# Patient Record
Sex: MALE
[De-identification: names, ages, dates, MRNs, and addresses within clinical notes are randomized per-mention and may not be internally consistent; named-entity substitution may affect disease eponyms.]

---

## 2022-05-23 ENCOUNTER — APPOINTMENT (OUTPATIENT)
Dept: CARDIOLOGY | Facility: CLINIC | Age: 87
End: 2022-05-23
Payer: MEDICARE

## 2022-05-23 VITALS
HEART RATE: 76 BPM | DIASTOLIC BLOOD PRESSURE: 73 MMHG | WEIGHT: 121.5 LBS | HEIGHT: 65.75 IN | RESPIRATION RATE: 18 BRPM | SYSTOLIC BLOOD PRESSURE: 125 MMHG | BODY MASS INDEX: 19.76 KG/M2 | OXYGEN SATURATION: 95 % | TEMPERATURE: 98.2 F

## 2022-05-23 DIAGNOSIS — R00.2 PALPITATIONS: ICD-10-CM

## 2022-05-23 DIAGNOSIS — R94.31 ABNORMAL ELECTROCARDIOGRAM [ECG] [EKG]: ICD-10-CM

## 2022-05-23 PROBLEM — Z00.00 ENCOUNTER FOR PREVENTIVE HEALTH EXAMINATION: Status: ACTIVE | Noted: 2022-05-23

## 2022-05-23 PROCEDURE — 93306 TTE W/DOPPLER COMPLETE: CPT

## 2022-05-23 PROCEDURE — 99204 OFFICE O/P NEW MOD 45 MIN: CPT

## 2022-05-23 RX ORDER — POLYETHYLENE GLYCOL, PROPYLENE GLYCOL .4; .3 G/100ML; G/100ML
0.4-0.3 LIQUID OPHTHALMIC
Refills: 0 | Status: ACTIVE | COMMUNITY

## 2022-05-23 RX ORDER — LIDOCAINE 5% 700 MG/1
5 PATCH TOPICAL
Refills: 0 | Status: ACTIVE | COMMUNITY

## 2022-05-23 RX ORDER — CYCLOSPORINE 0.5 MG/ML
0.05 EMULSION OPHTHALMIC
Refills: 0 | Status: ACTIVE | COMMUNITY

## 2022-05-23 RX ORDER — PANCRELIPASE 120000; 24000; 76000 [USP'U]/1; [USP'U]/1; [USP'U]/1
24000-76000 CAPSULE, DELAYED RELEASE PELLETS ORAL
Refills: 0 | Status: ACTIVE | COMMUNITY

## 2022-05-23 RX ORDER — TOCOPHERSOLAN (VITAMIN E TPGS) 400/15ML
LIQUID (ML) ORAL
Refills: 0 | Status: ACTIVE | COMMUNITY

## 2022-05-23 RX ORDER — ICOSAPENT ETHYL 1000 MG/1
1 CAPSULE ORAL
Refills: 0 | Status: ACTIVE | COMMUNITY

## 2022-05-23 RX ORDER — UBIDECARENONE/VIT E ACET 100MG-5
1000 CAPSULE ORAL
Refills: 0 | Status: ACTIVE | COMMUNITY

## 2022-05-23 RX ORDER — SERTRALINE 25 MG/1
25 TABLET, FILM COATED ORAL
Refills: 0 | Status: ACTIVE | COMMUNITY

## 2022-05-23 NOTE — REASON FOR VISIT
[Arrhythmia/ECG Abnorrmalities] : arrhythmia/ECG abnormalities [FreeTextEntry1] : 86 M with CP and palpitations.

## 2022-05-23 NOTE — HISTORY OF PRESENT ILLNESS
[FreeTextEntry1] : He has a history of gastric and colon CA.\par \par The patient was found to have an abnormal EKG and referred for further evaluation.\par \par He has noted intermittent CP and palpitations over the last month.\par \par The patient reports that his symptoms occur several times/ week and are relieved with rest.\par \par No cough or fevers noted.\par \par His ET is stable.

## 2022-05-27 ENCOUNTER — NON-APPOINTMENT (OUTPATIENT)
Age: 87
End: 2022-05-27

## 2022-06-23 ENCOUNTER — APPOINTMENT (OUTPATIENT)
Dept: CARDIOLOGY | Facility: CLINIC | Age: 87
End: 2022-06-23
Payer: MEDICARE

## 2022-06-23 VITALS
DIASTOLIC BLOOD PRESSURE: 73 MMHG | OXYGEN SATURATION: 93 % | HEART RATE: 65 BPM | RESPIRATION RATE: 18 BRPM | SYSTOLIC BLOOD PRESSURE: 134 MMHG | WEIGHT: 116 LBS | BODY MASS INDEX: 18.87 KG/M2 | TEMPERATURE: 97.8 F

## 2022-06-23 DIAGNOSIS — I49.3 VENTRICULAR PREMATURE DEPOLARIZATION: ICD-10-CM

## 2022-06-23 DIAGNOSIS — I49.1 ATRIAL PREMATURE DEPOLARIZATION: ICD-10-CM

## 2022-06-23 PROCEDURE — 99214 OFFICE O/P EST MOD 30 MIN: CPT

## 2022-06-23 RX ORDER — METOPROLOL SUCCINATE 25 MG/1
25 TABLET, EXTENDED RELEASE ORAL DAILY
Qty: 30 | Refills: 5 | Status: ACTIVE | COMMUNITY
Start: 2022-06-23 | End: 1900-01-01

## 2022-06-23 NOTE — PHYSICAL EXAM

## 2022-06-23 NOTE — REASON FOR VISIT
[Arrhythmia/ECG Abnorrmalities] : arrhythmia/ECG abnormalities [FreeTextEntry1] : 86 M with CP and palpitations. \par

## 2022-06-23 NOTE — HISTORY OF PRESENT ILLNESS
[FreeTextEntry1] :  \par He has a history of gastric and colon CA.\par \par The patient was found to have an abnormal EKG and referred for further evaluation.\par \par He has noted intermittent CP and palpitations over the last month.\par \par The patient reports that his symptoms occur several times/ week and are relieved with rest.\par \par No cough or fevers noted.\par \par His ET is stable. \par  \par Due to worsening CP and palpitations, he had an echo and holter done.\par Echo showed mild AI. Holter showed frequent PACs and PVCs.

## 2022-06-23 NOTE — DISCUSSION/SUMMARY
[FreeTextEntry1] : 86 M AI, PACs PVCs for f/u.\par Echo reviewed. Monitor AI.\par START TOPROL XL 25 QD FOR PACS/ PVCS.

## 2022-12-09 ENCOUNTER — INPATIENT (INPATIENT)
Facility: HOSPITAL | Age: 87
LOS: 10 days | Discharge: HOME HEALTH | End: 2022-12-20
Attending: STUDENT IN AN ORGANIZED HEALTH CARE EDUCATION/TRAINING PROGRAM | Admitting: STUDENT IN AN ORGANIZED HEALTH CARE EDUCATION/TRAINING PROGRAM
Payer: MEDICARE

## 2022-12-09 VITALS
SYSTOLIC BLOOD PRESSURE: 121 MMHG | DIASTOLIC BLOOD PRESSURE: 59 MMHG | RESPIRATION RATE: 16 BRPM | OXYGEN SATURATION: 100 % | HEART RATE: 57 BPM | TEMPERATURE: 98 F

## 2022-12-09 LAB
ALBUMIN SERPL ELPH-MCNC: 3.5 G/DL — SIGNIFICANT CHANGE UP (ref 3.3–5)
ALP SERPL-CCNC: 65 U/L — SIGNIFICANT CHANGE UP (ref 40–120)
ALT FLD-CCNC: 15 U/L — SIGNIFICANT CHANGE UP (ref 4–41)
ANION GAP SERPL CALC-SCNC: 9 MMOL/L — SIGNIFICANT CHANGE UP (ref 7–14)
APPEARANCE UR: CLEAR — SIGNIFICANT CHANGE UP
APTT BLD: 40.1 SEC — HIGH (ref 27–36.3)
AST SERPL-CCNC: 32 U/L — SIGNIFICANT CHANGE UP (ref 4–40)
BASOPHILS # BLD AUTO: 0.03 K/UL — SIGNIFICANT CHANGE UP (ref 0–0.2)
BASOPHILS NFR BLD AUTO: 0.6 % — SIGNIFICANT CHANGE UP (ref 0–2)
BILIRUB SERPL-MCNC: 0.6 MG/DL — SIGNIFICANT CHANGE UP (ref 0.2–1.2)
BILIRUB UR-MCNC: NEGATIVE — SIGNIFICANT CHANGE UP
BUN SERPL-MCNC: 10 MG/DL — SIGNIFICANT CHANGE UP (ref 7–23)
CALCIUM SERPL-MCNC: 8.5 MG/DL — SIGNIFICANT CHANGE UP (ref 8.4–10.5)
CHLORIDE SERPL-SCNC: 103 MMOL/L — SIGNIFICANT CHANGE UP (ref 98–107)
CO2 SERPL-SCNC: 23 MMOL/L — SIGNIFICANT CHANGE UP (ref 22–31)
COLOR SPEC: COLORLESS — SIGNIFICANT CHANGE UP
CREAT SERPL-MCNC: 0.97 MG/DL — SIGNIFICANT CHANGE UP (ref 0.5–1.3)
DIFF PNL FLD: NEGATIVE — SIGNIFICANT CHANGE UP
EGFR: 76 ML/MIN/1.73M2 — SIGNIFICANT CHANGE UP
EOSINOPHIL # BLD AUTO: 0.13 K/UL — SIGNIFICANT CHANGE UP (ref 0–0.5)
EOSINOPHIL NFR BLD AUTO: 2.5 % — SIGNIFICANT CHANGE UP (ref 0–6)
GLUCOSE SERPL-MCNC: 191 MG/DL — HIGH (ref 70–99)
GLUCOSE UR QL: NEGATIVE — SIGNIFICANT CHANGE UP
HCT VFR BLD CALC: 39.7 % — SIGNIFICANT CHANGE UP (ref 39–50)
HGB BLD-MCNC: 13 G/DL — SIGNIFICANT CHANGE UP (ref 13–17)
IANC: 2.41 K/UL — SIGNIFICANT CHANGE UP (ref 1.8–7.4)
IMM GRANULOCYTES NFR BLD AUTO: 0.2 % — SIGNIFICANT CHANGE UP (ref 0–0.9)
INR BLD: 1.02 RATIO — SIGNIFICANT CHANGE UP (ref 0.88–1.16)
KETONES UR-MCNC: NEGATIVE — SIGNIFICANT CHANGE UP
LEUKOCYTE ESTERASE UR-ACNC: NEGATIVE — SIGNIFICANT CHANGE UP
LYMPHOCYTES # BLD AUTO: 2.16 K/UL — SIGNIFICANT CHANGE UP (ref 1–3.3)
LYMPHOCYTES # BLD AUTO: 41.3 % — SIGNIFICANT CHANGE UP (ref 13–44)
MCHC RBC-ENTMCNC: 32.7 GM/DL — SIGNIFICANT CHANGE UP (ref 32–36)
MCHC RBC-ENTMCNC: 32.7 PG — SIGNIFICANT CHANGE UP (ref 27–34)
MCV RBC AUTO: 100 FL — SIGNIFICANT CHANGE UP (ref 80–100)
MONOCYTES # BLD AUTO: 0.49 K/UL — SIGNIFICANT CHANGE UP (ref 0–0.9)
MONOCYTES NFR BLD AUTO: 9.4 % — SIGNIFICANT CHANGE UP (ref 2–14)
NEUTROPHILS # BLD AUTO: 2.41 K/UL — SIGNIFICANT CHANGE UP (ref 1.8–7.4)
NEUTROPHILS NFR BLD AUTO: 46 % — SIGNIFICANT CHANGE UP (ref 43–77)
NITRITE UR-MCNC: NEGATIVE — SIGNIFICANT CHANGE UP
NRBC # BLD: 0 /100 WBCS — SIGNIFICANT CHANGE UP (ref 0–0)
NRBC # FLD: 0 K/UL — SIGNIFICANT CHANGE UP (ref 0–0)
PH UR: 7 — SIGNIFICANT CHANGE UP (ref 5–8)
PLATELET # BLD AUTO: 181 K/UL — SIGNIFICANT CHANGE UP (ref 150–400)
POTASSIUM SERPL-MCNC: 4 MMOL/L — SIGNIFICANT CHANGE UP (ref 3.5–5.3)
POTASSIUM SERPL-SCNC: 4 MMOL/L — SIGNIFICANT CHANGE UP (ref 3.5–5.3)
PROT SERPL-MCNC: 6.4 G/DL — SIGNIFICANT CHANGE UP (ref 6–8.3)
PROT UR-MCNC: NEGATIVE — SIGNIFICANT CHANGE UP
PROTHROM AB SERPL-ACNC: 11.8 SEC — SIGNIFICANT CHANGE UP (ref 10.5–13.4)
RBC # BLD: 3.97 M/UL — LOW (ref 4.2–5.8)
RBC # FLD: 14.1 % — SIGNIFICANT CHANGE UP (ref 10.3–14.5)
SARS-COV-2 RNA SPEC QL NAA+PROBE: SIGNIFICANT CHANGE UP
SODIUM SERPL-SCNC: 135 MMOL/L — SIGNIFICANT CHANGE UP (ref 135–145)
SP GR SPEC: 1.04 — SIGNIFICANT CHANGE UP (ref 1.01–1.05)
TROPONIN T, HIGH SENSITIVITY RESULT: 10 NG/L — SIGNIFICANT CHANGE UP
UROBILINOGEN FLD QL: SIGNIFICANT CHANGE UP
WBC # BLD: 5.23 K/UL — SIGNIFICANT CHANGE UP (ref 3.8–10.5)
WBC # FLD AUTO: 5.23 K/UL — SIGNIFICANT CHANGE UP (ref 3.8–10.5)

## 2022-12-09 PROCEDURE — 70496 CT ANGIOGRAPHY HEAD: CPT | Mod: 26,MA

## 2022-12-09 PROCEDURE — 72131 CT LUMBAR SPINE W/O DYE: CPT | Mod: 26,ME

## 2022-12-09 PROCEDURE — 72128 CT CHEST SPINE W/O DYE: CPT | Mod: 26,ME

## 2022-12-09 PROCEDURE — 0042T: CPT | Mod: MA

## 2022-12-09 PROCEDURE — 99291 CRITICAL CARE FIRST HOUR: CPT

## 2022-12-09 PROCEDURE — G1004: CPT

## 2022-12-09 PROCEDURE — 71045 X-RAY EXAM CHEST 1 VIEW: CPT | Mod: 26

## 2022-12-09 PROCEDURE — 70498 CT ANGIOGRAPHY NECK: CPT | Mod: 26,MA

## 2022-12-09 PROCEDURE — 72125 CT NECK SPINE W/O DYE: CPT | Mod: 26,ME

## 2022-12-09 RX ORDER — HALOPERIDOL DECANOATE 100 MG/ML
2 INJECTION INTRAMUSCULAR ONCE
Refills: 0 | Status: COMPLETED | OUTPATIENT
Start: 2022-12-09 | End: 2022-12-09

## 2022-12-09 RX ADMIN — HALOPERIDOL DECANOATE 2 MILLIGRAM(S): 100 INJECTION INTRAMUSCULAR at 22:28

## 2022-12-09 NOTE — CONSULT NOTE ADULT - ATTENDING COMMENTS
His daughter says that the left sided weakness is improving.   Exam: Left side: Delt 4; Tri 4; HF 4-; KF 4; only trace movement in the left ankle and toes.   Increased tone in all 4 ext.  Reflexes 2 throughout except 3 in the knees;  Babinski sign, B.  +pec major, B. No Mateo's sign, B.     A/P  Mr. Dudley is an 86 yo man with stroke clinically.   Telemetry.   Possible myelopathy on exam - I agree with MRI C spine (to exclude cervical spondylotic myelopathy) in addition to MRI brain.   I agree with work up and management as above.   Thank you. His daughter says that the left sided weakness is improving.   Exam: Left side: Delt 4; Tri 4; HF 4-; KF 4; only trace movement in the left ankle and toes.   Increased tone in all 4 ext.  Reflexes 2 throughout except 3 in the knees;  Babinski sign, B.  +pec major, B. No Mateo's sign, B.     A/P  Mr. Dudley is an 88 yo man with stroke clinically.   Telemetry.   Possible myelopathy on exam - I agree with MRI C spine (to exclude cervical spondylotic myelopathy) in addition to MRI brain.   I agree with work up and management as above.   Thank you.

## 2022-12-09 NOTE — ED ADULT NURSE NOTE - OBJECTIVE STATEMENT
UNRULY RN: pt. received to CT area as a code stroke A&Ox4 ambulatory at baseline LKW 0700 today. As per family, pt. had two unwitnessed falls today, after the first fall has L sided weakeness since then. L sided weakness noted upon interview. NAD noted at this time. respirations even and unlabored on RA. 20g IV placed in the R AC. Labs drawn and sent. comfort measures provided. safety precautions maintained.

## 2022-12-09 NOTE — ED PROVIDER NOTE - OBJECTIVE STATEMENT
pt is an 86 yo M with a h/o meningioma, a fib not on AC, htn, prior stoke who presents with frequent falls today. 2 weeks ago pt had a fall and was seen at an OSH, was diagnosed with meningioma. Has not had outpt MRI f/u. Has had frequent falls for past year but today had more than 3 falls. Most recent fall his legs twisted under him and he couldn't get up without help. He also has ?dementia and has been acting more confused lately.  Last known normal per daughter 7am today. pt is an 88 yo M with a h/o meningioma, a fib not on AC, htn, prior stoke who presents with frequent falls today. 2 weeks ago pt had a fall and was seen at an OSH, was diagnosed with meningioma. Has not had outpt MRI f/u. Has had frequent falls for past year but today had more than 3 falls. Most recent fall his legs twisted under him and he couldn't get up without help. He also has ?dementia and has been acting more confused lately.  Last known normal per daughter 7am today.

## 2022-12-09 NOTE — ED PROVIDER NOTE - ATTENDING CONTRIBUTION TO CARE
87 M PMHx irregular heart rhythm, BPH, possible dementia, stomach cancer, meningioma recent ED visit [facility] for fall, who presents to ED for 2x falls then found with L sided weakness. Falls unwitnessed, had difficult getting patient up but felt L sided was weak. Pt was seen at [facility] 2 weeks ago after fall with head trauma- family presents CT from [place] which showed small dural structure L frontal region possibly meningioma versus focal subdural hematoma that cannot be excluded. Also had focal cerebellar infarcts and b/l lacunar infarcts in/adjacent to BG of indeterminate age per the report family provided. Prior to fall today patient was able to ambulate without assistance.  No facial droop noted, weakness to left upper and left lower extremity, sensation intact.  No slurred speech.  Code stroke was activated from an bay.  CTs without any concerning findings at this time, will admit for MRI and  PT eval.    Upon my evaluation, this patient had a high probability of imminent or life-threatening deterioration due to concern for stroke which required my direct attention, intervention, and personal management.  The patient has a  medical condition that impairs one or more vital organ systems.  Frequent personal assessment and adjustment of medical interventions was performed.       I have personally provided 30 minutes of critical care time exclusive of time spent on separately billable procedures. Time includes review of laboratory data, radiology results, discussion with consultants, patient and family; monitoring for potential decompensation, as well as time spent retrieving data and reviewing the chart and documenting the visit. Interventions were performed as documented above. 87 M PMHx irregular heart rhythm, BPH, possible dementia, stomach cancer, meningioma recent ED visit [facility] for fall, who presents to ED for 2x falls then found with L sided weakness. Falls unwitnessed, had difficult getting patient up but felt L sided was weak. Pt was seen at [facility] 2 weeks ago after fall with head trauma- family presents CT from [place] which showed small dural structure L frontal region possibly meningioma versus focal subdural hematoma that cannot be excluded. Also had focal cerebellar infarcts and b/l lacunar infarcts in/adjacent to BG of indeterminate age per the report family provided. Prior to fall today patient was able to ambulate without assistance.  No facial droop noted, weakness to left upper and left lower extremity, sensation intact.  No slurred speech.  Code stroke was activated from an bay.  CTs without any concerning findings at this time, will admit for MRI and  PT eval.    Upon my evaluation, this patient had a high probability of imminent or life-threatening deterioration due to concern for stroke which required my direct attention, intervention, and personal management.  The patient has a  medical condition that impairs one or more vital organ systems.  Frequent personal assessment and adjustment of medical interventions was performed.       I have personally provided 30 minutes of critical care time exclusive of time spent on separately billable procedures. Time includes review of laboratory data, radiology results, discussion with consultants, patient and family; monitoring for potential decompensation, as well as time spent retrieving data and reviewing the chart and documenting the visit. Interventions were performed as documented above. 87 M PMHx irregular heart rhythm, BPH, possible dementia, stomach cancer, meningioma recent ED visit [facility] for fall, who presents to ED for 2x falls then found with L sided weakness. Falls unwitnessed, had difficult getting patient up but felt L sided was weak. Pt was seen at [facility] 2 weeks ago after fall with head trauma- family presents CT from [place] which showed small dural structure L frontal region possibly meningioma versus focal subdural hematoma that cannot be excluded. Also had focal cerebellar infarcts and b/l lacunar infarcts in/adjacent to BG of indeterminate age per the report family provided. Prior to fall today patient was able to ambulate without assistance.  No facial droop noted, weakness to left upper and left lower extremity, sensation intact.  No slurred speech.  Code stroke was activated from an bay.  CTs without any concerning findings at this time, will admit for MRI and  PT eval.    Upon my evaluation, this patient had a high probability of imminent or life-threatening deterioration due to concern for stroke which required my direct attention, intervention, and personal management.  The patient has a  medical condition that impairs one or more vital organ systems.  Frequent personal assessment and adjustment of medical interventions was performed.       I have personally provided 30 minutes of critical care time exclusive of time spent on separately billable procedures. Time includes review of laboratory data, radiology results, discussion with consultants, patient and family; monitoring for potential decompensation, as well as time spent retrieving data and reviewing the chart and documenting the visit. Interventions were performed as documented above.

## 2022-12-09 NOTE — ED PROVIDER NOTE - PROGRESS NOTE DETAILS
neuro requesting CT spine given hyper reflexia of L LE and UE  pt tba for MRI brain CT spine shows subacute to chronic T12 fx. pt tba

## 2022-12-09 NOTE — CONSULT NOTE ADULT - ASSESSMENT
Patient CP is a 88yo R-handed M PMHx irregular heart rhythm, BPH, possible dementia, stomach cancer, meningioma recent ED visit [facility] for fall, who presents to ED for 2x falls then found with L sided weakness. Daughter Freedom at bedside states LKW 7AM 12/9 when she last saw him. He remained with other family members during the day and was found after a fall after which he had L sided weakness. He had another fall and they noted that he had "twisted" his legs. Not on ac/ap agents. Of note, presented to [place] ED 11/26/22 for fall, and CT c spine neg for fractures. CT head showed small dural structure L frontal region possibly meningioma versus focal subdural hematoma that cannot be excluded. Also had focal cerebellar infarcts and b/l lacunar infarcts in/adjacent to BG of indeterminate age per the report family provided.     LKW: 7AM 12/9  NIHSS: 5   Baseline MRS: 2 (able to ambulate prior to event without assistance/ devices but due to dementia has difficulty with ADLs, sometimes gets lost)  Not a tPA candidate due to outside window  Not a thrombectomy candidate due to no large vessel occlusion     CT head w/o con: Mild volume loss, microvascular disease,no acute hemorrhage or midline shift. Left frontal probable meningioma 1.6 cm x 0.9 cm  CTA head/neck w/ con: possible slightly decreased ossification of smaller vessels of R MCA distal branches. No letty hemodynamically significant stenosis otherwise.   CT perfusion: no core/penumbra    Impression: L hemiparesis concerning for R brain dysfunction, likely ischemic stroke of unknown mechanism. Also found with hyperreflexia, upgoing toes in LLE, suspect potential cervical spine pathology as well. Family dont believe patient        Recommendations:  [ ] Aspirin 81mg daily for now pending further workup for secondary stroke prevention   [ ] Atorvastatin 40-80 mg daily titrated per LDL < 70  [ ] HgbA1C, fasting lipid panel, CBC, CMP, coag panel, troponin  [ ] MRI brain w/o con, MR cervical spine w/o con  [ ] Recommend cervical c spine CT scan w/o con given recent fall and exam with hyperreflexia for age, positive babinski.   [ ] TTE w/ bubble study if found with stroke  [ ] telemetry to check for arrhythmia, EKG, already has extended cardiac monitoring. If possible, can interrogate.     - Tight glucose control (long-term goal HgbA1c < 6%)  - Stroke education and counseling  - Neuro-checks and VS q4h  - Permissive HTN up to 220/120 for 24-48h from symptom onset  - Dysphagia screen. If fails, speech/swallow eval  - aspiration, fall precautions  - STAT CT head non-contrast for change in neuro exam.   - PT/ OT / DVT ppx per primary team     Discussed with telestroke attending Dr Jaclyn Zeng regarding decision against candidacy for tPA/ thrombectomy. Will be formally staffed on morning rounds with attending. Recommendations will be complete once signed by attending. Patient CP is a 86yo R-handed M PMHx irregular heart rhythm, BPH, possible dementia, stomach cancer, meningioma recent ED visit [facility] for fall, who presents to ED for 2x falls then found with L sided weakness. Daughter Freedom at bedside states LKW 7AM 12/9 when she last saw him. He remained with other family members during the day and was found after a fall after which he had L sided weakness. He had another fall and they noted that he had "twisted" his legs. Not on ac/ap agents. Of note, presented to [place] ED 11/26/22 for fall, and CT c spine neg for fractures. CT head showed small dural structure L frontal region possibly meningioma versus focal subdural hematoma that cannot be excluded. Also had focal cerebellar infarcts and b/l lacunar infarcts in/adjacent to BG of indeterminate age per the report family provided.     LKW: 7AM 12/9  NIHSS: 5   Baseline MRS: 2 (able to ambulate prior to event without assistance/ devices but due to dementia has difficulty with ADLs, sometimes gets lost)  Not a tPA candidate due to outside window  Not a thrombectomy candidate due to no large vessel occlusion     CT head w/o con: Mild volume loss, microvascular disease,no acute hemorrhage or midline shift. Left frontal probable meningioma 1.6 cm x 0.9 cm  CTA head/neck w/ con: possible slightly decreased ossification of smaller vessels of R MCA distal branches. No letty hemodynamically significant stenosis otherwise.   CT perfusion: no core/penumbra    Impression: L hemiparesis concerning for R brain dysfunction, likely ischemic stroke of unknown mechanism. Also found with hyperreflexia, upgoing toes in LLE, suspect potential cervical spine pathology as well. Family dont believe patient        Recommendations:  [ ] Aspirin 81mg daily for now pending further workup for secondary stroke prevention   [ ] Atorvastatin 40-80 mg daily titrated per LDL < 70  [ ] HgbA1C, fasting lipid panel, CBC, CMP, coag panel, troponin  [ ] MRI brain w/o con, MR cervical spine w/o con  [ ] Recommend cervical c spine CT scan w/o con given recent fall and exam with hyperreflexia for age, positive babinski.   [ ] TTE w/ bubble study if found with stroke  [ ] telemetry to check for arrhythmia, EKG, already has extended cardiac monitoring. If possible, can interrogate.     - Tight glucose control (long-term goal HgbA1c < 6%)  - Stroke education and counseling  - Neuro-checks and VS q4h  - Permissive HTN up to 220/120 for 24-48h from symptom onset  - Dysphagia screen. If fails, speech/swallow eval  - aspiration, fall precautions  - STAT CT head non-contrast for change in neuro exam.   - PT/ OT / DVT ppx per primary team     Discussed with telestroke attending Dr Jaclyn Zeng regarding decision against candidacy for tPA/ thrombectomy. Will be formally staffed on morning rounds with attending. Recommendations will be complete once signed by attending. Patient CP is a 88yo R-handed M PMHx irregular heart rhythm, BPH, possible dementia, stomach cancer, meningioma recent ED visit [facility] for fall, who presents to ED for 2x falls then found with L sided weakness. Daughter Freedom at bedside states LKW 7AM 12/9 when she last saw him. He remained with other family members during the day and was found after a fall after which he had L sided weakness. He had another fall and they noted that he had "twisted" his legs. Not on ac/ap agents. Of note, presented to [place] ED 11/26/22 for fall, and CT c spine neg for fractures. CT head showed small dural structure L frontal region possibly meningioma versus focal subdural hematoma that cannot be excluded. Also had focal cerebellar infarcts and b/l lacunar infarcts in/adjacent to BG of indeterminate age per the report family provided.     LKW: 7AM 12/9  NIHSS: 5   Baseline MRS: 2 (able to ambulate prior to event without assistance/ devices but due to dementia has difficulty with ADLs, sometimes gets lost)  Not a tPA candidate due to outside window  Not a thrombectomy candidate due to no large vessel occlusion     CT head w/o con: Mild volume loss, microvascular disease,no acute hemorrhage or midline shift. Left frontal probable meningioma 1.6 cm x 0.9 cm  CTA head/neck w/ con: possible slightly decreased ossification of smaller vessels of R MCA distal branches. No letty hemodynamically significant stenosis otherwise.   CT perfusion: no core/penumbra    Impression: L hemiparesis concerning for R brain dysfunction, likely ischemic stroke of unknown mechanism. Also found with hyperreflexia, upgoing toes in LLE, suspect potential cervical spine pathology as well. Family dont believe patient        Recommendations:  [ ] Aspirin 81mg daily for now pending further workup for secondary stroke prevention   [ ] Atorvastatin 40-80 mg daily titrated per LDL < 70  [ ] HgbA1C, fasting lipid panel, CBC, CMP, coag panel, troponin  [ ] MRI brain w/o con, MR cervical spine w/o con  [ ] Recommend cervical c spine CT scan w/o con given recent fall and exam with hyperreflexia for age, positive babinski.   [ ] TTE w/ bubble study if found with stroke  [ ] telemetry to check for arrhythmia, EKG, already has extended cardiac monitoring. If possible, can interrogate.     - Tight glucose control (long-term goal HgbA1c < 6%)  - Stroke education and counseling  - Neuro-checks and VS q4h  - Permissive HTN up to 220/120 for 24-48h from symptom onset  - Dysphagia screen. If fails, speech/swallow eval  - aspiration, fall precautions  - STAT CT head non-contrast for change in neuro exam.   - PT/ OT / DVT ppx per primary team     Discussed with telestroke attending Dr Jaclyn Zeng regarding decision against candidacy for tPA/ thrombectomy. Will be formally staffed on morning rounds with attending. Recommendations will be complete once signed by attending. Patient CP is a 86yo R-handed M PMHx irregular heart rhythm, BPH, possible dementia, stomach cancer, meningioma recent ED visit [facility] for fall, who presents to ED for 2x falls then found with L sided weakness. Daughter Freedom at bedside states LKW 7AM 12/9 when she last saw him. He remained with other family members during the day and was found after a fall after which he had L sided weakness. He had another fall and they noted that he had "twisted" his legs. Not on ac/ap agents. Of note, presented to [place] ED 11/26/22 for fall, and CT c spine neg for fractures. CT head showed small dural structure L frontal region possibly meningioma versus focal subdural hematoma that cannot be excluded. Also had focal cerebellar infarcts and b/l lacunar infarcts in/adjacent to BG of indeterminate age per the report family provided.     LKW: 7AM 12/9  NIHSS: 5   Baseline MRS: 2 (able to ambulate prior to event without assistance/ devices but due to dementia has difficulty with ADLs, sometimes gets lost)  Not a tPA candidate due to outside window  Not a thrombectomy candidate due to no large vessel occlusion     CT head w/o con: Mild volume loss, microvascular disease,no acute hemorrhage or midline shift. Left frontal probable meningioma 1.6 cm x 0.9 cm  CTA head/neck w/ con: possible slightly decreased ossification of smaller vessels of R MCA distal branches. No letty hemodynamically significant stenosis otherwise.   CT perfusion: no core/penumbra    Impression: L hemiparesis concerning for R brain dysfunction, likely ischemic stroke of unknown mechanism. Also found with hyperreflexia, upgoing toes in LLE, suspect potential cervical spine pathology as well.     Recommendations:  [ ] Aspirin 81mg daily for now pending further workup for secondary stroke prevention   [ ] Atorvastatin 40-80 mg daily titrated per LDL < 70  [ ] HgbA1C, fasting lipid panel, CBC, CMP, coag panel, troponin  [ ] MRI brain w/o con, MR cervical spine w/o con  [ ] Recommend cervical c spine CT scan w/o con given recent fall and exam with hyperreflexia for age, positive babinski.   [ ] TTE w/ bubble study if found with stroke  [ ] telemetry to check for arrhythmia, EKG, already has extended cardiac monitoring. If possible, can interrogate.     - Tight glucose control (long-term goal HgbA1c < 6%)  - Stroke education and counseling  - Neuro-checks and VS q4h  - Permissive HTN up to 220/120 for 24-48h from symptom onset  - Dysphagia screen. If fails, speech/swallow eval  - aspiration, fall precautions  - STAT CT head non-contrast for change in neuro exam.   - PT/ OT / DVT ppx per primary team     Discussed with telestroke attending Dr Jaclyn Zeng regarding decision against candidacy for tPA/ thrombectomy. Will be formally staffed on morning rounds with attending. Recommendations will be complete once signed by attending. Patient CP is a 88yo R-handed M PMHx irregular heart rhythm, BPH, possible dementia, stomach cancer, meningioma recent ED visit [facility] for fall, who presents to ED for 2x falls then found with L sided weakness. Daughter Freedom at bedside states LKW 7AM 12/9 when she last saw him. He remained with other family members during the day and was found after a fall after which he had L sided weakness. He had another fall and they noted that he had "twisted" his legs. Not on ac/ap agents. Of note, presented to [place] ED 11/26/22 for fall, and CT c spine neg for fractures. CT head showed small dural structure L frontal region possibly meningioma versus focal subdural hematoma that cannot be excluded. Also had focal cerebellar infarcts and b/l lacunar infarcts in/adjacent to BG of indeterminate age per the report family provided.     LKW: 7AM 12/9  NIHSS: 5   Baseline MRS: 2 (able to ambulate prior to event without assistance/ devices but due to dementia has difficulty with ADLs, sometimes gets lost)  Not a tPA candidate due to outside window  Not a thrombectomy candidate due to no large vessel occlusion     CT head w/o con: Mild volume loss, microvascular disease,no acute hemorrhage or midline shift. Left frontal probable meningioma 1.6 cm x 0.9 cm  CTA head/neck w/ con: possible slightly decreased ossification of smaller vessels of R MCA distal branches. No letty hemodynamically significant stenosis otherwise.   CT perfusion: no core/penumbra    Impression: L hemiparesis concerning for R brain dysfunction, likely ischemic stroke of unknown mechanism. Also found with hyperreflexia, upgoing toes in LLE, suspect potential cervical spine pathology as well.     Recommendations:  [ ] Aspirin 81mg daily for now pending further workup for secondary stroke prevention   [ ] Atorvastatin 40-80 mg daily titrated per LDL < 70  [ ] HgbA1C, fasting lipid panel, CBC, CMP, coag panel, troponin  [ ] MRI brain w/o con, MR cervical spine w/o con  [ ] Recommend cervical c spine CT scan w/o con given recent fall and exam with hyperreflexia for age, positive babinski.   [ ] TTE w/ bubble study if found with stroke  [ ] telemetry to check for arrhythmia, EKG, already has extended cardiac monitoring. If possible, can interrogate.     - Tight glucose control (long-term goal HgbA1c < 6%)  - Stroke education and counseling  - Neuro-checks and VS q4h  - Permissive HTN up to 220/120 for 24-48h from symptom onset  - Dysphagia screen. If fails, speech/swallow eval  - aspiration, fall precautions  - STAT CT head non-contrast for change in neuro exam.   - PT/ OT / DVT ppx per primary team     Discussed with telestroke attending Dr Jaclyn Zneg regarding decision against candidacy for tPA/ thrombectomy. Will be formally staffed on morning rounds with attending. Recommendations will be complete once signed by attending. Patient CP is a 88yo R-handed M PMHx irregular heart rhythm, BPH, possible dementia, stomach cancer, meningioma recent ED visit [facility] for fall, who presents to ED for 2x falls then found with L sided weakness. Daughter Freedom at bedside states LKW 7AM 12/9 when she last saw him. He remained with other family members during the day and was found after a fall after which he had L sided weakness. He had another fall and they noted that he had "twisted" his legs. Not on ac/ap agents. Of note, presented to [place] ED 11/26/22 for fall, and CT c spine neg for fractures. CT head showed small dural structure L frontal region possibly meningioma versus focal subdural hematoma that cannot be excluded. Also had focal cerebellar infarcts and b/l lacunar infarcts in/adjacent to BG of indeterminate age per the report family provided.     LKW: 7AM 12/9  NIHSS: 5   Baseline MRS: 2 (able to ambulate prior to event without assistance/ devices but due to dementia has difficulty with ADLs, sometimes gets lost)  Not a tPA candidate due to outside window  Not a thrombectomy candidate due to no large vessel occlusion     CT head w/o con: Mild volume loss, microvascular disease,no acute hemorrhage or midline shift. Left frontal probable meningioma 1.6 cm x 0.9 cm  CTA head/neck w/ con: possible slightly decreased ossification of smaller vessels of R MCA distal branches. No letty hemodynamically significant stenosis otherwise.   CT perfusion: no core/penumbra    Impression: L hemiparesis concerning for R brain dysfunction, likely ischemic stroke of unknown mechanism. Also found with hyperreflexia, upgoing toes in LLE, suspect potential cervical spine pathology as well.     Recommendations:  [ ] Aspirin 81mg daily for now pending further workup for secondary stroke prevention   [ ] Atorvastatin 40-80 mg daily titrated per LDL < 70  [ ] HgbA1C, fasting lipid panel, CBC, CMP, coag panel, troponin  [ ] MRI brain w/o con, MR cervical spine w/o con  [ ] Recommend cervical c spine CT scan w/o con given recent fall and exam with hyperreflexia for age, positive babinski.   [ ] TTE w/ bubble study if found with stroke  [ ] telemetry to check for arrhythmia, EKG, already has extended cardiac monitoring. If possible, can interrogate.     - Tight glucose control (long-term goal HgbA1c < 6%)  - Stroke education and counseling  - Neuro-checks and VS q4h  - Permissive HTN up to 220/120 for 24-48h from symptom onset  - Dysphagia screen. If fails, speech/swallow eval  - aspiration, fall precautions  - STAT CT head non-contrast for change in neuro exam.   - PT/ OT / DVT ppx per primary team     Discussed with telestroke attending Dr Jaclyn Zeng regarding decision against candidacy for tPA/ thrombectomy. Will be formally staffed on morning rounds with attending. Recommendations will be complete once signed by attending. Patient CP is a 86yo R-handed M PMHx irregular heart rhythm, BPH, possible dementia, stomach cancer, meningioma recent ED visit [facility] for fall, who presents to ED for 2x falls then found with L sided weakness. Daughter Freedom at bedside states LKW 7AM 12/9 when she last saw him. He remained with other family members during the day and was found after a fall after which he had L sided weakness. He had another fall and they noted that he had "twisted" his legs. Not on ac/ap agents. Of note, presented to [place] ED 11/26/22 for fall, and CT c spine neg for fractures. CT head showed small dural structure L frontal region possibly meningioma versus focal subdural hematoma that cannot be excluded. Also had focal cerebellar infarcts and b/l lacunar infarcts in/adjacent to BG of indeterminate age per the report family provided.     LKW: 7AM 12/9  NIHSS: 5   Baseline MRS: 2 (able to ambulate prior to event without assistance/ devices but due to dementia has difficulty with ADLs, sometimes gets lost)  Not a tPA candidate due to outside window  Not a thrombectomy candidate due to no large vessel occlusion     CT head w/o con: Mild volume loss, microvascular disease,no acute hemorrhage or midline shift. Left frontal probable meningioma 1.6 cm x 0.9 cm  CTA head/neck w/ con: possible slightly decreased ossification of smaller vessels of R MCA distal branches. No letty hemodynamically significant stenosis otherwise.   CT perfusion: no core/penumbra    Impression: L hemiparesis concerning for R brain dysfunction, likely ischemic stroke of unknown mechanism. Also found with hyperreflexia, upgoing toes in LLE, suspect potential cervical spine pathology as well. Per family, do not suspect trauma to c/t/l spine but rather head during falls.    Recommendations:  [ ] Aspirin 81mg daily for now pending further workup for secondary stroke prevention   [ ] Atorvastatin 40-80 mg daily titrated per LDL < 70  [ ] HgbA1C, fasting lipid panel, CBC, CMP, coag panel, troponin  [ ] MRI brain w/o con, MR cervical spine w/o con  [ ] Recommend cervical c spine CT scan w/o con given recent fall and exam with hyperreflexia for age, positive babinski. Unsure if these findings are chronic.   [ ] TTE w/ bubble study if found with stroke  [ ] telemetry to check for arrhythmia, EKG, already has extended cardiac monitoring. If possible, can interrogate.     - Tight glucose control (long-term goal HgbA1c < 6%)  - Stroke education and counseling  - Neuro-checks and VS q4h  - Permissive HTN up to 220/120 for 24-48h from symptom onset  - Dysphagia screen. If fails, speech/swallow eval  - aspiration, fall precautions  - STAT CT head non-contrast for change in neuro exam.   - PT/ OT / DVT ppx per primary team     Discussed with telestroke attending Dr Jaclyn Zeng regarding decision against candidacy for tPA/ thrombectomy. Will be formally staffed on morning rounds with attending. Recommendations will be complete once signed by attending. Patient CP is a 86yo R-handed M PMHx irregular heart rhythm, BPH, possible dementia, stomach cancer, meningioma recent ED visit [facility] for fall, who presents to ED for 2x falls then found with L sided weakness. Daughter Freedom at bedside states LKW 7AM 12/9 when she last saw him. He remained with other family members during the day and was found after a fall after which he had L sided weakness. He had another fall and they noted that he had "twisted" his legs. Not on ac/ap agents. Of note, presented to [place] ED 11/26/22 for fall, and CT c spine neg for fractures. CT head showed small dural structure L frontal region possibly meningioma versus focal subdural hematoma that cannot be excluded. Also had focal cerebellar infarcts and b/l lacunar infarcts in/adjacent to BG of indeterminate age per the report family provided.     LKW: 7AM 12/9  NIHSS: 5   Baseline MRS: 2 (able to ambulate prior to event without assistance/ devices but due to dementia has difficulty with ADLs, sometimes gets lost)  Not a tPA candidate due to outside window  Not a thrombectomy candidate due to no large vessel occlusion     CT head w/o con: Mild volume loss, microvascular disease,no acute hemorrhage or midline shift. Left frontal probable meningioma 1.6 cm x 0.9 cm  CTA head/neck w/ con: possible slightly decreased ossification of smaller vessels of R MCA distal branches. No letty hemodynamically significant stenosis otherwise.   CT perfusion: no core/penumbra    Impression: L hemiparesis concerning for R brain dysfunction, likely ischemic stroke of unknown mechanism. Also found with hyperreflexia, upgoing toes in LLE, suspect potential cervical spine pathology as well. Per family, do not suspect trauma to c/t/l spine but rather head during falls.    Recommendations:  [ ] Aspirin 81mg daily for now pending further workup for secondary stroke prevention   [ ] Atorvastatin 40-80 mg daily titrated per LDL < 70  [ ] HgbA1C, fasting lipid panel, CBC, CMP, coag panel, troponin  [ ] MRI brain w/o con, MR cervical spine w/o con  [ ] Recommend cervical c spine CT scan w/o con given recent fall and exam with hyperreflexia for age, positive babinski. Unsure if these findings are chronic.   [ ] TTE w/ bubble study if found with stroke  [ ] telemetry to check for arrhythmia, EKG, already has extended cardiac monitoring. If possible, can interrogate.     - Tight glucose control (long-term goal HgbA1c < 6%)  - Stroke education and counseling  - Neuro-checks and VS q4h  - Permissive HTN up to 220/120 for 24-48h from symptom onset  - Dysphagia screen. If fails, speech/swallow eval  - aspiration, fall precautions  - STAT CT head non-contrast for change in neuro exam.   - PT/ OT / DVT ppx per primary team     Discussed with telestroke attending Dr Jaclyn Zeng regarding decision against candidacy for tPA/ thrombectomy. Will be formally staffed on morning rounds with attending. Recommendations will be complete once signed by attending. Patient CP is a 86yo R-handed M PMHx irregular heart rhythm, BPH, possible dementia, stomach cancer, meningioma recent ED visit [facility] [place] for fall, who presents to ED for 2x falls then found with L sided weakness. Daughter Freedom at bedside states LKW 7AM 12/9 when she last saw him. He remained with other family members during the day and was found after a fall after which he had L sided weakness. He had another fall and they noted that he had "twisted" his legs. Not on ac/ap agents. Of note, presented to [place] ED 11/26/22 for fall, and CT c spine neg for fractures. CT head showed small dural structure L frontal region possibly meningioma versus focal subdural hematoma that cannot be excluded. Also had focal cerebellar infarcts and b/l lacunar infarcts in/adjacent to BG of indeterminate age per the report family provided.     LKW: 7AM 12/9  NIHSS: 5   Baseline MRS: 2 (able to ambulate prior to event without assistance/ devices but due to dementia has difficulty with ADLs, sometimes gets lost)  Not a tPA candidate due to outside window  Not a thrombectomy candidate due to no large vessel occlusion     CT head w/o con: Mild volume loss, microvascular disease,no acute hemorrhage or midline shift. Left frontal probable meningioma 1.6 cm x 0.9 cm  CTA head/neck w/ con: possible slightly decreased ossification of smaller vessels of R MCA distal branches. No letty hemodynamically significant stenosis otherwise.   CT perfusion: no core/penumbra    Impression: L hemiparesis concerning for R brain dysfunction, likely ischemic stroke of unknown mechanism. Also found with hyperreflexia, upgoing toes in LLE, suspect potential cervical spine pathology as well. Per family, do not suspect trauma to c/t/l spine but rather head during falls.    Recommendations:  [ ] Aspirin 81mg daily for now pending further workup for secondary stroke prevention   [ ] Atorvastatin 40-80 mg daily titrated per LDL < 70  [ ] HgbA1C, fasting lipid panel, CBC, CMP, coag panel, troponin  [ ] MRI brain w/o con, MR cervical spine w/o con  [ ] Recommend cervical c spine CT scan w/o con given recent fall and exam with hyperreflexia for age, positive babinski. Unsure if these findings are chronic.   [ ] TTE w/ bubble study if found with stroke  [ ] telemetry to check for arrhythmia, EKG, already has extended cardiac monitoring. If possible, can interrogate.     - Tight glucose control (long-term goal HgbA1c < 6%)  - Stroke education and counseling  - Neuro-checks and VS q4h  - Permissive HTN up to 220/120 for 24-48h from symptom onset  - Dysphagia screen. If fails, speech/swallow eval  - aspiration, fall precautions  - STAT CT head non-contrast for change in neuro exam.   - PT/ OT / DVT ppx per primary team     Discussed with telestroke attending Dr Jaclyn Zeng regarding decision against candidacy for tPA/ thrombectomy. Will be formally staffed on morning rounds with attending. Recommendations will be complete once signed by attending.

## 2022-12-09 NOTE — ED ADULT TRIAGE NOTE - CHIEF COMPLAINT QUOTE
brought in by daughter stating patient unable to walk or use left upper/lower extremities since AM, time unknown, strength not equal, weakness noted to left side of body, code stroke initiated.

## 2022-12-09 NOTE — ED PROVIDER NOTE - CLINICAL SUMMARY MEDICAL DECISION MAKING FREE TEXT BOX
Long - pt is an 88 yo M with a h/o recent brain bleed who presents with frequent falls today. code stroke called. neuro at bedside at CT. concern for worsening bleed vs stroke vs lyte abnormality vs infection. will check CT head, labs, urine Long - pt is an 86 yo M with a h/o recent brain bleed who presents with frequent falls today. code stroke called. neuro at bedside at CT. concern for worsening bleed vs stroke vs lyte abnormality vs infection. will check CT head, labs, urine Long - pt is an 88 yo M with a h/o meningioma, a fib not on AC, htn, prior stoke who presents with frequent falls today. code stroke called. neuro at bedside at CT. concern for worsening bleed vs stroke vs lyte abnormality vs infection. will check CT head, labs, urine Long - pt is an 86 yo M with a h/o meningioma, a fib not on AC, htn, prior stoke who presents with frequent falls today. code stroke called. neuro at bedside at CT. concern for worsening bleed vs stroke vs lyte abnormality vs infection. will check CT head, labs, urine

## 2022-12-09 NOTE — STROKE CODE NOTE - [facility] STROKE SCALE DATE
[FreeTextEntry1] : HCM\par Labs today\par Advised gyn, mammogram, bone density\par Advised Derm\par Colonoscopy utd 2020\par Consider shingrix\par continue current meds\par 
09-Dec-2022 19:25

## 2022-12-09 NOTE — CONSULT NOTE ADULT - SUBJECTIVE AND OBJECTIVE BOX
Neurology Consultation     HPI: Patient CP is a 88yo R-handed M PMHx irregular heart rhythm, BPH, possible dementia, stomach cancer, meningioma recent ED visit [facility] who presents to ED for fall then L sided weakness. Daughter Freedom at bedside states LKW 7AM 12/9 when she last saw him. He remained with other family members during the day and was found after a fall after which he had L sided weakness. He had another fall and they noted today that he had "twisted" his legs. Not on ac/ap agents. Of note, preesnted to [place] ED 11/26/22 for fall, and CT c spine neg for fractures. CT head showed small dural structure L frontal region possibly meningioma versus focal subdural hematoma that cannot be excluded. Also had focal cerebellar infarcts and b/l lacunar infarcts in/adjacent to BG of indeterminate age per the report family provided.       NIHSS:   preMRS:     PAST MEDICAL & SURGICAL HISTORY:    FAMILY HISTORY:    SOCIAL HISTORY: former drinker, little amount    MEDICATIONS (HOME):  Home Medications:    MEDICATIONS  (STANDING):    MEDICATIONS  (PRN):    ALLERGIES/INTOLERANCES:  Allergies  No Known Allergies    Intolerances    VITALS & EXAMINATION:  Vital Signs Last 24 Hrs  T(C): 36.7 (09 Dec 2022 19:16), Max: 36.7 (09 Dec 2022 19:16)  T(F): 98.1 (09 Dec 2022 19:16), Max: 98.1 (09 Dec 2022 19:16)  HR: 57 (09 Dec 2022 19:16) (57 - 57)  BP: 121/59 (09 Dec 2022 19:16) (121/59 - 121/59)  BP(mean): --  RR: 16 (09 Dec 2022 19:16) (16 - 16)  SpO2: 100% (09 Dec 2022 19:16) (100% - 100%)    Parameters below as of 09 Dec 2022 19:16  Patient On (Oxygen Delivery Method): room air      LABORATORY:  CBC   Chem       LFTs   Coagulopathy   Lipid Panel   A1c   Cardiac enzymes     U/A   CSF  Other    STUDIES & IMAGING: (EEG, CT, MR, U/S, TTE/GERTRUDE): Neurology Consultation     HPI: Patient CP is a 88yo R-handed M PMHx irregular heart rhythm, BPH, possible dementia, stomach cancer, meningioma recent ED visit [facility] who presents to ED for fall then L sided weakness. Daughter Freedom at bedside states LKW 7AM 12/9 when she last saw him. He remained with other family members during the day and was found after a fall after which he had L sided weakness. He had another fall and they noted today that he had "twisted" his legs. Not on ac/ap agents. Of note, preesnted to [place] ED 11/26/22 for fall, and CT c spine neg for fractures. CT head showed small dural structure L frontal region possibly meningioma versus focal subdural hematoma that cannot be excluded. Also had focal cerebellar infarcts and b/l lacunar infarcts in/adjacent to BG of indeterminate age per the report family provided.       NIHSS:   preMRS:     PAST MEDICAL & SURGICAL HISTORY:    FAMILY HISTORY:    SOCIAL HISTORY: former drinker, little amount    MEDICATIONS (HOME):  Home Medications:    MEDICATIONS  (STANDING):    MEDICATIONS  (PRN):    ALLERGIES/INTOLERANCES:  Allergies  No Known Allergies    Intolerances    VITALS & EXAMINATION:  Vital Signs Last 24 Hrs  T(C): 36.7 (09 Dec 2022 19:16), Max: 36.7 (09 Dec 2022 19:16)  T(F): 98.1 (09 Dec 2022 19:16), Max: 98.1 (09 Dec 2022 19:16)  HR: 57 (09 Dec 2022 19:16) (57 - 57)  BP: 121/59 (09 Dec 2022 19:16) (121/59 - 121/59)  BP(mean): --  RR: 16 (09 Dec 2022 19:16) (16 - 16)  SpO2: 100% (09 Dec 2022 19:16) (100% - 100%)    Parameters below as of 09 Dec 2022 19:16  Patient On (Oxygen Delivery Method): room air      LABORATORY:  CBC   Chem       LFTs   Coagulopathy   Lipid Panel   A1c   Cardiac enzymes     U/A   CSF  Other    STUDIES & IMAGING: (EEG, CT, MR, U/S, TTE/GERTRUDE): Neurology Consultation     HPI: Patient CP is a 86yo R-handed M PMHx irregular heart rhythm, BPH, possible dementia, stomach cancer, meningioma recent ED visit [facility] who presents to ED for fall then L sided weakness. Daughter Freedom at bedside states LKW 7AM 12/9 when she last saw him. He remained with other family members during the day and was found after a fall after which he had L sided weakness. He had another fall and they noted today that he had "twisted" his legs. Not on ac/ap agents. Of note, preesnted to [place] ED 11/26/22 for fall, and CT c spine neg for fractures. CT head showed small dural structure L frontal region possibly meningioma versus focal subdural hematoma that cannot be excluded. Also had focal cerebellar infarcts and b/l lacunar infarcts in/adjacent to BG of indeterminate age per the report family provided.       NIHSS:   preMRS:     PAST MEDICAL & SURGICAL HISTORY:    FAMILY HISTORY:    SOCIAL HISTORY: former drinker, little amount    MEDICATIONS (HOME):  Home Medications:    MEDICATIONS  (STANDING):    MEDICATIONS  (PRN):    ALLERGIES/INTOLERANCES:  Allergies  No Known Allergies    Intolerances    VITALS & EXAMINATION:  Vital Signs Last 24 Hrs  T(C): 36.7 (09 Dec 2022 19:16), Max: 36.7 (09 Dec 2022 19:16)  T(F): 98.1 (09 Dec 2022 19:16), Max: 98.1 (09 Dec 2022 19:16)  HR: 57 (09 Dec 2022 19:16) (57 - 57)  BP: 121/59 (09 Dec 2022 19:16) (121/59 - 121/59)  BP(mean): --  RR: 16 (09 Dec 2022 19:16) (16 - 16)  SpO2: 100% (09 Dec 2022 19:16) (100% - 100%)    Parameters below as of 09 Dec 2022 19:16  Patient On (Oxygen Delivery Method): room air      LABORATORY:  CBC   Chem       LFTs   Coagulopathy   Lipid Panel   A1c   Cardiac enzymes     U/A   CSF  Other    STUDIES & IMAGING: (EEG, CT, MR, U/S, TTE/GERTRUDE): Neurology Consultation    Translated by daughter bedside as preferred by patient, Macedonian      HPI: Patient CP is a 88yo R-handed M PMHx irregular heart rhythm, BPH, possible dementia, stomach cancer, meningioma recent ED visit [facility] for fall, who presents to ED for 2x falls then found with L sided weakness. Daughter Freedom at bedside states LKW 7AM 12/9 when she last saw him. He remained with other family members during the day and was found after a fall after which he had L sided weakness. He had another fall and they noted that he had "twisted" his legs. Not on ac/ap agents. Of note, presented to [place] ED 11/26/22 for fall, and CT c spine neg for fractures. CT head showed small dural structure L frontal region possibly meningioma versus focal subdural hematoma that cannot be excluded. Also had focal cerebellar infarcts and b/l lacunar infarcts in/adjacent to BG of indeterminate age per the report family provided.       NIHSS: 5  preMRS: 2    PAST MEDICAL & SURGICAL HISTORY:    FAMILY HISTORY:    SOCIAL HISTORY: former drinker, little amount    MEDICATIONS (HOME):  Home Medications:    MEDICATIONS  (STANDING):    MEDICATIONS  (PRN):    ALLERGIES/INTOLERANCES:  Allergies  No Known Allergies    Intolerances    VITALS & EXAMINATION:  Vital Signs Last 24 Hrs  T(C): 36.7 (09 Dec 2022 19:16), Max: 36.7 (09 Dec 2022 19:16)  T(F): 98.1 (09 Dec 2022 19:16), Max: 98.1 (09 Dec 2022 19:16)  HR: 57 (09 Dec 2022 19:16) (57 - 57)  BP: 121/59 (09 Dec 2022 19:16) (121/59 - 121/59)  BP(mean): --  RR: 16 (09 Dec 2022 19:16) (16 - 16)  SpO2: 100% (09 Dec 2022 19:16) (100% - 100%)    Parameters below as of 09 Dec 2022 19:16  Patient On (Oxygen Delivery Method): room air    General:  Constitutional: Male, appears stated age, NAD, frail appearing, thin  Head: Normocephalic; Eyes: clear sclera;   Extremities: No cyanosis; Skin: No letty edema of LE  Resp: breathing comfortably     Neurological (>12):  MS: Awake, alert. Follows simple commands on multiple prompting. Attends to examiner  Language: Speech is hypophonic, clear, mildly to mod dysfluent (unclear if 2/2 dementia), has mild difficulty with repetition, comprehension.   CNs: pupils equal round and difficult to assess reactivity (R 2mm, L 2mm). Possible mild LHH with decreased blinking to confrontation. EOMI. V1-3 intact LT, No letty facial asymmetry b/l.      Motor - Reduced muscle bulk and mildly diffusely increased tone throughout. Mild LUE proximal drift      L/R         Deltoid  4+/5    Biceps   5/5      Triceps  4+/5         Wrist Extension 5/5   Wrist Flexion  5/5      5/5   L/R         Hip Flexion  3/5    Knee Extension  5/5  Dorsiflexion  5/5      Plantar Flexion 5/5     Sensation: Intact to LT b/l.    Reflexes L/R:  Biceps(C5) 2/2  BR(C6) 2/2   Triceps(C7)  2/2 Patellar(L4) 3/3 Ankle 2/2   Toes: L upgoing, R downgoing  Coordination: No letty dysmetria to FTN b/l UE  Gait: JUDAH    LABORATORY:                        13.0   5.23  )-----------( 181      ( 09 Dec 2022 19:40 )             39.7   12-09    135  |  103  |  10  ----------------------------<  191<H>  4.0   |  23  |  0.97    Ca    8.5      09 Dec 2022 19:40    TPro  6.4  /  Alb  3.5  /  TBili  0.6  /  DBili  x   /  AST  32  /  ALT  15  /  AlkPhos  65  12-09    LFTs   Coagulopathy   Lipid Panel   A1c   Cardiac enzymes     U/A   CSF  Other    STUDIES & IMAGING: (EEG, CT, MR, U/S, TTE/GERTRUDE):    < from: CT Angio Neck Stroke Protocol w/ IV Cont (12.09.22 @ 19:54) >    ACC: [id number] EXAM:  CT ANGIO BRAIN STROKE PROTC IC                        ACC: [id number] EXAM:  CT ANGIO NECK STROKE PROTCL IC                        ACC: [id number] EXAM:  CT BRAIN PERFUSION MAPS STROKE                          PROCEDURE DATE:  12/09/2022          INTERPRETATION:  CT PERFUSION WITH MAPS STROKE PROTOCOL, CT ANGIO NECK   STROKE PROTOCOL, CT ANGIO HEAD STROKE PROTOCOL  CT PERFUSION  CTA OF THE [place] OF BRAGG AND NECK:    INDICATIONS: Stroke Code. 86 yo M with a h/o recent brain bleed who   presents with frequent falls today. code stroke called. neuro at bedside   at CT. concern for worsening bleed vs stroke vs lyte abnormality vs   infection.  LKW 7 am, 2 falls, left sided weakness.  LCT  No additional clinical history was provided.    TECHNIQUE:  RAPID artificial intelligence was used for perfusion analysis and for   intracranial large vessel occlusion.    Contrast: 80 cc Omnipaque 350 administered. 0 cc discarded. 60 cc   Omnipaque 350 administered. 0 cc discarded.    CTA [place] OF BRAGG:  After the intravenous power injection of non-ionic contrast material,   serial thin sections were obtained through the intracranial circulation   on a multislice CT scanner.  Images were reformatted using a dedicated 3D   software package and viewed on a dedicated workstation in multiple planes.    CTA NECK:  After the intravenous power injection of non-ionic contrast material,   serial thin sections were obtained through the cervical circulation on a   multislice CT scanner. Images were reformatted using a dedicated 3D   software package and viewed on a dedicated workstation in multiple planes.    COMPARISON EXAMINATION: Noncontrast head CT performed immediately prior.    FINDINGS:    CT RAPID PERFUSION:  CBF<30% volume: 0 ml  Tmax>6.0 s volume: 0 ml  Mismatch volume: 0 ml  Mismatch ratio: none    CORE INFARCT: None.  TISSUE AT RISK: None.  MISMATCH RATIO: None.      CTA [place] OF BRAGG:  ANTERIOR CIRCULATION  ICA  CAVERNOUS, SUPRACLINOID, BIFURCATION SEGMENTS: Patent without flow   limiting stenosis.    ANTERIOR CEREBRAL ARTERIES: Bilateral A1, anterior communicating and A2   anterior cerebral arteries are unremarkable in course and caliber without   flow limiting stenosis.    MIDDLE CEREBRAL ARTERIES: Patent bilateral M1, M2, and distal MCA   branches without flow limiting stenosis. Possible slightly decreased   ossification of smaller vessels in the right MCA distal branches.      POSTERIOR CIRCULATION:  VERTEBRAL ARTERIES: Patent without flow limiting stenosis.  BASILAR ARTERY: Patent no flow limiting stenosis.  POSTERIOR CEREBRAL ARTERIES: Patent without flow limiting stenosis.      CTA NECK:  GREAT VESSELS: Visualized segments are patent, no flow limiting stenosis.    COMMON CAROTID ARTERIES:  RIGHT CCA: Patent without flow limiting stenosis  LEFT CCA: Patent without flow limiting stenosis    CAROTID BULBS:  RIGHT CB: Patent without flow limiting stenosis  LEFT CB: Patent without flow limiting stenosis    INTERNAL CAROTID ARTERIES:  RIGHT ICA:Patent no evidence for any hemodynamically significant   stenosis at the ICA origin by NASCET criteria.  LEFT ICA: Patent no evidence for any hemodynamically significant stenosis   at the ICA origin by NASCET criteria.    VERTEBRAL ARTERIES:  RIGHT VA: Patent no evidence for any flow limiting stenosis.  LEFT VA: Patent no evidence for any flow limiting stenosis.      SOFT TISSUES: Unremarkable  BONES: Unremarkable      IMPRESSION:    CT PERFUSION demonstrated: No core infarct. No active ischemia.  If symptoms persist consider follow up head CT or MRI, MRA  if no   contraindication.    CTA COW:  Patent intracranial circulation without flow limiting stenosis.   Possible slightly decreased ossification of smaller vessels in the right   MCA distal branches.    CTA NECK: Patent, ECAs, ICAs, no  hemodynamically significant stenosis at    ICA origins by NASCET criteria.  Bilateral vertebral arteries are patent without flow limiting stenosis.    Discussed with Dr. Meredith on the L IJ stroke team at8:11 PM. MRI may   provide helpful additional evaluation, if clinically indicated.    --- End of Report ---      < from: CT Brain Stroke Protocol (12.09.22 @ 19:46) >    ACC: [id number] EXAM:  CT BRAIN STROKE PROTOCOL                          PROCEDURE DATE:  12/09/2022          INTERPRETATION:  CT HEAD STROKE PROTOCOL  HEAD CT    INDICATIONS: Stroke Code. 86 yo M with a h/o recent brain bleed who   presents with frequent falls today. code stroke called. neuro at bedside   at CT. concern for worsening bleed vs stroke vs lyte abnormality vs   infection.  LKW 7 am, 2 falls, left sided weakness. LCT. No additional   history was provided.    TECHNIQUE:  HEAD CT:  Serial axial images were obtained from the skull base to the vertex   without the use of intravenous contrast. RAPID artificial intelligence   was used for preliminary evaluation of intracranial hemorrhage.    COMPARISON EXAMINATION: None.    FINDINGS:  HEAD CT:  VENTRICLES AND SULCI: Ventricles and sulci are unremarkable for patient   age.  INTRA-AXIAL: No intracranial mass, acute hemorrhage, or midline shift is   present.There is non-specific decreased attenuation in the white matter   likely microvascular disease.  EXTRA-AXIAL: No extra-axial fluid collection is present. Left frontal   probable meningioma 1.6 cm x 0.9 cm, image 2-25.  INTRACRANIAL HEMORRHAGE: None.    VISUALIZED SINUSES: Small bilateral axillary sinus air-fluid levels with   mild mucosal thickening scattered throughout the sinuses.  VISUALIZED MASTOIDS:  Clear.  CALVARIUM:  Intact.  MISCELLANEOUS:  None.    SOFT TISSUES: Unremarkable.  BONES: Unremarkable.      IMPRESSION:  HEAD CT: Mild volume loss, microvascular disease,no acute hemorrhage or   midline shift.  Left frontal probable meningioma 1.6 cm x 0.9 cm    Discussed with Dr. Meredith on the L [facility] stroke team at 7:47 PM.    --- End of Report --- Neurology Consultation    Translated by daughter bedside as preferred by patient, Frisian      HPI: Patient CP is a 88yo R-handed M PMHx irregular heart rhythm, BPH, possible dementia, stomach cancer, meningioma recent ED visit [facility] for fall, who presents to ED for 2x falls then found with L sided weakness. Daughter Freedom at bedside states LKW 7AM 12/9 when she last saw him. He remained with other family members during the day and was found after a fall after which he had L sided weakness. He had another fall and they noted that he had "twisted" his legs. Not on ac/ap agents. Of note, presented to [place] ED 11/26/22 for fall, and CT c spine neg for fractures. CT head showed small dural structure L frontal region possibly meningioma versus focal subdural hematoma that cannot be excluded. Also had focal cerebellar infarcts and b/l lacunar infarcts in/adjacent to BG of indeterminate age per the report family provided.       NIHSS: 5  preMRS: 2    PAST MEDICAL & SURGICAL HISTORY:    FAMILY HISTORY:    SOCIAL HISTORY: former drinker, little amount    MEDICATIONS (HOME):  Home Medications:    MEDICATIONS  (STANDING):    MEDICATIONS  (PRN):    ALLERGIES/INTOLERANCES:  Allergies  No Known Allergies    Intolerances    VITALS & EXAMINATION:  Vital Signs Last 24 Hrs  T(C): 36.7 (09 Dec 2022 19:16), Max: 36.7 (09 Dec 2022 19:16)  T(F): 98.1 (09 Dec 2022 19:16), Max: 98.1 (09 Dec 2022 19:16)  HR: 57 (09 Dec 2022 19:16) (57 - 57)  BP: 121/59 (09 Dec 2022 19:16) (121/59 - 121/59)  BP(mean): --  RR: 16 (09 Dec 2022 19:16) (16 - 16)  SpO2: 100% (09 Dec 2022 19:16) (100% - 100%)    Parameters below as of 09 Dec 2022 19:16  Patient On (Oxygen Delivery Method): room air    General:  Constitutional: Male, appears stated age, NAD, frail appearing, thin  Head: Normocephalic; Eyes: clear sclera;   Extremities: No cyanosis; Skin: No letty edema of LE  Resp: breathing comfortably     Neurological (>12):  MS: Awake, alert. Follows simple commands on multiple prompting. Attends to examiner  Language: Speech is hypophonic, clear, mildly to mod dysfluent (unclear if 2/2 dementia), has mild difficulty with repetition, comprehension.   CNs: pupils equal round and difficult to assess reactivity (R 2mm, L 2mm). Possible mild LHH with decreased blinking to confrontation. EOMI. V1-3 intact LT, No letty facial asymmetry b/l.      Motor - Reduced muscle bulk and mildly diffusely increased tone throughout. Mild LUE proximal drift      L/R         Deltoid  4+/5    Biceps   5/5      Triceps  4+/5         Wrist Extension 5/5   Wrist Flexion  5/5      5/5   L/R         Hip Flexion  3/5    Knee Extension  5/5  Dorsiflexion  5/5      Plantar Flexion 5/5     Sensation: Intact to LT b/l.    Reflexes L/R:  Biceps(C5) 2/2  BR(C6) 2/2   Triceps(C7)  2/2 Patellar(L4) 3/3 Ankle 2/2   Toes: L upgoing, R downgoing  Coordination: No letty dysmetria to FTN b/l UE  Gait: JUDAH    LABORATORY:                        13.0   5.23  )-----------( 181      ( 09 Dec 2022 19:40 )             39.7   12-09    135  |  103  |  10  ----------------------------<  191<H>  4.0   |  23  |  0.97    Ca    8.5      09 Dec 2022 19:40    TPro  6.4  /  Alb  3.5  /  TBili  0.6  /  DBili  x   /  AST  32  /  ALT  15  /  AlkPhos  65  12-09    LFTs   Coagulopathy   Lipid Panel   A1c   Cardiac enzymes     U/A   CSF  Other    STUDIES & IMAGING: (EEG, CT, MR, U/S, TTE/GERTRUDE):    < from: CT Angio Neck Stroke Protocol w/ IV Cont (12.09.22 @ 19:54) >    ACC: [id number] EXAM:  CT ANGIO BRAIN STROKE PROTC IC                        ACC: [id number] EXAM:  CT ANGIO NECK STROKE PROTCL IC                        ACC: [id number] EXAM:  CT BRAIN PERFUSION MAPS STROKE                          PROCEDURE DATE:  12/09/2022          INTERPRETATION:  CT PERFUSION WITH MAPS STROKE PROTOCOL, CT ANGIO NECK   STROKE PROTOCOL, CT ANGIO HEAD STROKE PROTOCOL  CT PERFUSION  CTA OF THE [place] OF BRAGG AND NECK:    INDICATIONS: Stroke Code. 88 yo M with a h/o recent brain bleed who   presents with frequent falls today. code stroke called. neuro at bedside   at CT. concern for worsening bleed vs stroke vs lyte abnormality vs   infection.  LKW 7 am, 2 falls, left sided weakness.  LCT  No additional clinical history was provided.    TECHNIQUE:  RAPID artificial intelligence was used for perfusion analysis and for   intracranial large vessel occlusion.    Contrast: 80 cc Omnipaque 350 administered. 0 cc discarded. 60 cc   Omnipaque 350 administered. 0 cc discarded.    CTA [place] OF BRAGG:  After the intravenous power injection of non-ionic contrast material,   serial thin sections were obtained through the intracranial circulation   on a multislice CT scanner.  Images were reformatted using a dedicated 3D   software package and viewed on a dedicated workstation in multiple planes.    CTA NECK:  After the intravenous power injection of non-ionic contrast material,   serial thin sections were obtained through the cervical circulation on a   multislice CT scanner. Images were reformatted using a dedicated 3D   software package and viewed on a dedicated workstation in multiple planes.    COMPARISON EXAMINATION: Noncontrast head CT performed immediately prior.    FINDINGS:    CT RAPID PERFUSION:  CBF<30% volume: 0 ml  Tmax>6.0 s volume: 0 ml  Mismatch volume: 0 ml  Mismatch ratio: none    CORE INFARCT: None.  TISSUE AT RISK: None.  MISMATCH RATIO: None.      CTA [place] OF BRAGG:  ANTERIOR CIRCULATION  ICA  CAVERNOUS, SUPRACLINOID, BIFURCATION SEGMENTS: Patent without flow   limiting stenosis.    ANTERIOR CEREBRAL ARTERIES: Bilateral A1, anterior communicating and A2   anterior cerebral arteries are unremarkable in course and caliber without   flow limiting stenosis.    MIDDLE CEREBRAL ARTERIES: Patent bilateral M1, M2, and distal MCA   branches without flow limiting stenosis. Possible slightly decreased   ossification of smaller vessels in the right MCA distal branches.      POSTERIOR CIRCULATION:  VERTEBRAL ARTERIES: Patent without flow limiting stenosis.  BASILAR ARTERY: Patent no flow limiting stenosis.  POSTERIOR CEREBRAL ARTERIES: Patent without flow limiting stenosis.      CTA NECK:  GREAT VESSELS: Visualized segments are patent, no flow limiting stenosis.    COMMON CAROTID ARTERIES:  RIGHT CCA: Patent without flow limiting stenosis  LEFT CCA: Patent without flow limiting stenosis    CAROTID BULBS:  RIGHT CB: Patent without flow limiting stenosis  LEFT CB: Patent without flow limiting stenosis    INTERNAL CAROTID ARTERIES:  RIGHT ICA:Patent no evidence for any hemodynamically significant   stenosis at the ICA origin by NASCET criteria.  LEFT ICA: Patent no evidence for any hemodynamically significant stenosis   at the ICA origin by NASCET criteria.    VERTEBRAL ARTERIES:  RIGHT VA: Patent no evidence for any flow limiting stenosis.  LEFT VA: Patent no evidence for any flow limiting stenosis.      SOFT TISSUES: Unremarkable  BONES: Unremarkable      IMPRESSION:    CT PERFUSION demonstrated: No core infarct. No active ischemia.  If symptoms persist consider follow up head CT or MRI, MRA  if no   contraindication.    CTA COW:  Patent intracranial circulation without flow limiting stenosis.   Possible slightly decreased ossification of smaller vessels in the right   MCA distal branches.    CTA NECK: Patent, ECAs, ICAs, no  hemodynamically significant stenosis at    ICA origins by NASCET criteria.  Bilateral vertebral arteries are patent without flow limiting stenosis.    Discussed with Dr. Meredith on the [facility] stroke team at8:11 PM. MRI may   provide helpful additional evaluation, if clinically indicated.    --- End of Report ---      < from: CT Brain Stroke Protocol (12.09.22 @ 19:46) >    ACC: [id number] EXAM:  CT BRAIN STROKE PROTOCOL                          PROCEDURE DATE:  12/09/2022          INTERPRETATION:  CT HEAD STROKE PROTOCOL  HEAD CT    INDICATIONS: Stroke Code. 88 yo M with a h/o recent brain bleed who   presents with frequent falls today. code stroke called. neuro at bedside   at CT. concern for worsening bleed vs stroke vs lyte abnormality vs   infection.  LKW 7 am, 2 falls, left sided weakness. LCT. No additional   history was provided.    TECHNIQUE:  HEAD CT:  Serial axial images were obtained from the skull base to the vertex   without the use of intravenous contrast. RAPID artificial intelligence   was used for preliminary evaluation of intracranial hemorrhage.    COMPARISON EXAMINATION: None.    FINDINGS:  HEAD CT:  VENTRICLES AND SULCI: Ventricles and sulci are unremarkable for patient   age.  INTRA-AXIAL: No intracranial mass, acute hemorrhage, or midline shift is   present.There is non-specific decreased attenuation in the white matter   likely microvascular disease.  EXTRA-AXIAL: No extra-axial fluid collection is present. Left frontal   probable meningioma 1.6 cm x 0.9 cm, image 2-25.  INTRACRANIAL HEMORRHAGE: None.    VISUALIZED SINUSES: Small bilateral axillary sinus air-fluid levels with   mild mucosal thickening scattered throughout the sinuses.  VISUALIZED MASTOIDS:  Clear.  CALVARIUM:  Intact.  MISCELLANEOUS:  None.    SOFT TISSUES: Unremarkable.  BONES: Unremarkable.      IMPRESSION:  HEAD CT: Mild volume loss, microvascular disease,no acute hemorrhage or   midline shift.  Left frontal probable meningioma 1.6 cm x 0.9 cm    Discussed with Dr. Meredith on the [facility] stroke team at 7:47 PM.    --- End of Report --- Neurology Consultation    Translated by daughter bedside as preferred by patient, Georgian      HPI: Patient CP is a 88yo R-handed M PMHx irregular heart rhythm, BPH, possible dementia, stomach cancer, meningioma recent ED visit [facility] for fall, who presents to ED for 2x falls then found with L sided weakness. Daughter Freedom at bedside states LKW 7AM 12/9 when she last saw him. He remained with other family members during the day and was found after a fall after which he had L sided weakness. He had another fall and they noted that he had "twisted" his legs. Not on ac/ap agents. Of note, presented to [facility] ED 11/26/22 for fall, and CT c spine neg for fractures. CT head showed small dural structure L frontal region possibly meningioma versus focal subdural hematoma that cannot be excluded. Also had focal cerebellar infarcts and b/l lacunar infarcts in/adjacent to BG of indeterminate age per the report family provided.       NIHSS: 5  preMRS: 2    PAST MEDICAL & SURGICAL HISTORY:    FAMILY HISTORY:    SOCIAL HISTORY: former drinker, little amount    MEDICATIONS (HOME):  Home Medications:    MEDICATIONS  (STANDING):    MEDICATIONS  (PRN):    ALLERGIES/INTOLERANCES:  Allergies  No Known Allergies    Intolerances    VITALS & EXAMINATION:  Vital Signs Last 24 Hrs  T(C): 36.7 (09 Dec 2022 19:16), Max: 36.7 (09 Dec 2022 19:16)  T(F): 98.1 (09 Dec 2022 19:16), Max: 98.1 (09 Dec 2022 19:16)  HR: 57 (09 Dec 2022 19:16) (57 - 57)  BP: 121/59 (09 Dec 2022 19:16) (121/59 - 121/59)  BP(mean): --  RR: 16 (09 Dec 2022 19:16) (16 - 16)  SpO2: 100% (09 Dec 2022 19:16) (100% - 100%)    Parameters below as of 09 Dec 2022 19:16  Patient On (Oxygen Delivery Method): room air    General:  Constitutional: Male, appears stated age, NAD, frail appearing, thin  Head: Normocephalic; Eyes: clear sclera;   Extremities: No cyanosis; Skin: No letty edema of LE  Resp: breathing comfortably     Neurological (>12):  MS: Awake, alert. Follows simple commands on multiple prompting. Attends to examiner  Language: Speech is hypophonic, clear, mildly to mod dysfluent (unclear if 2/2 dementia), has mild difficulty with repetition, comprehension.   CNs: pupils equal round and difficult to assess reactivity (R 2mm, L 2mm). Possible mild LHH with decreased blinking to confrontation. EOMI. V1-3 intact LT, No letty facial asymmetry b/l.      Motor - Reduced muscle bulk and mildly diffusely increased tone throughout. Mild LUE proximal drift      L/R         Deltoid  4+/5    Biceps   5/5      Triceps  4+/5         Wrist Extension 5/5   Wrist Flexion  5/5      5/5   L/R         Hip Flexion  3/5    Knee Extension  5/5  Dorsiflexion  5/5      Plantar Flexion 5/5     Sensation: Intact to LT b/l.    Reflexes L/R:  Biceps(C5) 2/2  BR(C6) 2/2   Triceps(C7)  2/2 Patellar(L4) 3/3 Ankle 2/2   Toes: L upgoing, R downgoing  Coordination: No letty dysmetria to FTN b/l UE  Gait: JUDAH    LABORATORY:                        13.0   5.23  )-----------( 181      ( 09 Dec 2022 19:40 )             39.7   12-09    135  |  103  |  10  ----------------------------<  191<H>  4.0   |  23  |  0.97    Ca    8.5      09 Dec 2022 19:40    TPro  6.4  /  Alb  3.5  /  TBili  0.6  /  DBili  x   /  AST  32  /  ALT  15  /  AlkPhos  65  12-09    LFTs   Coagulopathy   Lipid Panel   A1c   Cardiac enzymes     U/A   CSF  Other    STUDIES & IMAGING: (EEG, CT, MR, U/S, TTE/GERTRUDE):    < from: CT Angio Neck Stroke Protocol w/ IV Cont (12.09.22 @ 19:54) >    ACC: [id number] EXAM:  CT ANGIO BRAIN STROKE PROTC IC                        ACC: [id number] EXAM:  CT ANGIO NECK STROKE PROTCL IC                        ACC: [id number] EXAM:  CT BRAIN PERFUSION MAPS STROKE                          PROCEDURE DATE:  12/09/2022          INTERPRETATION:  CT PERFUSION WITH MAPS STROKE PROTOCOL, CT ANGIO NECK   STROKE PROTOCOL, CT ANGIO HEAD STROKE PROTOCOL  CT PERFUSION  CTA OF THE [place] OF [place]G AND NECK:    INDICATIONS: Stroke Code. 88 yo M with a h/o recent brain bleed who   presents with frequent falls today. code stroke called. neuro at bedside   at CT. concern for worsening bleed vs stroke vs lyte abnormality vs   infection.  LKW 7 am, 2 falls, left sided weakness.  LCT  No additional clinical history was provided.    TECHNIQUE:  RAPID artificial intelligence was used for perfusion analysis and for   intracranial large vessel occlusion.    Contrast: 80 cc Omnipaque 350 administered. 0 cc discarded. 60 cc   Omnipaque 350 administered. 0 cc discarded.    CTA [place] OF [place]:  After the intravenous power injection of non-ionic contrast material,   serial thin sections were obtained through the intracranial circulation   on a multislice CT scanner.  Images were reformatted using a dedicated 3D   software package and viewed on a dedicated workstation in multiple planes.    CTA NECK:  After the intravenous power injection of non-ionic contrast material,   serial thin sections were obtained through the cervical circulation on a   multislice CT scanner. Images were reformatted using a dedicated 3D   software package and viewed on a dedicated workstation in multiple planes.    COMPARISON EXAMINATION: Noncontrast head CT performed immediately prior.    FINDINGS:    CT RAPID PERFUSION:  CBF<30% volume: 0 ml  Tmax>6.0 s volume: 0 ml  Mismatch volume: 0 ml  Mismatch ratio: none    CORE INFARCT: None.  TISSUE AT RISK: None.  MISMATCH RATIO: None.      CTA [place] OF [place]:  ANTERIOR CIRCULATION  ICA  CAVERNOUS, SUPRACLINOID, BIFURCATION SEGMENTS: Patent without flow   limiting stenosis.    ANTERIOR CEREBRAL ARTERIES: Bilateral A1, anterior communicating and A2   anterior cerebral arteries are unremarkable in course and caliber without   flow limiting stenosis.    MIDDLE CEREBRAL ARTERIES: Patent bilateral M1, M2, and distal MCA   branches without flow limiting stenosis. Possible slightly decreased   ossification of smaller vessels in the right MCA distal branches.      POSTERIOR CIRCULATION:  VERTEBRAL ARTERIES: Patent without flow limiting stenosis.  BASILAR ARTERY: Patent no flow limiting stenosis.  POSTERIOR CEREBRAL ARTERIES: Patent without flow limiting stenosis.      CTA NECK:  GREAT VESSELS: Visualized segments are patent, no flow limiting stenosis.    COMMON CAROTID ARTERIES:  RIGHT CCA: Patent without flow limiting stenosis  LEFT CCA: Patent without flow limiting stenosis    CAROTID BULBS:  RIGHT CB: Patent without flow limiting stenosis  LEFT CB: Patent without flow limiting stenosis    INTERNAL CAROTID ARTERIES:  RIGHT ICA:Patent no evidence for any hemodynamically significant   stenosis at the ICA origin by NASCET criteria.  LEFT ICA: Patent no evidence for any hemodynamically significant stenosis   at the ICA origin by NASCET criteria.    VERTEBRAL ARTERIES:  RIGHT VA: Patent no evidence for any flow limiting stenosis.  LEFT VA: Patent no evidence for any flow limiting stenosis.      SOFT TISSUES: Unremarkable  BONES: Unremarkable      IMPRESSION:    CT PERFUSION demonstrated: No core infarct. No active ischemia.  If symptoms persist consider follow up head CT or MRI, MRA  if no   contraindication.    CTA COW:  Patent intracranial circulation without flow limiting stenosis.   Possible slightly decreased ossification of smaller vessels in the right   MCA distal branches.    CTA NECK: Patent, ECAs, ICAs, no  hemodynamically significant stenosis at    ICA origins by NASCET criteria.  Bilateral vertebral arteries are patent without flow limiting stenosis.    Discussed with Dr. Meredith on the [facility] stroke team at8:11 PM. MRI may   provide helpful additional evaluation, if clinically indicated.    --- End of Report ---      < from: CT Brain Stroke Protocol (12.09.22 @ 19:46) >    ACC: [id number] EXAM:  CT BRAIN STROKE PROTOCOL                          PROCEDURE DATE:  12/09/2022          INTERPRETATION:  CT HEAD STROKE PROTOCOL  HEAD CT    INDICATIONS: Stroke Code. 88 yo M with a h/o recent brain bleed who   presents with frequent falls today. code stroke called. neuro at bedside   at CT. concern for worsening bleed vs stroke vs lyte abnormality vs   infection.  LKW 7 am, 2 falls, left sided weakness. LCT. No additional   history was provided.    TECHNIQUE:  HEAD CT:  Serial axial images were obtained from the skull base to the vertex   without the use of intravenous contrast. RAPID artificial intelligence   was used for preliminary evaluation of intracranial hemorrhage.    COMPARISON EXAMINATION: None.    FINDINGS:  HEAD CT:  VENTRICLES AND SULCI: Ventricles and sulci are unremarkable for patient   age.  INTRA-AXIAL: No intracranial mass, acute hemorrhage, or midline shift is   present.There is non-specific decreased attenuation in the white matter   likely microvascular disease.  EXTRA-AXIAL: No extra-axial fluid collection is present. Left frontal   probable meningioma 1.6 cm x 0.9 cm, image 2-25.  INTRACRANIAL HEMORRHAGE: None.    VISUALIZED SINUSES: Small bilateral axillary sinus air-fluid levels with   mild mucosal thickening scattered throughout the sinuses.  VISUALIZED MASTOIDS:  Clear.  CALVARIUM:  Intact.  MISCELLANEOUS:  None.    SOFT TISSUES: Unremarkable.  BONES: Unremarkable.      IMPRESSION:  HEAD CT: Mild volume loss, microvascular disease,no acute hemorrhage or   midline shift.  Left frontal probable meningioma 1.6 cm x 0.9 cm    Discussed with Dr. Meredith on the [facility] stroke team at 7:47 PM.    --- End of Report ---

## 2022-12-09 NOTE — ED ADULT NURSE NOTE - NSIMPLEMENTINTERV_GEN_ALL_ED
Implemented All Fall with Harm Risk Interventions:  [place] to call system. Call bell, personal items and telephone within reach. Instruct patient to call for assistance. Room bathroom lighting operational. Non-slip footwear when patient is off stretcher. Physically safe environment: no spills, clutter or unnecessary equipment. Stretcher in lowest position, wheels locked, appropriate side rails in place. Provide visual cue, wrist band, yellow gown, etc. Monitor gait and stability. Monitor for mental status changes and reorient to person, place, and time. Review medications for side effects contributing to fall risk. Reinforce activity limits and safety measures with patient and family. Provide visual clues: red socks. Implemented All Fall with Harm Risk Interventions:  [place] to call system. Call bell, personal items and telephone within reach. Instruct patient to call for assistance. Room bathroom lighting operational. Non-slip footwear when patient is off stretcher. Physically safe environment: no spills, clutter or unnecessary equipment. Stretcher in lowest position, wheels locked, appropriate side rails in place. Provide visual cue, wrist band, yellow gown, etc. Monitor gait and stability. Monitor for mental status changes and reorient to person, place, and time. Review medications for side effects contributing to fall risk. Reinforce activity limits and safety measures with patient and family. Provide visual clues: red socks. Implemented All Fall with Harm Risk Interventions:  [place] to call system. Call bell, personal items and telephone within reach. Instruct patient to call for assistance. Room bathroom lighting operational. Non-slip footwear when patient is off stretcher. Physically safe environment: no spills, clutter or unnecessary equipment. Stretcher in lowest position, wheels locked, appropriate side rails in place. Provide visual cue, wrist band, yellow gown, etc. Monitor gait and stability. Monitor for mental status changes and reorient to person, place, and time. Review medications for side effects contributing to fall risk. Reinforce activity limits and safety measures with patient and family. Provide visual clues: red socks.

## 2022-12-09 NOTE — ED ADULT NURSE REASSESSMENT NOTE - NSIMPLEMENTINTERV_GEN_ALL_ED
Implemented All Fall with Harm Risk Interventions:  [place] to call system. Call bell, personal items and telephone within reach. Instruct patient to call for assistance. Room bathroom lighting operational. Non-slip footwear when patient is off stretcher. Physically safe environment: no spills, clutter or unnecessary equipment. Stretcher in lowest position, wheels locked, appropriate side rails in place. Provide visual cue, wrist band, yellow gown, etc. Monitor gait and stability. Monitor for mental status changes and reorient to person, place, and time. Review medications for side effects contributing to fall risk. Reinforce activity limits and safety measures with patient and family. Provide visual clues: red socks. Implemented All Fall with Harm Risk Interventions:  [place] to call system. Call bell, personal items and telephone within reach. Instruct patient to call for assistance. Room bathroom lighting operational. Non-slip footwear when patient is off stretcher. Physically safe environment: no spills, clutter or unnecessary equipment. Stretcher in lowest position, wheels locked, appropriate side rails in place. Provide visual cue, wrist band, yellow gown, etc. Monitor gait and stability. Monitor for mental status changes and reorient to person, place, and time. Review medications for side effects contributing to fall risk. Reinforce activity limits and safety measures with patient and family. Provide visual clues: red socks. Implemented All Fall with Harm Risk Interventions:  [place] to call system. Call bell, personal items and telephone within reach. Instruct patient to call for assistance. Room bathroom lighting operational. Non-slip footwear when patient is off stretcher. Physically safe environment: no spills, clutter or unnecessary equipment. Stretcher in lowest position, wheels locked, appropriate side rails in place. Provide visual cue, wrist band, yellow gown, etc. Monitor gait and stability. Monitor for mental status changes and reorient to person, place, and time. Review medications for side effects contributing to fall risk. Reinforce activity limits and safety measures with patient and family. Provide visual clues: red socks.

## 2022-12-09 NOTE — ED ADULT NURSE NOTE - PAIN RATING/NUMBER SCALE (0-10): REST
ACUTE PHYSICAL THERAPY GOALS:  (Developed with and agreed upon by patient and/or caregiver. )  LTG:  (1.)Ms. Jacklyn Downey will move from supine to sit and sit to supine , scoot up and down and roll side to side in bed with INDEPENDENCE within 7 treatment day(s). (2.)Ms. Jacklyn Downey will transfer from bed to chair and chair to bed with INDEPENDENCE using the least restrictive device within 7 treatment day(s). (3.)Ms. Jacklyn Downey will ambulate with MODIFIED INDEPENDENCE for a minimum of 200 feet with the least restrictive device within 7 treatment day(s). TTWB RLE  PHYSICAL THERAPY: Daily Note and AM Treatment Day # 2    Ale Zhao is a 71 y.o. female   PRIMARY DIAGNOSIS: Fracture of femoral neck, right, closed ([facility]  [facility] [S72.001A]  Procedure(s) (LRB):  HIP PERCUTANEOUS PINNING CANNULATED SCREWS RIGHT (Right)  2 Days Post-Op    ASSESSMENT:     REHAB RECOMMENDATIONS: CURRENT LEVEL OF FUNCTION:  (Most Recently Demonstrated)   Recommendation to date pending progress:  [address] [place] [address] Therapy  Equipment:    Rolling Walker Bed Mobility:   Modified Independent  Sit to Stand:   Supervision  Transfers:   Not tested  Gait/Mobility:   Supervision     ASSESSMENT:  Ms. Jacklyn Downey continues to make great progress towards PT goals as noted by increased gait distance and overall mobility. Will continue efforts.       SUBJECTIVE:   Ms. Jacklyn Downey states, \"I need a walker\"    SOCIAL HISTORY/ LIVING ENVIRONMENT: Refer to [facility]     OBJECTIVE:     PAIN: VITAL SIGNS: LINES/DRAINS:   Pre Treatment: Pain Screen  Pain Scale 1: FLACC  Pain Intensity 1: 0  Post Treatment: 5   none  O2 Device: Nasal cannula     MOBILITY: I Mod I S SBA CGA Min Mod Max Total  NT x2 Comments:   Bed Mobility    Rolling [] [] [] [] [] [] [] [] [] [x] []    Supine to Sit [] [x] [] [] [] [] [] [] [] [] []    Scooting [] [] [] [] [] [] [] [] [] [] []    Sit to Supine [] [] [] [] [] [] [] [] [] [] []    Transfers    Sit to Stand [] [] [x] [] [] [] [] [] [] [] []    Bed to Chair [] [] [x] [] [] [] [] [] [] [] []    Stand to Sit [] [] [x] [] [] [] [] [] [] [] []    I=Independent, Mod I=Modified Independent, S=Supervision, SBA=Standby Assistance, CGA=Contact Guard Assistance,   Min=Minimal Assistance, Mod=Moderate Assistance, Max=Maximal Assistance, Total=Total Assistance, NT=Not Tested    BALANCE: Good Fair+ Fair Fair- Poor NT Comments   Sitting Static [x] [] [] [] [] []    Sitting Dynamic [x] [] [] [] [] []              Standing Static [x] [] [] [] [] []    Standing Dynamic [] [x] [] [] [] []      GAIT: I Mod I S SBA CGA Min Mod Max Total  NT x2 Comments:   Level of Assistance [] [] [x] [x] [] [] [] [] [] [] []    Distance 200'    DME Rolling Walker    Gait Quality     Weightbearing  Status TTWB     I=Independent, Mod I=Modified Independent, S=Supervision, SBA=Standby Assistance, CGA=Contact Guard Assistance,   Min=Minimal Assistance, Mod=Moderate Assistance, Max=Maximal Assistance, Total=Total Assistance, NT=Not Tested    PLAN:   FREQUENCY/DURATION: PT Plan of Care: BID for duration of hospital stay or until stated goals are met, whichever comes first.  TREATMENT:     TREATMENT:   (   $$23 Minutes Therapeutic Activity)  Therapeutic Activity (23 Minutes): Therapeutic activity included Supine to Sit, Scooting, Transfer Training, Ambulation on level ground, Sitting balance , Standing balance and LE exercises to improve functional Mobility, Strength and Activity tolerance.     TREATMENT GRID:     Date:  6/20/21 Date:   Date:     ACTIVITY/EXERCISE AM PM AM PM AM PM   AP  x15       SLR  2x10       Heel slides  2x10       ABD slides  2x10                                  B = bilateral; AA = active assistive; A = active; P = passive    AFTER TREATMENT POSITION/PRECAUTIONS:  Chair, Needs within reach and RN notified    INTERDISCIPLINARY COLLABORATION:  RN/PCT and PT/PTA    TOTAL TREATMENT DURATION:  PT Patient Time In/Time Out  Time In: 0910  Time Out: [id number] [facility] [place], [facility] [place] [facility]

## 2022-12-10 DIAGNOSIS — R53.1 WEAKNESS: ICD-10-CM

## 2022-12-10 DIAGNOSIS — Z79.899 OTHER LONG TERM (CURRENT) DRUG THERAPY: ICD-10-CM

## 2022-12-10 DIAGNOSIS — R39.11 HESITANCY OF MICTURITION: ICD-10-CM

## 2022-12-10 DIAGNOSIS — Z51.11 ENCOUNTER FOR ANTINEOPLASTIC CHEMOTHERAPY: Chronic | ICD-10-CM

## 2022-12-10 DIAGNOSIS — Z29.9 ENCOUNTER FOR PROPHYLACTIC MEASURES, UNSPECIFIED: ICD-10-CM

## 2022-12-10 DIAGNOSIS — Z98.890 OTHER SPECIFIED POSTPROCEDURAL STATES: Chronic | ICD-10-CM

## 2022-12-10 DIAGNOSIS — I48.0 PAROXYSMAL ATRIAL FIBRILLATION: ICD-10-CM

## 2022-12-10 DIAGNOSIS — E83.39 OTHER DISORDERS OF PHOSPHORUS METABOLISM: ICD-10-CM

## 2022-12-10 DIAGNOSIS — R00.1 BRADYCARDIA, UNSPECIFIED: ICD-10-CM

## 2022-12-10 DIAGNOSIS — E07.9 DISORDER OF THYROID, UNSPECIFIED: ICD-10-CM

## 2022-12-10 DIAGNOSIS — S22.000A WEDGE COMPRESSION FRACTURE OF UNSPECIFIED THORACIC VERTEBRA, INITIAL ENCOUNTER FOR CLOSED FRACTURE: ICD-10-CM

## 2022-12-10 DIAGNOSIS — R09.89 OTHER SPECIFIED SYMPTOMS AND SIGNS INVOLVING THE CIRCULATORY AND RESPIRATORY SYSTEMS: ICD-10-CM

## 2022-12-10 DIAGNOSIS — R42 DIZZINESS AND GIDDINESS: ICD-10-CM

## 2022-12-10 LAB
A1C WITH ESTIMATED AVERAGE GLUCOSE RESULT: 5.7 % — HIGH (ref 4–5.6)
ANION GAP SERPL CALC-SCNC: 7 MMOL/L — SIGNIFICANT CHANGE UP (ref 7–14)
BUN SERPL-MCNC: 9 MG/DL — SIGNIFICANT CHANGE UP (ref 7–23)
CALCIUM SERPL-MCNC: 8.1 MG/DL — LOW (ref 8.4–10.5)
CHLORIDE SERPL-SCNC: 107 MMOL/L — SIGNIFICANT CHANGE UP (ref 98–107)
CO2 SERPL-SCNC: 25 MMOL/L — SIGNIFICANT CHANGE UP (ref 22–31)
CREAT SERPL-MCNC: 0.83 MG/DL — SIGNIFICANT CHANGE UP (ref 0.5–1.3)
EGFR: 85 ML/MIN/1.73M2 — SIGNIFICANT CHANGE UP
ESTIMATED AVERAGE GLUCOSE: 117 — SIGNIFICANT CHANGE UP
FOLATE SERPL-MCNC: 18.7 NG/ML — HIGH (ref 3.1–17.5)
GLUCOSE SERPL-MCNC: 96 MG/DL — SIGNIFICANT CHANGE UP (ref 70–99)
HCYS SERPL-MCNC: 9.4 UMOL/L — SIGNIFICANT CHANGE UP
MAGNESIUM SERPL-MCNC: 2 MG/DL — SIGNIFICANT CHANGE UP (ref 1.6–2.6)
PHOSPHATE SERPL-MCNC: 2.4 MG/DL — LOW (ref 2.5–4.5)
POTASSIUM SERPL-MCNC: 3.5 MMOL/L — SIGNIFICANT CHANGE UP (ref 3.5–5.3)
POTASSIUM SERPL-SCNC: 3.5 MMOL/L — SIGNIFICANT CHANGE UP (ref 3.5–5.3)
SODIUM SERPL-SCNC: 139 MMOL/L — SIGNIFICANT CHANGE UP (ref 135–145)
T4 AB SER-ACNC: 6.83 UG/DL — SIGNIFICANT CHANGE UP (ref 5.1–13)
TROPONIN T, HIGH SENSITIVITY RESULT: 12 NG/L — SIGNIFICANT CHANGE UP
TSH SERPL-MCNC: 6.79 UIU/ML — HIGH (ref 0.27–4.2)
VIT B12 SERPL-MCNC: 996 PG/ML — HIGH (ref 200–900)

## 2022-12-10 PROCEDURE — 99223 1ST HOSP IP/OBS HIGH 75: CPT

## 2022-12-10 PROCEDURE — 12345: CPT | Mod: NC

## 2022-12-10 RX ORDER — ACETAMINOPHEN 500 MG
325 TABLET ORAL EVERY 6 HOURS
Refills: 0 | Status: DISCONTINUED | OUTPATIENT
Start: 2022-12-10 | End: 2022-12-10

## 2022-12-10 RX ORDER — ENOXAPARIN SODIUM 100 MG/ML
40 INJECTION SUBCUTANEOUS EVERY 24 HOURS
Refills: 0 | Status: DISCONTINUED | OUTPATIENT
Start: 2022-12-11 | End: 2022-12-20

## 2022-12-10 RX ORDER — SERTRALINE 25 MG/1
25 TABLET, FILM COATED ORAL DAILY
Refills: 0 | Status: DISCONTINUED | OUTPATIENT
Start: 2022-12-10 | End: 2022-12-20

## 2022-12-10 RX ORDER — SODIUM CHLORIDE 9 MG/ML
1000 INJECTION INTRAMUSCULAR; INTRAVENOUS; SUBCUTANEOUS
Refills: 0 | Status: COMPLETED | OUTPATIENT
Start: 2022-12-10 | End: 2022-12-10

## 2022-12-10 RX ORDER — TAMSULOSIN HYDROCHLORIDE 0.4 MG/1
0.4 CAPSULE ORAL AT BEDTIME
Refills: 0 | Status: DISCONTINUED | OUTPATIENT
Start: 2022-12-10 | End: 2022-12-20

## 2022-12-10 RX ORDER — PANTOPRAZOLE SODIUM 20 MG/1
40 TABLET, DELAYED RELEASE ORAL
Refills: 0 | Status: DISCONTINUED | OUTPATIENT
Start: 2022-12-10 | End: 2022-12-20

## 2022-12-10 RX ORDER — SODIUM CHLORIDE 9 MG/ML
500 INJECTION, SOLUTION INTRAVENOUS ONCE
Refills: 0 | Status: COMPLETED | OUTPATIENT
Start: 2022-12-10 | End: 2022-12-10

## 2022-12-10 RX ORDER — HEPARIN SODIUM 5000 [USP'U]/ML
5000 INJECTION INTRAVENOUS; SUBCUTANEOUS EVERY 12 HOURS
Refills: 0 | Status: DISCONTINUED | OUTPATIENT
Start: 2022-12-10 | End: 2022-12-10

## 2022-12-10 RX ORDER — CALCIUM GLUCONATE 100 MG/ML
1 VIAL (ML) INTRAVENOUS ONCE
Refills: 0 | Status: COMPLETED | OUTPATIENT
Start: 2022-12-10 | End: 2022-12-10

## 2022-12-10 RX ORDER — SODIUM CHLORIDE 9 MG/ML
1000 INJECTION INTRAMUSCULAR; INTRAVENOUS; SUBCUTANEOUS ONCE
Refills: 0 | Status: COMPLETED | OUTPATIENT
Start: 2022-12-10 | End: 2022-12-10

## 2022-12-10 RX ORDER — POTASSIUM PHOSPHATE, MONOBASIC POTASSIUM PHOSPHATE, DIBASIC 236; 224 MG/ML; MG/ML
15 INJECTION, SOLUTION INTRAVENOUS ONCE
Refills: 0 | Status: COMPLETED | OUTPATIENT
Start: 2022-12-10 | End: 2022-12-10

## 2022-12-10 RX ORDER — GABAPENTIN 400 MG/1
300 CAPSULE ORAL DAILY
Refills: 0 | Status: DISCONTINUED | OUTPATIENT
Start: 2022-12-10 | End: 2022-12-20

## 2022-12-10 RX ORDER — ASPIRIN/CALCIUM CARB/MAGNESIUM 324 MG
81 TABLET ORAL DAILY
Refills: 0 | Status: DISCONTINUED | OUTPATIENT
Start: 2022-12-10 | End: 2022-12-16

## 2022-12-10 RX ORDER — LIPASE/PROTEASE/AMYLASE 16-48-48K
1 CAPSULE,DELAYED RELEASE (ENTERIC COATED) ORAL
Refills: 0 | Status: DISCONTINUED | OUTPATIENT
Start: 2022-12-10 | End: 2022-12-20

## 2022-12-10 RX ORDER — ATORVASTATIN CALCIUM 80 MG/1
80 TABLET, FILM COATED ORAL AT BEDTIME
Refills: 0 | Status: DISCONTINUED | OUTPATIENT
Start: 2022-12-10 | End: 2022-12-20

## 2022-12-10 RX ORDER — ACETAMINOPHEN 500 MG
650 TABLET ORAL EVERY 6 HOURS
Refills: 0 | Status: DISCONTINUED | OUTPATIENT
Start: 2022-12-10 | End: 2022-12-20

## 2022-12-10 RX ADMIN — ATORVASTATIN CALCIUM 80 MILLIGRAM(S): 80 TABLET, FILM COATED ORAL at 21:11

## 2022-12-10 RX ADMIN — SODIUM CHLORIDE 75 MILLILITER(S): 9 INJECTION INTRAMUSCULAR; INTRAVENOUS; SUBCUTANEOUS at 07:34

## 2022-12-10 RX ADMIN — POTASSIUM PHOSPHATE, MONOBASIC POTASSIUM PHOSPHATE, DIBASIC 62.5 MILLIMOLE(S): 236; 224 INJECTION, SOLUTION INTRAVENOUS at 07:34

## 2022-12-10 RX ADMIN — GABAPENTIN 300 MILLIGRAM(S): 400 CAPSULE ORAL at 14:30

## 2022-12-10 RX ADMIN — SODIUM CHLORIDE 500 MILLILITER(S): 9 INJECTION, SOLUTION INTRAVENOUS at 22:10

## 2022-12-10 RX ADMIN — SERTRALINE 25 MILLIGRAM(S): 25 TABLET, FILM COATED ORAL at 14:31

## 2022-12-10 RX ADMIN — SODIUM CHLORIDE 500 MILLILITER(S): 9 INJECTION INTRAMUSCULAR; INTRAVENOUS; SUBCUTANEOUS at 05:10

## 2022-12-10 RX ADMIN — SODIUM CHLORIDE 75 MILLILITER(S): 9 INJECTION INTRAMUSCULAR; INTRAVENOUS; SUBCUTANEOUS at 23:20

## 2022-12-10 RX ADMIN — TAMSULOSIN HYDROCHLORIDE 0.4 MILLIGRAM(S): 0.4 CAPSULE ORAL at 21:11

## 2022-12-10 RX ADMIN — Medication 100 GRAM(S): at 07:00

## 2022-12-10 RX ADMIN — ENOXAPARIN SODIUM 40 MILLIGRAM(S): 100 INJECTION SUBCUTANEOUS at 23:26

## 2022-12-10 NOTE — H&P ADULT - NSHPREVIEWOFSYSTEMS_GEN_ALL_CORE
REVIEW OF SYSTEMS:    CONSTITUTIONAL: No weakness, fever, chills or sweating  EYES/ENT: No visual changes.  No dysphagia  NECK: No pain or stiffness  RESPIRATORY: No cough or hemoptysis.  No shortness of breath  CARDIOVASCULAR: No chest pain or palpitations.  No lower extremity edema  GASTROINTESTINAL: No epigastric or abdominal pain. No nausea, vomiting or hematemesis.  No diarrhea or constipation. No melena or hematochezia.  GENITOURINARY: No dysuria, frequency or hematuria  MUSCULOSKELETAL: No joint pain, swelling, decreased ROM, erythema, warmth  NEUROLOGICAL: No numbness or weakness  PSYCHIATRY: No anxiety, or depression.  SKIN: No itching, burning, rashes, or lesions   All other review of systems is negative unless indicated above. REVIEW OF SYSTEMS:    CONSTITUTIONAL: Generalized weakness. Dizziness.  No fever, chills or sweating  EYES/ENT: No visual changes.  No dysphagia  NECK: No pain or stiffness  RESPIRATORY: No cough or hemoptysis.  No shortness of breath  CARDIOVASCULAR: No chest pain or palpitations.  No lower extremity edema  GASTROINTESTINAL: No epigastric or abdominal pain. No nausea, vomiting or hematemesis.  No diarrhea or constipation. No melena or hematochezia.  GENITOURINARY: Hesitancy.  No dysuria, frequency or hematuria  MUSCULOSKELETAL: Weakness with falls.  Uses cane/walker.  No swelling, decreased ROM, erythema, warmth  NEUROLOGICAL: No numbness or weakness  PSYCHIATRY: No anxiety, or depression.  SKIN: No itching, burning, rashes, or lesions   All other review of systems is negative unless indicated above.

## 2022-12-10 NOTE — PROGRESS NOTE ADULT - PROBLEM SELECTOR PLAN 1
- presented with reports of L-sided weakness and is s/p Stroke Code in the ED  - patient reports dizziness, including at rest, frequent falls - associated with head trauma as well  - passed dysphagia screen, diet ordered   - continue neurochecks, monitor tele   - HOB elevation.  Aspiration precautions.  Fall precautions  - neurology following, appreciate recommendations   [ ] f/u TTE with Bubble Study   [ ] f/u MR brain/neck w/o IV contrast   - PT/OT/ SLP

## 2022-12-10 NOTE — H&P ADULT - NSHPSOCIALHISTORY_GEN_ALL_CORE
SOCIAL HISTORY:    Marital Status:  (  )    (  ) Single        (  )        (  )        (  )   Lives with:        (  ) Alone       (  ) Spouse      (  ) Children        (  ) Parents           (  ) Other  Occupation:     Remote history of smoking  No history of alcohol abuse  No history of illegal drug use

## 2022-12-10 NOTE — H&P ADULT - NSHPPHYSICALEXAM_GEN_ALL_CORE
Vital Signs Last 24 Hrs  T(C): 36.4 (10 Dec 2022 04:11), Max: 37 (10 Dec 2022 00:36)  T(F): 97.5 (10 Dec 2022 04:11), Max: 98.6 (10 Dec 2022 00:36)  HR: 52 (10 Dec 2022 04:11) (52 - 61)  BP: 114/68 (10 Dec 2022 04:11) (108/71 - 124/75)  BP(mean): --  RR: 17 (10 Dec 2022 04:11) (16 - 20)  SpO2: 97% (10 Dec 2022 04:11) (97% - 100%)    Parameters below as of 10 Dec 2022 04:11  Patient On (Oxygen Delivery Method): room air    ============================================================== Vital Signs Last 24 Hrs  T(C): 36.4 (10 Dec 2022 04:11), Max: 37 (10 Dec 2022 00:36)  T(F): 97.5 (10 Dec 2022 04:11), Max: 98.6 (10 Dec 2022 00:36)  HR: 52 (10 Dec 2022 04:11) (52 - 61)  BP: 114/68 (10 Dec 2022 04:11) (108/71 - 124/75)  BP(mean): --  RR: 17 (10 Dec 2022 04:11) (16 - 20)  SpO2: 97% (10 Dec 2022 04:11) (97% - 100%)    Parameters below as of 10 Dec 2022 04:11  Patient On (Oxygen Delivery Method): room air    ==============================================================    PHYSICAL EXAMINATION:    APPEARANCE: Fatigued/weak/frail appearing male, lying propped up in stretcher; keeps eyes closed (only opened for very brief period ~ 1 second initially).  Adequately groomed, malnourished appearing.  NAD	  HEENT: Bitemporal wasting.  Dry lips and oral mucosa appears dry.  LYMPHATIC: No lymphadenopathy appreciated  CARDIOVASCULAR: (+) S1 S2.  No JVD.  No murmurs.  No edema  RESPIRATORY: No wheezing, rhonchi, crackles appreciated  GASTROINTESTINAL:  Soft, Non-tender, (+) BS  GENITOURINARY: No suprapubic tenderness.  No CVA tenderness B/L  EXTREMITIES: No active ROM during interview; (+) positive passive ROM.  No clubbing, cyanosis or edema  MUSCULOSKELETAL: Muscle wasting.  No asymmetry  SKIN: No rashes. No ecchymoses.  No cyanosis  PSYCHIATRIC: A&O x 3.  Flat affect.  Keeps eyes closed, but answers   NEUROLOGICAL: Non-focal, DODGE x 4 against gravity  VASCULAR: Peripheral pulses palpable

## 2022-12-10 NOTE — ED ADULT NURSE REASSESSMENT NOTE - NS ED NURSE REASSESS COMMENT FT1
Break coverage RN. Patient A&O, ambulatory to restroom with assistance, respirations even and unlabored, vitals stable. Awaiting CT results and dispo. Stretcher in lowest position, wheels locked, appropriate side rails in place.
Received report from ELY Chiu. Patient A&Ox4 and ambulatory at baseline. Respirations even and unlabored, no signs/symptoms of acute distress present, and patient denies pain at this time. Patient endorses left sided weakness to upper and lower extremities; pulse/motor/sensory presents to all four extremities. Patient denies headache, dizziness, lightheadedness, and vision changes at this time. Sinus kiki on tele. monitor; patient denies dyspnea, shortness of breath, and chest pain. Patient resting in stretcher with family at bedside, MD aware, will continue to monitor.

## 2022-12-10 NOTE — H&P ADULT - PROBLEM SELECTOR PLAN 5
- [id number] score = 5  - not on AC PTA (likely due to repeated falls, including w/ head trauma)  - ECG as above  - heart rate down to 52 in the ED  - TSH = 6.79; f/u FT3, T4  - f/u electrolytes  - unclear if on rate limiting medications; please f/u Med-Hx Pharmacist for med verification - [id number] score = 5  - not on AC PTA (likely due to repeated falls, including w/ head trauma)  - ECG as above  - heart rate down to 52 in the ED  - TSH = 6.79; f/u FT3, T4  - f/u electrolytes  - unclear if on rate limiting medications; please f/u Med-Hx Pharmacist for med verification - [id number] score = 5  - not on AC PTA (likely due to repeated falls, including w/ head trauma)  - ECG as above  - heart rate down to 52 in the ED  - TSH = 6.79; f/u FT3, T4  - f/u electrolytes  - unclear if on rate limiting medications; please f/u Med-Hx Pharmacist for med verification

## 2022-12-10 NOTE — H&P ADULT - PROBLEM SELECTOR PLAN 7
- patient states taking a long time to initiate urinary stream  - possibly due to BPH; unclear if previously diagnosed  - UA w/o signs of infection  - would likely benefit from Flomax  - f/u bladder scan (ordered) - patient states taking a long time to initiate urinary stream  - possibly due to BPH; unclear if previously diagnosed  - UA w/o signs of infection  - Flomax PTA; continue when cleared for oral intake  - evaluate for signs of urinary retention; would bladder scan if indicated

## 2022-12-10 NOTE — PROGRESS NOTE ADULT - SUBJECTIVE AND OBJECTIVE BOX
Patient is a 87y old  Male who presents with a chief complaint of Generalized weakness (10 Dec 2022 04:58)    Todd Francisco MD   [facility]   Pager [phone]  Reachable on Microsoft Teams     SUBJECTIVE / OVERNIGHT EVENTS:  Patient seen and examined this morning. Patient resting comfrotably.   MEDICATIONS  (STANDING):  atorvastatin 80 milliGRAM(s) Oral at bedtime  gabapentin 300 milliGRAM(s) Oral daily  heparin   Injectable 5000 Unit(s) SubCutaneous every 12 hours  pancrelipase  (CREON 24,000 Lipase Units) 1 Capsule(s) Oral three times a day with meals  pantoprazole    Tablet 40 milliGRAM(s) Oral before breakfast  sertraline 25 milliGRAM(s) Oral daily  tamsulosin 0.4 milliGRAM(s) Oral at bedtime    MEDICATIONS  (PRN):  acetaminophen  Suppository .. 325 milliGRAM(s) Rectal every 6 hours PRN Mild Pain (1 - 3)      Vital Signs Last 24 Hrs  T(C): 36.9 (10 Dec 2022 14:28), Max: 37 (10 Dec 2022 00:36)  T(F): 98.4 (10 Dec 2022 14:28), Max: 98.6 (10 Dec 2022 00:36)  HR: 62 (10 Dec 2022 14:28) (52 - 62)  BP: 103/59 (10 Dec 2022 14:28) (103/59 - 124/75)  BP(mean): --  RR: 17 (10 Dec 2022 14:28) (16 - 20)  SpO2: 100% (10 Dec 2022 14:28) (97% - 100%)  CAPILLARY BLOOD GLUCOSE  187 (09 Dec 2022 19:32)      POCT Blood Glucose.: 187 mg/dL (09 Dec 2022 19:15)    I&O's Summary    10 Dec 2022 07:01  -  10 Dec 2022 17:26  --------------------------------------------------------  IN: 0 mL / OUT: 280 mL / NET: -280 mL        General: NAD, awake and alert  Eyes: conjunctiva clear, nonicteric sclera  HENMT: NCAT, MMM  Neck: Supple, trachea midline   Respiratory: No respiratory distress, CTABL, No rales, rhonchi, wheezing.  Cardiovascular: S1,S2; Regular rate and rhythm; No m/g/r. 2+ peripheral pulses  Gastrointestinal: Soft, Nontender, Nondistended; +BS.   Extremities: No c/c/e; warm to touch  Neurological: Moving all 4 extremities; Sensation to LT grossly in tact.  Skin: No rashes, No erythema   Psych: AAOx3; appropriate mood and affect    LABS:                        13.0   5.23  )-----------( 181      ( 09 Dec 2022 19:40 )             39.7     12-10    139  |  107  |  9   ----------------------------<  96  3.5   |  25  |  0.83    Ca    8.1<L>      10 Dec 2022 05:30  Phos  2.4     12-10  Mg     2.00     12-10    TPro  6.4  /  Alb  3.5  /  TBili  0.6  /  DBili  x   /  AST  32  /  ALT  15  /  AlkPhos  65  12-09    PT/INR - ( 09 Dec 2022 19:40 )   PT: 11.8 sec;   INR: 1.02 ratio         PTT - ( 09 Dec 2022 19:40 )  PTT:40.1 sec      Urinalysis Basic - ( 09 Dec 2022 21:26 )    Color: Colorless / Appearance: Clear / S.036 / pH: x  Gluc: x / Ketone: Negative  / Bili: Negative / Urobili: <2 mg/dL   Blood: x / Protein: Negative / Nitrite: Negative   Leuk Esterase: Negative / RBC: x / WBC x   Sq Epi: x / Non Sq Epi: x / Bacteria: x        RADIOLOGY & ADDITIONAL TESTS:    Imaging Personally Reviewed:    Consultant(s) Notes Reviewed:      Care Discussed with Consultants/Other Providers:   Patient is a 87y old  Male who presents with a chief complaint of Generalized weakness (10 Dec 2022 04:58)    Todd Francisco MD   [facility]   Pager [phone]  Reachable on Microsoft Teams     SUBJECTIVE / OVERNIGHT EVENTS:  Patient seen and examined this morning. Patient resting comfrotably.   MEDICATIONS  (STANDING):  atorvastatin 80 milliGRAM(s) Oral at bedtime  gabapentin 300 milliGRAM(s) Oral daily  heparin   Injectable 5000 Unit(s) SubCutaneous every 12 hours  pancrelipase  (CREON 24,000 Lipase Units) 1 Capsule(s) Oral three times a day with meals  pantoprazole    Tablet 40 milliGRAM(s) Oral before breakfast  sertraline 25 milliGRAM(s) Oral daily  tamsulosin 0.4 milliGRAM(s) Oral at bedtime    MEDICATIONS  (PRN):  acetaminophen  Suppository .. 325 milliGRAM(s) Rectal every 6 hours PRN Mild Pain (1 - 3)      Vital Signs Last 24 Hrs  T(C): 36.9 (10 Dec 2022 14:28), Max: 37 (10 Dec 2022 00:36)  T(F): 98.4 (10 Dec 2022 14:28), Max: 98.6 (10 Dec 2022 00:36)  HR: 62 (10 Dec 2022 14:28) (52 - 62)  BP: 103/59 (10 Dec 2022 14:28) (103/59 - 124/75)  BP(mean): --  RR: 17 (10 Dec 2022 14:28) (16 - 20)  SpO2: 100% (10 Dec 2022 14:28) (97% - 100%)  CAPILLARY BLOOD GLUCOSE  187 (09 Dec 2022 19:32)      POCT Blood Glucose.: 187 mg/dL (09 Dec 2022 19:15)    I&O's Summary    10 Dec 2022 07:01  -  10 Dec 2022 17:26  --------------------------------------------------------  IN: 0 mL / OUT: 280 mL / NET: -280 mL        General: NAD, awake and alert  Eyes: conjunctiva clear, nonicteric sclera  HENMT: NCAT, MMM  Neck: Supple, trachea midline   Respiratory: No respiratory distress, CTABL, No rales, rhonchi, wheezing.  Cardiovascular: S1,S2; Regular rate and rhythm; No m/g/r. 2+ peripheral pulses  Gastrointestinal: Soft, Nontender, Nondistended; +BS.   Extremities: No c/c/e; warm to touch  Neurological: Moving all 4 extremities; Sensation to LT grossly in tact.  Skin: No rashes, No erythema   Psych: AAOx3; appropriate mood and affect    LABS:                        13.0   5.23  )-----------( 181      ( 09 Dec 2022 19:40 )             39.7     12-10    139  |  107  |  9   ----------------------------<  96  3.5   |  25  |  0.83    Ca    8.1<L>      10 Dec 2022 05:30  Phos  2.4     12-10  Mg     2.00     12-10    TPro  6.4  /  Alb  3.5  /  TBili  0.6  /  DBili  x   /  AST  32  /  ALT  15  /  AlkPhos  65  12-09    PT/INR - ( 09 Dec 2022 19:40 )   PT: 11.8 sec;   INR: 1.02 ratio         PTT - ( 09 Dec 2022 19:40 )  PTT:40.1 sec      Urinalysis Basic - ( 09 Dec 2022 21:26 )    Color: Colorless / Appearance: Clear / S.036 / pH: x  Gluc: x / Ketone: Negative  / Bili: Negative / Urobili: <2 mg/dL   Blood: x / Protein: Negative / Nitrite: Negative   Leuk Esterase: Negative / RBC: x / WBC x   Sq Epi: x / Non Sq Epi: x / Bacteria: x        RADIOLOGY & ADDITIONAL TESTS:    Imaging Personally Reviewed:    Consultant(s) Notes Reviewed:      Care Discussed with Consultants/Other Providers:   Patient is a 87y old  Male who presents with a chief complaint of Generalized weakness (10 Dec 2022 04:58)    Todd Francisco MD   [facility] Medicine   Pager [phone]  Reachable on Microsoft Teams     SUBJECTIVE / OVERNIGHT EVENTS:  Patient seen and examined this morning. Patient resting comfrotably.   MEDICATIONS  (STANDING):  atorvastatin 80 milliGRAM(s) Oral at bedtime  gabapentin 300 milliGRAM(s) Oral daily  heparin   Injectable 5000 Unit(s) SubCutaneous every 12 hours  pancrelipase  (CREON 24,000 Lipase Units) 1 Capsule(s) Oral three times a day with meals  pantoprazole    Tablet 40 milliGRAM(s) Oral before breakfast  sertraline 25 milliGRAM(s) Oral daily  tamsulosin 0.4 milliGRAM(s) Oral at bedtime    MEDICATIONS  (PRN):  acetaminophen  Suppository .. 325 milliGRAM(s) Rectal every 6 hours PRN Mild Pain (1 - 3)      Vital Signs Last 24 Hrs  T(C): 36.9 (10 Dec 2022 14:28), Max: 37 (10 Dec 2022 00:36)  T(F): 98.4 (10 Dec 2022 14:28), Max: 98.6 (10 Dec 2022 00:36)  HR: 62 (10 Dec 2022 14:28) (52 - 62)  BP: 103/59 (10 Dec 2022 14:28) (103/59 - 124/75)  BP(mean): --  RR: 17 (10 Dec 2022 14:28) (16 - 20)  SpO2: 100% (10 Dec 2022 14:28) (97% - 100%)  CAPILLARY BLOOD GLUCOSE  187 (09 Dec 2022 19:32)      POCT Blood Glucose.: 187 mg/dL (09 Dec 2022 19:15)    I&O's Summary    10 Dec 2022 07:01  -  10 Dec 2022 17:26  --------------------------------------------------------  IN: 0 mL / OUT: 280 mL / NET: -280 mL        General: NAD, awake and alert  Eyes: conjunctiva clear, nonicteric sclera  HENMT: NCAT, MMM  Neck: Supple, trachea midline   Respiratory: No respiratory distress, CTABL, No rales, rhonchi, wheezing.  Cardiovascular: S1,S2; Regular rate and rhythm; No m/g/r. 2+ peripheral pulses  Gastrointestinal: Soft, Nontender, Nondistended; +BS.   Extremities: No c/c/e; warm to touch  Neurological: Moving all 4 extremities; Sensation to LT grossly in tact.  Skin: No rashes, No erythema   Psych: AAOx3; appropriate mood and affect    LABS:                        13.0   5.23  )-----------( 181      ( 09 Dec 2022 19:40 )             39.7     12-10    139  |  107  |  9   ----------------------------<  96  3.5   |  25  |  0.83    Ca    8.1<L>      10 Dec 2022 05:30  Phos  2.4     12-10  Mg     2.00     12-10    TPro  6.4  /  Alb  3.5  /  TBili  0.6  /  DBili  x   /  AST  32  /  ALT  15  /  AlkPhos  65  12-09    PT/INR - ( 09 Dec 2022 19:40 )   PT: 11.8 sec;   INR: 1.02 ratio         PTT - ( 09 Dec 2022 19:40 )  PTT:40.1 sec      Urinalysis Basic - ( 09 Dec 2022 21:26 )    Color: Colorless / Appearance: Clear / S.036 / pH: x  Gluc: x / Ketone: Negative  / Bili: Negative / Urobili: <2 mg/dL   Blood: x / Protein: Negative / Nitrite: Negative   Leuk Esterase: Negative / RBC: x / WBC x   Sq Epi: x / Non Sq Epi: x / Bacteria: x        RADIOLOGY & ADDITIONAL TESTS:    Imaging Personally Reviewed:    Consultant(s) Notes Reviewed:      Care Discussed with Consultants/Other Providers:

## 2022-12-10 NOTE — H&P ADULT - NSHPLABSRESULTS_GEN_ALL_CORE
LAB-WORK/STUDIES:                          13.0   5.23  )-----------( 181      ( 09 Dec 2022 19:40 )             39.7     09 Dec 2022 19:40    135    |  103    |  10     ----------------------------<  191    4.0     |  23     |  0.97     Ca    8.5        09 Dec 2022 19:40    TPro  6.4    /  Alb  3.5    /  TBili  0.6    /  DBili  x      /  AST  32     /  ALT  15     /  AlkPhos  65     09 Dec 2022 19:40    LIVER FUNCTIONS - ( 09 Dec 2022 19:40 )  Alb: 3.5 g/dL / Pro: 6.4 g/dL / ALK PHOS: 65 U/L / ALT: 15 U/L / AST: 32 U/L / GGT: x           PT/INR - ( 09 Dec 2022 19:40 )   PT: 11.8 sec;   INR: 1.02 ratio    PTT - ( 09 Dec 2022 19:40 )  PTT:40.1 sec    CAPILLARY BLOOD GLUCOSE  187 (09 Dec 2022 19:32)    POCT Blood Glucose.: 187 mg/dL (09 Dec 2022 19:15)        Urinalysis Basic - ( 09 Dec 2022 21:26 )    Color: Colorless / Appearance: Clear / S.036 / pH: x  Gluc: x / Ketone: Negative  / Bili: Negative / Urobili: <2 mg/dL   Blood: x / Protein: Negative / Nitrite: Negative   Leuk Esterase: Negative / RBC: x / WBC x   Sq Epi: x / Non Sq Epi: x / Bacteria: x    =======================================================        =======================================================  . LAB-WORK/STUDIES:                          13.0   5.23  )-----------( 181      ( 09 Dec 2022 19:40 )             39.7     09 Dec 2022 19:40    135    |  103    |  10     ----------------------------<  191    4.0     |  23     |  0.97     Ca    8.5        09 Dec 2022 19:40    TPro  6.4    /  Alb  3.5    /  TBili  0.6    /  DBili  x      /  AST  32     /  ALT  15     /  AlkPhos  65     09 Dec 2022 19:40    LIVER FUNCTIONS - ( 09 Dec 2022 19:40 )  Alb: 3.5 g/dL / Pro: 6.4 g/dL / ALK PHOS: 65 U/L / ALT: 15 U/L / AST: 32 U/L / GGT: x           PT/INR - ( 09 Dec 2022 19:40 )   PT: 11.8 sec;   INR: 1.02 ratio    PTT - ( 09 Dec 2022 19:40 )  PTT:40.1 sec    CAPILLARY BLOOD GLUCOSE  187 (09 Dec 2022 19:32)    POCT Blood Glucose.: 187 mg/dL (09 Dec 2022 19:15)        Urinalysis Basic - ( 09 Dec 2022 21:26 )    Color: Colorless / Appearance: Clear / S.036 / pH: x  Gluc: x / Ketone: Negative  / Bili: Negative / Urobili: <2 mg/dL   Blood: x / Protein: Negative / Nitrite: Negative   Leuk Esterase: Negative / RBC: x / WBC x   Sq Epi: x / Non Sq Epi: x / Bacteria: x    =======================================================    ECG = NSR at 61 bpm, QTc = 398    =======================================================  .

## 2022-12-10 NOTE — H&P ADULT - PROBLEM SELECTOR PLAN 4
- phosphorus = 2.4  - supplementation prescribed; please f/u for resolution - phosphorus = 2.4  - likely contributing toward fatigue/weakness  - supplementation prescribed; please f/u for resolution

## 2022-12-10 NOTE — H&P ADULT - PROBLEM SELECTOR PLAN 9
- unable to provide medication list at present  - - unable to provide medication list at present  - Med-Hx Pharmacist e-mailed; please f/u - Medications obtained from family  - please complete Med-Rec/prescribe medications if/when patient cleared for PO (Dysphagia screen ordered)  - need aspirin 81 mg and atorvastatin 80 mg (per Neurology team) when cleared for PO intake

## 2022-12-10 NOTE — H&P ADULT - NSICDXPASTSURGICALHX_GEN_ALL_CORE_FT
PAST SURGICAL HISTORY:  H/O abdominal surgery      PAST SURGICAL HISTORY:  H/O abdominal surgery     Other chemotherapy

## 2022-12-10 NOTE — H&P ADULT - NSHPSOURCEINFOTX_GEN_ALL_CORE
[phone] [facility] [phone] [facility] - [phone]

## 2022-12-10 NOTE — H&P ADULT - PROBLEM SELECTOR PLAN 8
- CT demonstrates "Age indeterminate likely subacute to chronic T12 anterior compression deformity without significant osseous retropulsion or definite surrounding soft tissue edema...."  - no reports of pain prsently - CT demonstrates "Age indeterminate likely subacute to chronic T12 anterior compression deformity without significant osseous retropulsion or definite surrounding soft tissue edema...."  - no reports of pain presently - CT demonstrates "Age indeterminate likely subacute to chronic T12 anterior compression deformity without significant osseous retropulsion or definite surrounding soft tissue edema...."  - no reports of pain presently  - if symptomatic, could start suppository of IV medication until cleared for oral intake (Dysphagia screen ordered) - CT demonstrates "Age indeterminate likely subacute to chronic T12 anterior compression deformity without significant osseous retropulsion or definite surrounding soft tissue edema...."  - no reports of pain presently; patient has very high pain threshold, per daughter, so may not acknowledge the presence of pain  - may be contributing toward patient's difficulty in ambulating  - if symptomatic, could start suppository of IV medication until cleared for oral intake (Dysphagia screen ordered)  - patient has very high pain threshold, per daughter, so may not acknowledge the presence of pain

## 2022-12-10 NOTE — PHYSICAL THERAPY INITIAL EVALUATION ADULT - PERTINENT HX OF CURRENT PROBLEM, REHAB EVAL
87 year old male, with past history significant for HTN, CVA, Meningioma and A-fib, presented to the ED secondary to generalized weakness, and frequent falls.

## 2022-12-10 NOTE — H&P ADULT - NSICDXPASTMEDICALHX_GEN_ALL_CORE_FT
PAST MEDICAL HISTORY:  Atrial fibrillation     Cancer, colon     Cerebrovascular accident (CVA)     Gastric cancer     HTN (hypertension)     Meningioma      PAST MEDICAL HISTORY:  Atrial fibrillation     Back pain     Cancer, colon     Cerebrovascular accident (CVA)     Gastric cancer     HTN (hypertension)     Meningioma     Osteoporosis     Shingles     Stomach cancer

## 2022-12-10 NOTE — PATIENT PROFILE ADULT - FALL HARM RISK - HARM RISK INTERVENTIONS
Assistance OOB with selected safe patient handling equipment/Communicate Risk of Fall with Harm to all staff/Discuss with provider need for PT consult/Monitor gait and stability/Provide patient with walking aids - walker, cane, crutches/Reinforce activity limits and safety measures with patient and family/Tailored Fall Risk Interventions/Visual Cue: Yellow wristband and red socks/Bed in lowest position, wheels locked, appropriate side rails in place/Call bell, personal items and telephone in reach/Instruct patient to call for assistance before getting out of bed or chair/Non-slip footwear when patient is out of bed/[place] to call system/Physically safe environment - no spills, clutter or unnecessary equipment/Purposeful Proactive Rounding/Room/bathroom lighting operational, light cord in reach Assistance OOB with selected safe patient handling equipment/Communicate Risk of Fall with Harm to all staff/Discuss with provider need for PT consult/Monitor gait and stability/Provide patient with walking aids - walker, cane, crutches/Reinforce activity limits and safety measures with patient and family/Tailored Fall Risk Interventions/Visual Cue: Yellow wristband and red socks/Bed in lowest position, wheels locked, appropriate side rails in place/Call bell, personal items and telephone in reach/Instruct patient to call for assistance before getting out of bed or chair/Non-slip footwear when patient is out of bed/[place] to call system/Physically safe environment - no spills, clutter or unnecessary equipment/Purposeful Proactive Rounding/Room/bathroom lighting operational, light cord in reach Assistance OOB with selected safe patient handling equipment/Communicate Risk of Fall with Harm to all staff/Discuss with provider need for PT consult/Monitor gait and stability/Provide patient with walking aids - walker, cane, crutches/Reinforce activity limits and safety measures with patient and family/Tailored Fall Risk Interventions/Visual Cue: Yellow wristband and red socks/Bed in lowest position, wheels locked, appropriate side rails in place/Call bell, personal items and telephone in reach/Instruct patient to call for assistance before getting out of bed or chair/Non-slip footwear when patient is out of bed/[place] to call system/Physically safe environment - no spills, clutter or unnecessary equipment/Purposeful Proactive Rounding/Room/bathroom lighting operational, light cord in reach Assistance with ambulation/Assistance OOB with selected safe patient handling equipment/Communicate Risk of Fall with Harm to all staff/Discuss with provider need for PT consult/Monitor gait and stability/Provide patient with walking aids - walker, cane, crutches/Reinforce activity limits and safety measures with patient and family/Tailored Fall Risk Interventions/Visual Cue: Yellow wristband and red socks/Bed in lowest position, wheels locked, appropriate side rails in place/Call bell, personal items and telephone in reach/Instruct patient to call for assistance before getting out of bed or chair/Non-slip footwear when patient is out of bed/[place] to call system/Physically safe environment - no spills, clutter or unnecessary equipment/Purposeful Proactive Rounding/Room/bathroom lighting operational, light cord in reach Assistance with ambulation/Assistance OOB with selected safe patient handling equipment/Communicate Risk of Fall with Harm to all staff/Discuss with provider need for PT consult/Monitor gait and stability/Provide patient with walking aids - walker, cane, crutches/Reinforce activity limits and safety measures with patient and family/Tailored Fall Risk Interventions/Visual Cue: Yellow wristband and red socks/Bed in lowest position, wheels locked, appropriate side rails in place/Call bell, personal items and telephone in reach/Instruct patient to call for assistance before getting out of bed or chair/Non-slip footwear when patient is out of bed/[place] to call system/Physically safe environment - no spills, clutter or unnecessary equipment/Purposeful Proactive Rounding/Room/bathroom lighting operational, light cord in reach Assistance with ambulation/Assistance OOB with selected safe patient handling equipment/Communicate Risk of Fall with Harm to all staff/Discuss with provider need for PT consult/Monitor gait and stability/Provide patient with walking aids - walker, cane, crutches/Reinforce activity limits and safety measures with patient and family/Tailored Fall Risk Interventions/Visual Cue: Yellow wristband and red socks/Bed in lowest position, wheels locked, appropriate side rails in place/Call bell, personal items and telephone in reach/Instruct patient to call for assistance before getting out of bed or chair/Non-slip footwear when patient is out of bed/[place] to call system/Physically safe environment - no spills, clutter or unnecessary equipment/Purposeful Proactive Rounding/Room/bathroom lighting operational, light cord in reach

## 2022-12-10 NOTE — H&P ADULT - ASSESSMENT
[ x ]  Lab studies personally reviewed  [ x ]  Radiology personally reviewed  [ x ]  Old records personally reviewed; ambulance report    87 year old male, with past history significant for HTN, CVA, Meningioma and A-fib, presented to the ED secondary to generalized weakness, and frequent falls.  Seen and evaluated at bedside; weak appearing.  NAD.  Patient endorses ill feeling    Vital signs upon ED presentation as follows: BP = 121/59, HR = 57, RR = 16, T = 36.7 C (98.1 F), O2 Sat = 100% on RA.  Diagnosed with Weakness in the ED.

## 2022-12-10 NOTE — H&P ADULT - PROBLEM SELECTOR PLAN 3
- heart rate to low of 52 bpm  - ECG = NSR at 61 bpm, QTc = 398.  Troponin = 10  - history of A-fib; ?paroxysmal  - TSH this AM = 6.79 f/u FT3, T4 levels (added to this AM's lab-work)  - continues on Telemetry  - f/u TTE w/ Bubble Study (ordered) - heart rate to low of 52 bpm  - ECG = NSR at 61 bpm, QTc = 398.  Troponin = 10  - history of A-fib; ?paroxysmal  - on metoprolol 25 mg PTA (held, in the setting of NPO)  - TSH this AM = 6.79 f/u FT3, T4 levels (added to this AM's lab-work)  - continues on Telemetry  - f/u TTE w/ Bubble Study (ordered)

## 2022-12-10 NOTE — PHYSICAL THERAPY INITIAL EVALUATION ADULT - GENERAL OBSERVATIONS, REHAB EVAL
Pt received semi-supine on stretcher, +IV, +telemetry, all lines intact, in NAD. Daughter at bedside. Pt agreeable to participate in PT evaluation.

## 2022-12-10 NOTE — H&P ADULT - HISTORY OF PRESENT ILLNESS
87 year old male, with past history significant for HTN, CVA, Meningioma and A-fib, presented to the ED secondary to generalized weakness, and frequent falls.  Seen and evaluated at bedside; weak appearing.  NAD.  Patient endorses     Vital signs upon ED presentation as follows: BP = 121/59, HR = 57, RR = 16, T = 36.7 C (98.1 F), O2 Sat = 100% on RA.  Diagnosed with Weakness in the ED. 87 year old male, with past history significant for HTN, CVA, Meningioma and A-fib, presented to the ED secondary to generalized weakness, and frequent falls.  Seen and evaluated at bedside; appears very weak and keeps his eyes closed.  NAD.  Patient endorses feeling "very sick' and weak.  Reports loss of consciousness sometimes, in the setting of feeling unwell and weak.  Has not been eating and drinking as much as before.  Uses cane/walker to move around.  Tends to feel like he is about to fall during ambulation and suffers with dizziness.  Has had repeated falls and had had trauma during fall approximately one month ago.  States experiencing dizziness at the time of interview while lying motionless in bed.  No report of chest pain, palpitations, fever, chills.    With regard to urination, patient states that it takes time to go; no associated burning or itching.  Bowel movements are regular and normal.    Vital signs upon ED presentation as follows: BP = 121/59, HR = 57, RR = 16, T = 36.7 C (98.1 F), O2 Sat = 100% on RA.  Diagnosed with Weakness in the ED. 87 year old male, with past history significant for HTN, CVA, Meningioma and A-fib, presented to the ED secondary to generalized weakness, and frequent falls.  Seen and evaluated at bedside; appears very weak and keeps his eyes closed.  NAD.  Patient endorses feeling "very sick' and weak.  Reports loss of consciousness sometimes, in the setting of feeling unwell and weak.  Has not been eating and drinking as much as before.  Uses cane/walker to move around.  Tends to feel like he is about to fall during ambulation and suffers with dizziness.  Has had repeated falls and had had trauma during fall approximately one month ago.  States experiencing dizziness at the time of interview while lying motionless in bed.  No report of chest pain, palpitations, fever, chills.    With regard to urination, patient states that it takes time to go; no associated burning or itching.  Bowel movements are regular and normal.    Patient was s/p Stroke Code in the ED due to report of left sided weakness.    Vital signs upon ED presentation as follows: BP = 121/59, HR = 57, RR = 16, T = 36.7 C (98.1 F), O2 Sat = 100% on RA.  Diagnosed with Weakness in the ED. 87 year old male, with past history significant for HTN, CVA, Meningioma and A-fib, presented to the ED secondary to generalized weakness, and frequent falls.  Seen and evaluated at bedside; appears very weak and keeps his eyes closed.  NAD.  Patient endorses feeling "very sick' and weak.  States that his "overcall condition is not good."  Reports loss of consciousness sometimes, in the setting of feeling unwell and weak.  Has not been eating and drinking as much as before.  Uses cane/walker to move around.  Tends to feel like he is about to fall during ambulation and suffers with dizziness.  Has had repeated falls and had trauma during fall approximately one month ago.  States experiencing dizziness at the time of interview while lying motionless in bed.  Family reports patient was at [facility] recently after falling and was diagnosed with meningioma.  Has not yet done outpatient MRI.  No report of chest pain, palpitations, fever, chills.    With regard to urination, patient states that it takes time to go; no associated burning or itching.  Bowel movements are regular and normal.    Patient was s/p Stroke Code in the ED due to report of left sided weakness.    Vital signs upon ED presentation as follows: BP = 121/59, HR = 57, RR = 16, T = 36.7 C (98.1 F), O2 Sat = 100% on RA.  Diagnosed with Weakness in the ED. 87 year old male, with past history significant for HTN, CVA, Meningioma and A-fib, presented to the ED secondary to generalized weakness, and frequent falls.  Seen and evaluated at bedside; appears very weak and keeps his eyes closed.  NAD.  Patient endorses feeling "very sick' and weak.  States that his "overcall condition is not good."  Reports loss of consciousness sometimes, in the setting of feeling unwell and weak.  Has not been eating and drinking as much as before.  Uses cane/walker to move around.  Tends to feel like he is about to fall during ambulation and suffers with dizziness.  Has had repeated falls and had trauma during fall approximately one month ago.  States experiencing dizziness at the time of interview while lying motionless in bed.  Family reports patient was at [facility] recently after falling and was diagnosed with meningioma.  Has not yet done outpatient MRI.  No report of chest pain, palpitations, fever, chills.    With regard to urination, patient states that it takes time to go; no associated burning or itching.  Bowel movements are regular and normal.    Patient was s/p Stroke Code in the ED due to report of left sided weakness.    Vital signs upon ED presentation as follows: BP = 121/59, HR = 57, RR = 16, T = 36.7 C (98.1 F), O2 Sat = 100% on RA.  Diagnosed with Weakness in the ED. 87 year old male, with past history significant for HTN, CVA, Meningioma and A-fib, presented to the ED secondary to generalized weakness, and frequent falls.  Seen and evaluated at bedside; appears very weak and keeps his eyes closed.  NAD.  Patient endorses feeling "very sick' and weak.  States that his "overcall condition is not good."  Reports loss of consciousness sometimes, in the setting of feeling unwell and weak.  Has not been eating and drinking as much as before.  Uses cane/walker to move around.  Tends to feel like he is about to fall during ambulation and suffers with dizziness.  Has had repeated falls and had trauma during fall approximately one month ago.  States experiencing dizziness at the time of interview while lying motionless in bed.  Family reports patient was at [facility] recently after falling and was diagnosed with meningioma.  Has not yet done outpatient MRI.  No report of chest pain, palpitations, fever, chills.    With regard to urination, patient states that it takes time to go; no associated burning or itching.  Bowel movements are regular and normal.    Patient was s/p Stroke Code in the ED due to report of left sided weakness.    Vital signs upon ED presentation as follows: BP = 121/59, HR = 57, RR = 16, T = 36.7 C (98.1 F), O2 Sat = 100% on RA.  Diagnosed with Weakness in the ED. 87 year old male, with past history significant for HTN, CVA, Meningioma and A-fib, presented to the ED secondary to generalized weakness, and frequent falls.  Seen and evaluated at bedside; appears very weak and keeps his eyes closed.  NAD.  Patient endorses feeling "very sick' and weak.  States that his "overall condition is not good."  Reports loss of consciousness sometimes, in the setting of feeling unwell and weak.  Has not been eating and drinking as much as before.  Uses cane/walker to move around.  Tends to feel like he is about to fall during ambulation and suffers with dizziness.  Has had repeated falls and had trauma during fall approximately one month ago.  States experiencing dizziness at the time of interview while lying motionless in bed.  Family reports patient was at [facility] recently after falling and was diagnosed with meningioma.  Has not yet done outpatient MRI.  No report of chest pain, palpitations, fever, chills.    With regard to urination, patient states that it takes time to go; no associated burning or itching.  Bowel movements are regular and normal.    Patient was s/p Stroke Code in the ED due to report of left sided weakness.    Vital signs upon ED presentation as follows: BP = 121/59, HR = 57, RR = 16, T = 36.7 C (98.1 F), O2 Sat = 100% on RA.  Diagnosed with Weakness in the ED. 87 year old male, with past history significant for HTN, CVA, Meningioma and A-fib, presented to the ED secondary to generalized weakness, and frequent falls.  Seen and evaluated at bedside; appears very weak and keeps his eyes closed.  NAD.  Patient endorses feeling "very sick' and weak.  States that his "overall condition is not good."  Reports loss of consciousness sometimes, in the setting of feeling unwell and weak.  Has not been eating and drinking as much as before.  Uses cane/walker to move around.  Tends to feel like he is about to fall during ambulation and suffers with dizziness.  Has had repeated falls and had trauma during fall approximately one month ago.  States experiencing dizziness at the time of interview while lying motionless in bed.  Family reports patient was at [facility] recently after falling and was diagnosed with meningioma.  Has not yet done outpatient MRI.  No report of chest pain, palpitations, fever, chills.    With regard to urination, patient states that it takes time to go; no associated burning or itching.  Bowel movements are regular and normal.    Patient was s/p Stroke Code in the ED due to report of left sided weakness.    Vital signs upon ED presentation as follows: BP = 121/59, HR = 57, RR = 16, T = 36.7 C (98.1 F), O2 Sat = 100% on RA.  Diagnosed with Weakness in the ED. 87 year old male, with past history significant for HTN, CVA, Meningioma and A-fib, presented to the ED secondary to generalized weakness, and frequent falls.  Seen and evaluated at bedside; appears very weak and keeps his eyes closed.  NAD.  Patient endorses feeling "very sick' and weak.  States that his "overall condition is not good."  Reports loss of consciousness sometimes, in the setting of feeling unwell and weak.  Has not been eating and drinking as much as before.  Uses cane/walker to move around.  Tends to feel like he is about to fall during ambulation and suffers with dizziness.  Has had repeated falls and had trauma during fall approximately one month ago.  States experiencing dizziness at the time of interview while lying motionless in bed.  Family reports patient was at [facility] recently after falling and was diagnosed with meningioma.  Has not yet done outpatient MRI.  No report of chest pain, palpitations, fever, chills.    With regard to urination, patient states that it takes time to go; no associated burning or itching.  Bowel movements are regular and normal.    Patient was s/p Stroke Code in the ED due to report of left sided weakness.    Vital signs upon ED presentation as follows: BP = 121/59, HR = 57, RR = 16, T = 36.7 C (98.1 F), O2 Sat = 100% on RA.  Diagnosed with Weakness in the ED. 87 year old male, with past history significant for HTN, CVA, Meningioma and A-fib, presented to the ED secondary to generalized weakness, and frequent falls.  Seen and evaluated at bedside; appears very weak and keeps his eyes closed.  NAD.  Patient endorses feeling "very sick' and weak.  States that his "overall condition is not good."  Reports loss of consciousness sometimes, in the setting of feeling unwell and weak.  Has not been eating and drinking as much as before.  Uses cane/walker to move around.  Tends to feel like he is about to fall during ambulation and suffers with dizziness.  Has had repeated falls and had trauma during fall approximately one month ago.  States experiencing dizziness at the time of interview while lying motionless in bed.  Family reports patient was at [facility] recently after falling and was diagnosed with meningioma.  Has not yet done outpatient MRI.  No report of chest pain, palpitations, fever, chills.    With regard to urination, patient states that it takes time to go; no associated burning or itching.  Bowel movements are regular and normal.    Patient was s/p Stroke Code in the ED due to report of left sided weakness.    Information from Daughter:  - patient suffers with sundowning; personality completely changes in the evenings  - possibility that patient is depressed since his wife is diagnosed with bladder cancer  - has been hallucinating, thinking someone (family) is trying to kill him  - has not been able to stay in his apartment because of falls and becomes agitated - wanting to home (lives between daughters)  - has high pain tolerance so may not complain of pain, even when severe  - has stomach cancer in 2013 and is s/p surgery and chemotherapy    Vital signs upon ED presentation as follows: BP = 121/59, HR = 57, RR = 16, T = 36.7 C (98.1 F), O2 Sat = 100% on RA.  Diagnosed with Weakness in the ED. 87 year old male, with past history significant for HTN, CVA, Meningioma and A-fib, presented to the ED secondary to generalized weakness, and frequent falls.  Seen and evaluated at bedside; appears very weak and keeps his eyes closed.  NAD.  Patient endorses feeling "very sick' and weak.  States that his "overall condition is not good."  Reports loss of consciousness sometimes, in the setting of feeling unwell and weak.  Has not been eating and drinking as much as before.  Uses cane/walker to move around.  Tends to feel like he is about to fall during ambulation and suffers with dizziness.  Has had repeated falls and had trauma during fall approximately one month ago.  States experiencing dizziness at the time of interview while lying motionless in bed.  Family reports patient was at [facility] recently after falling and was diagnosed with meningioma.  Has not yet done outpatient MRI.  No report of chest pain, palpitations, fever, chills.    With regard to urination, patient states that it takes time to go; no associated burning or itching.  Bowel movements are regular and normal.    Patient was s/p Stroke Code in the ED due to report of left sided weakness.    Information from Daughter:  - patient suffers with sundowning; personality completely changes in the evenings  - possibility that patient is depressed since his wife is diagnosed with bladder cancer  - has been hallucinating, thinking someone (family) is trying to kill him  - has not been able to stay in his apartment because of falls and becomes agitated - wanting to home (lives between daughters)  - has high pain tolerance so may not complain of pain, even when severe  - has stomach cancer in 2013 and is s/p surgery and chemotherapy    Vital signs upon ED presentation as follows: BP = 121/59, HR = 57, RR = 16, T = 36.7 C (98.1 F), O2 Sat = 100% on RA.  Diagnosed with Weakness in the ED. 87 year old male, with past history significant for HTN, CVA, Meningioma and A-fib, presented to the ED secondary to generalized weakness, and frequent falls.  Seen and evaluated at bedside; appears very weak and keeps his eyes closed.  NAD.  Patient endorses feeling "very sick' and weak.  States that his "overall condition is not good."  Reports loss of consciousness sometimes, in the setting of feeling unwell and weak.  Has not been eating and drinking as much as before.  Uses cane/walker to move around.  Tends to feel like he is about to fall during ambulation and suffers with dizziness.  Has had repeated falls and had trauma during fall approximately one month ago.  States experiencing dizziness at the time of interview while lying motionless in bed.  Family reports patient was at [facility] recently after falling and was diagnosed with meningioma.  Has not yet done outpatient MRI.  No report of chest pain, palpitations, fever, chills.    With regard to urination, patient states that it takes time to go; no associated burning or itching.  Bowel movements are regular and normal.    Patient was s/p Stroke Code in the ED due to report of left sided weakness.    Information from Daughter:  - patient suffers with sundowning; personality completely changes in the evenings  - possibility that patient is depressed since his wife is diagnosed with bladder cancer  - has been hallucinating, thinking someone (family) is trying to kill him  - has not been able to stay in his apartment because of falls and becomes agitated - wanting to home (lives between daughters)  - has high pain tolerance so may not complain of pain, even when severe  - has stomach cancer in 2013 and is s/p surgery and chemotherapy    Vital signs upon ED presentation as follows: BP = 121/59, HR = 57, RR = 16, T = 36.7 C (98.1 F), O2 Sat = 100% on RA.  Diagnosed with Weakness in the ED.

## 2022-12-10 NOTE — PROGRESS NOTE ADULT - PROBLEM SELECTOR PLAN 3
- phosphorus = 2.4  - likely contributing toward fatigue/weakness  - supplementation prescribed; please f/u for resolution

## 2022-12-10 NOTE — PROGRESS NOTE ADULT - PROBLEM SELECTOR PLAN 4
- [id number] score = 5  - not on AC PTA (due to repeated falls, including w/ head trauma)  - holding home BB - [id number] score = 5  - not on AC PTA (due to repeated falls, including w/ head trauma)  - holding home BB - [id number] score = 5  - not on AC PTA (due to repeated falls, including w/ head trauma)  - holding home BB

## 2022-12-10 NOTE — PROGRESS NOTE ADULT - ASSESSMENT
87 year old male, with past history significant for HTN, CVA, Meningioma and A-fib, presented to the ED secondary to generalized weakness, and frequent falls. Code Stroke was called for left sided weakness. CT head, CTA H&N, showed patient's known meningioma with no other acute intracranial abnormality and no LVO.

## 2022-12-10 NOTE — H&P ADULT - PROBLEM SELECTOR PLAN 1
- presented with reports of L-sided weakness and is s/p Stroke Code in the ED  - patient reports dizziness, including at rest, frequent falls - associated with head trauma as well  - seen by Stroke/Neurology team (appreciated)  - continues on Telemetry  - neurological checks  - f/u dysphagia screen (ordered)  - ECG = NSR at 61 bpm, QTc = 398.  Troponin = 10 - presented with reports of L-sided weakness and is s/p Stroke Code in the ED  - patient reports dizziness, including at rest, frequent falls - associated with head trauma as well  - seen by Stroke/Neurology team (appreciated)  - continues on Telemetry  - neurological checks  - f/u dysphagia screen (ordered)  - ECG = NSR at 61 bpm, QTc = 398.  Troponin = 10  - HOB elevation.  Aspiration precautions.  Fall precautions  - f/u TTE with Bubble Study (ordered)  - Physical therapy eval (ordered)  - Occupational therapy eval - presented with reports of L-sided weakness and is s/p Stroke Code in the ED  - patient reports dizziness, including at rest, frequent falls - associated with head trauma as well  - seen by Stroke/Neurology team (appreciated); NIHSS = 5  - continues on Telemetry  - neurological checks  - f/u dysphagia screen (ordered)  - ECG = NSR at 61 bpm, QTc = 398.  Troponin = 10  - HOB elevation.  Aspiration precautions.  Fall precautions  - f/u TTE with Bubble Study (ordered)  - f/u MR brain/neck w/o IV contrast (ordered); family (daughter - Sangmi - states on metal present in patient's body  - Physical therapy eval (ordered)  - Occupational therapy eval - presented with reports of L-sided weakness and is s/p Stroke Code in the ED  - patient reports dizziness, including at rest, frequent falls - associated with head trauma as well  - report of sundowning recently, as well as change in mentation/hallucinations (per daughter)  - seen by Stroke/Neurology team (appreciated); NIHSS = 5  - continues on Telemetry  - neurological checks  - f/u dysphagia screen (ordered)  - ECG = NSR at 61 bpm, QTc = 398.  Troponin = 10  - HOB elevation.  Aspiration precautions.  Fall precautions  - f/u TTE with Bubble Study (ordered)  - f/u MR brain/neck w/o IV contrast (ordered); family (daughter - Sangmi - states on metal present in patient's body  - Physical therapy eval (ordered)  - Occupational therapy eval

## 2022-12-10 NOTE — H&P ADULT - PROBLEM SELECTOR PLAN 6
- TSH = 6.79  - possibly the cause of bradycardia  - f/u FT3, T4 to determine if need to initiate levothyroxine

## 2022-12-10 NOTE — PHYSICAL THERAPY INITIAL EVALUATION ADULT - ADDITIONAL COMMENTS
Pt lives in an apartment with wife, +12 steps to negotiate, elevator access inside however is broken per daughter. Pt was ambulating with assistance using a Single Axis Cane and requires assistance for ADLs.    Pt left semi-supine on stretcher, all lines intact, all needs in reach, rail up, in NAD. ELY Jimenez aware.

## 2022-12-10 NOTE — H&P ADULT - PROBLEM SELECTOR PLAN 2
- generalized weakness, with dizziness (including at rest), difficulty ambulating, falls with head trauma  - unclear etiology, but likely multifactorial: sequela of suspected CVA vs worsening of dementia vs decreased oral intake vs dehydration vs electrolyte abnormalities vs vitamin deficiencies  - no gross signs of infection appreciated  - CT of brain demonstrates "Mild volume loss, microvascular disease, no acute hemorrhage or midline shift.  Left frontal probable meningioma 1.6 cm x 0.9 cm..."  - MCV = 100; vitamin B12-level = 996, folate = 18.7.  TSH = 6.79  - f/u Free T3, T4 levels (added to this AM's lab-work)  - f/u TTE w/ Bubble Study (ordered)  - Physical therapy eval (ordered)  - Occupational therapy eval

## 2022-12-11 DIAGNOSIS — E43 UNSPECIFIED SEVERE PROTEIN-CALORIE MALNUTRITION: ICD-10-CM

## 2022-12-11 LAB
ANION GAP SERPL CALC-SCNC: 10 MMOL/L — SIGNIFICANT CHANGE UP (ref 7–14)
BUN SERPL-MCNC: 8 MG/DL — SIGNIFICANT CHANGE UP (ref 7–23)
CALCIUM SERPL-MCNC: 8.1 MG/DL — LOW (ref 8.4–10.5)
CHLORIDE SERPL-SCNC: 108 MMOL/L — HIGH (ref 98–107)
CHOLEST SERPL-MCNC: 141 MG/DL — SIGNIFICANT CHANGE UP
CO2 SERPL-SCNC: 22 MMOL/L — SIGNIFICANT CHANGE UP (ref 22–31)
CREAT SERPL-MCNC: 0.89 MG/DL — SIGNIFICANT CHANGE UP (ref 0.5–1.3)
CULTURE RESULTS: SIGNIFICANT CHANGE UP
EGFR: 83 ML/MIN/1.73M2 — SIGNIFICANT CHANGE UP
GLUCOSE SERPL-MCNC: 84 MG/DL — SIGNIFICANT CHANGE UP (ref 70–99)
HDLC SERPL-MCNC: 42 MG/DL — SIGNIFICANT CHANGE UP
LIPID PNL WITH DIRECT LDL SERPL: 81 MG/DL — SIGNIFICANT CHANGE UP
NON HDL CHOLESTEROL: 99 MG/DL — SIGNIFICANT CHANGE UP
POTASSIUM SERPL-MCNC: 3.7 MMOL/L — SIGNIFICANT CHANGE UP (ref 3.5–5.3)
POTASSIUM SERPL-SCNC: 3.7 MMOL/L — SIGNIFICANT CHANGE UP (ref 3.5–5.3)
SODIUM SERPL-SCNC: 140 MMOL/L — SIGNIFICANT CHANGE UP (ref 135–145)
SPECIMEN SOURCE: SIGNIFICANT CHANGE UP
T4 FREE+ TSH PNL SERPL: 3.45 UIU/ML — SIGNIFICANT CHANGE UP (ref 0.27–4.2)
TRIGL SERPL-MCNC: 90 MG/DL — SIGNIFICANT CHANGE UP

## 2022-12-11 PROCEDURE — 99233 SBSQ HOSP IP/OBS HIGH 50: CPT

## 2022-12-11 RX ADMIN — Medication 1 CAPSULE(S): at 18:05

## 2022-12-11 RX ADMIN — PANTOPRAZOLE SODIUM 40 MILLIGRAM(S): 20 TABLET, DELAYED RELEASE ORAL at 05:51

## 2022-12-11 RX ADMIN — GABAPENTIN 300 MILLIGRAM(S): 400 CAPSULE ORAL at 13:17

## 2022-12-11 RX ADMIN — Medication 81 MILLIGRAM(S): at 13:17

## 2022-12-11 RX ADMIN — Medication 1 CAPSULE(S): at 13:18

## 2022-12-11 RX ADMIN — ENOXAPARIN SODIUM 40 MILLIGRAM(S): 100 INJECTION SUBCUTANEOUS at 22:49

## 2022-12-11 RX ADMIN — TAMSULOSIN HYDROCHLORIDE 0.4 MILLIGRAM(S): 0.4 CAPSULE ORAL at 22:48

## 2022-12-11 RX ADMIN — Medication 1 CAPSULE(S): at 09:13

## 2022-12-11 RX ADMIN — SERTRALINE 25 MILLIGRAM(S): 25 TABLET, FILM COATED ORAL at 13:17

## 2022-12-11 RX ADMIN — ATORVASTATIN CALCIUM 80 MILLIGRAM(S): 80 TABLET, FILM COATED ORAL at 22:48

## 2022-12-11 NOTE — PROGRESS NOTE ADULT - SUBJECTIVE AND OBJECTIVE BOX
Patient is a 87y old  Male who presents with a chief complaint of Generalized weakness (10 Dec 2022 17:26)    Todd Francisco MD   [facility]   Pager [phone]  Reachable on [employer] Teams     SUBJECTIVE / OVERNIGHT EVENTS:  Patient seen and examined this morning with Bulgarian  #[id number].  He states that weakness is resolved. Has been feeling well.     MEDICATIONS  (STANDING):  aspirin  chewable 81 milliGRAM(s) Oral daily  atorvastatin 80 milliGRAM(s) Oral at bedtime  enoxaparin Injectable 40 milliGRAM(s) SubCutaneous every 24 hours  gabapentin 300 milliGRAM(s) Oral daily  pancrelipase  (CREON 24,000 Lipase Units) 1 Capsule(s) Oral three times a day with meals  pantoprazole    Tablet 40 milliGRAM(s) Oral before breakfast  sertraline 25 milliGRAM(s) Oral daily  tamsulosin 0.4 milliGRAM(s) Oral at bedtime    MEDICATIONS  (PRN):  acetaminophen     Tablet .. 650 milliGRAM(s) Oral every 6 hours PRN Mild Pain (1 - 3), Moderate Pain (4 - 6)      Vital Signs Last 24 Hrs  T(C): 36.8 (11 Dec 2022 08:00), Max: 37 (11 Dec 2022 00:00)  T(F): 98.2 (11 Dec 2022 08:00), Max: 98.6 (11 Dec 2022 00:00)  HR: 66 (11 Dec 2022 08:00) (60 - 72)  BP: 116/65 (11 Dec 2022 08:00) (101/63 - 123/63)  BP(mean): --  RR: 18 (11 Dec 2022 08:00) (16 - 18)  SpO2: 99% (11 Dec 2022 08:00) (96% - 100%)  CAPILLARY BLOOD GLUCOSE        I&O's Summary    10 Dec 2022 07:01  -  11 Dec 2022 07:00  --------------------------------------------------------  IN: 240 mL / OUT: 1480 mL / NET: -1240 mL        General: NAD, awake and alert  Eyes: PERRLA, conjunctiva clear, nonicteric sclera  HENMT: MMM  Respiratory: No respiratory distress, CTABL, No rales, rhonchi, wheezing.  Cardiovascular: S1,S2; Regular rate and rhythm; No m/g/r. 2+ peripheral pulses  Gastrointestinal: Soft, Nontender, Nondistended; +BS.   Extremities: No c/c/e; warm to touch  Neurological: Strength: CN Ii-XII intact. 5/5 bilateral handgrip, biceps, quadriceps hamstrings, calf. Sensation to LT grossly in tact in all 4 extremities.  Skin: No rashes, No erythema   Psych: AAOx2 (knows name and that he is in hospital) appropriate mood and affect      LABS:                        13.0   5.23  )-----------( 181      ( 09 Dec 2022 19:40 )             39.7     12-10    139  |  107  |  9   ----------------------------<  96  3.5   |  25  |  0.83    Ca    8.1<L>      10 Dec 2022 05:30  Phos  2.4     12-10  Mg     2.00     12-10    TPro  6.4  /  Alb  3.5  /  TBili  0.6  /  DBili  x   /  AST  32  /  ALT  15  /  AlkPhos  65  12-09    PT/INR - ( 09 Dec 2022 19:40 )   PT: 11.8 sec;   INR: 1.02 ratio         PTT - ( 09 Dec 2022 19:40 )  PTT:40.1 sec      Urinalysis Basic - ( 09 Dec 2022 21:26 )    Color: Colorless / Appearance: Clear / S.036 / pH: x  Gluc: x / Ketone: Negative  / Bili: Negative / Urobili: <2 mg/dL   Blood: x / Protein: Negative / Nitrite: Negative   Leuk Esterase: Negative / RBC: x / WBC x   Sq Epi: x / Non Sq Epi: x / Bacteria: x        RADIOLOGY & ADDITIONAL TESTS:    Imaging Personally Reviewed:    Consultant(s) Notes Reviewed:  Neuro    Care Discussed with Consultants/Other Providers: ELAINE   Patient is a 87y old  Male who presents with a chief complaint of Generalized weakness (10 Dec 2022 17:26)    Todd Francisco MD   [facility] Medicine   Pager [phone]  Reachable on [employer] Teams     SUBJECTIVE / OVERNIGHT EVENTS:  Patient seen and examined this morning with Kinyarwanda  #[id number].  He states that weakness is resolved. Has been feeling well.     MEDICATIONS  (STANDING):  aspirin  chewable 81 milliGRAM(s) Oral daily  atorvastatin 80 milliGRAM(s) Oral at bedtime  enoxaparin Injectable 40 milliGRAM(s) SubCutaneous every 24 hours  gabapentin 300 milliGRAM(s) Oral daily  pancrelipase  (CREON 24,000 Lipase Units) 1 Capsule(s) Oral three times a day with meals  pantoprazole    Tablet 40 milliGRAM(s) Oral before breakfast  sertraline 25 milliGRAM(s) Oral daily  tamsulosin 0.4 milliGRAM(s) Oral at bedtime    MEDICATIONS  (PRN):  acetaminophen     Tablet .. 650 milliGRAM(s) Oral every 6 hours PRN Mild Pain (1 - 3), Moderate Pain (4 - 6)      Vital Signs Last 24 Hrs  T(C): 36.8 (11 Dec 2022 08:00), Max: 37 (11 Dec 2022 00:00)  T(F): 98.2 (11 Dec 2022 08:00), Max: 98.6 (11 Dec 2022 00:00)  HR: 66 (11 Dec 2022 08:00) (60 - 72)  BP: 116/65 (11 Dec 2022 08:00) (101/63 - 123/63)  BP(mean): --  RR: 18 (11 Dec 2022 08:00) (16 - 18)  SpO2: 99% (11 Dec 2022 08:00) (96% - 100%)  CAPILLARY BLOOD GLUCOSE        I&O's Summary    10 Dec 2022 07:01  -  11 Dec 2022 07:00  --------------------------------------------------------  IN: 240 mL / OUT: 1480 mL / NET: -1240 mL        General: NAD, awake and alert  Eyes: PERRLA, conjunctiva clear, nonicteric sclera  HENMT: MMM  Respiratory: No respiratory distress, CTABL, No rales, rhonchi, wheezing.  Cardiovascular: S1,S2; Regular rate and rhythm; No m/g/r. 2+ peripheral pulses  Gastrointestinal: Soft, Nontender, Nondistended; +BS.   Extremities: No c/c/e; warm to touch  Neurological: Strength: CN Ii-XII intact. 5/5 bilateral handgrip, biceps, quadriceps hamstrings, calf. Sensation to LT grossly in tact in all 4 extremities.  Skin: No rashes, No erythema   Psych: AAOx2 (knows name and that he is in hospital) appropriate mood and affect      LABS:                        13.0   5.23  )-----------( 181      ( 09 Dec 2022 19:40 )             39.7     12-10    139  |  107  |  9   ----------------------------<  96  3.5   |  25  |  0.83    Ca    8.1<L>      10 Dec 2022 05:30  Phos  2.4     12-10  Mg     2.00     12-10    TPro  6.4  /  Alb  3.5  /  TBili  0.6  /  DBili  x   /  AST  32  /  ALT  15  /  AlkPhos  65  12-09    PT/INR - ( 09 Dec 2022 19:40 )   PT: 11.8 sec;   INR: 1.02 ratio         PTT - ( 09 Dec 2022 19:40 )  PTT:40.1 sec      Urinalysis Basic - ( 09 Dec 2022 21:26 )    Color: Colorless / Appearance: Clear / S.036 / pH: x  Gluc: x / Ketone: Negative  / Bili: Negative / Urobili: <2 mg/dL   Blood: x / Protein: Negative / Nitrite: Negative   Leuk Esterase: Negative / RBC: x / WBC x   Sq Epi: x / Non Sq Epi: x / Bacteria: x        RADIOLOGY & ADDITIONAL TESTS:    Imaging Personally Reviewed:    Consultant(s) Notes Reviewed:  Neuro    Care Discussed with Consultants/Other Providers: ELAINE   Patient is a 87y old  Male who presents with a chief complaint of Generalized weakness (10 Dec 2022 17:26)    Todd Francisco MD   [facility]   Pager [phone]  Reachable on [employer] Teams     SUBJECTIVE / OVERNIGHT EVENTS:  Patient seen and examined this morning with Sami  #[id number].  He states that weakness is resolved. Has been feeling well.     MEDICATIONS  (STANDING):  aspirin  chewable 81 milliGRAM(s) Oral daily  atorvastatin 80 milliGRAM(s) Oral at bedtime  enoxaparin Injectable 40 milliGRAM(s) SubCutaneous every 24 hours  gabapentin 300 milliGRAM(s) Oral daily  pancrelipase  (CREON 24,000 Lipase Units) 1 Capsule(s) Oral three times a day with meals  pantoprazole    Tablet 40 milliGRAM(s) Oral before breakfast  sertraline 25 milliGRAM(s) Oral daily  tamsulosin 0.4 milliGRAM(s) Oral at bedtime    MEDICATIONS  (PRN):  acetaminophen     Tablet .. 650 milliGRAM(s) Oral every 6 hours PRN Mild Pain (1 - 3), Moderate Pain (4 - 6)      Vital Signs Last 24 Hrs  T(C): 36.8 (11 Dec 2022 08:00), Max: 37 (11 Dec 2022 00:00)  T(F): 98.2 (11 Dec 2022 08:00), Max: 98.6 (11 Dec 2022 00:00)  HR: 66 (11 Dec 2022 08:00) (60 - 72)  BP: 116/65 (11 Dec 2022 08:00) (101/63 - 123/63)  BP(mean): --  RR: 18 (11 Dec 2022 08:00) (16 - 18)  SpO2: 99% (11 Dec 2022 08:00) (96% - 100%)  CAPILLARY BLOOD GLUCOSE        I&O's Summary    10 Dec 2022 07:01  -  11 Dec 2022 07:00  --------------------------------------------------------  IN: 240 mL / OUT: 1480 mL / NET: -1240 mL        General: NAD, awake and alert  Eyes: PERRLA, conjunctiva clear, nonicteric sclera  HENMT: MMM  Respiratory: No respiratory distress, CTABL, No rales, rhonchi, wheezing.  Cardiovascular: S1,S2; Regular rate and rhythm; No m/g/r. 2+ peripheral pulses  Gastrointestinal: Soft, Nontender, Nondistended; +BS.   Extremities: No c/c/e; warm to touch  Neurological: Strength: CN Ii-XII intact. 5/5 bilateral handgrip, biceps, quadriceps hamstrings, calf. Sensation to LT grossly in tact in all 4 extremities.  Skin: No rashes, No erythema   Psych: AAOx2 (knows name and that he is in hospital) appropriate mood and affect      LABS:                        13.0   5.23  )-----------( 181      ( 09 Dec 2022 19:40 )             39.7     12-10    139  |  107  |  9   ----------------------------<  96  3.5   |  25  |  0.83    Ca    8.1<L>      10 Dec 2022 05:30  Phos  2.4     12-10  Mg     2.00     12-10    TPro  6.4  /  Alb  3.5  /  TBili  0.6  /  DBili  x   /  AST  32  /  ALT  15  /  AlkPhos  65  12-09    PT/INR - ( 09 Dec 2022 19:40 )   PT: 11.8 sec;   INR: 1.02 ratio         PTT - ( 09 Dec 2022 19:40 )  PTT:40.1 sec      Urinalysis Basic - ( 09 Dec 2022 21:26 )    Color: Colorless / Appearance: Clear / S.036 / pH: x  Gluc: x / Ketone: Negative  / Bili: Negative / Urobili: <2 mg/dL   Blood: x / Protein: Negative / Nitrite: Negative   Leuk Esterase: Negative / RBC: x / WBC x   Sq Epi: x / Non Sq Epi: x / Bacteria: x        RADIOLOGY & ADDITIONAL TESTS:    Imaging Personally Reviewed:    Consultant(s) Notes Reviewed:  Neuro    Care Discussed with Consultants/Other Providers: ELAINE

## 2022-12-11 NOTE — PROGRESS NOTE ADULT - PROBLEM SELECTOR PLAN 4
- [id number] score = 5  - not on AC PTA (due to repeated falls, including w/ head trauma)  - holding home BB - [id number] score = 5  - not on AC PTA (due to repeated falls, including w/ head trauma)  - holding home BB - [id number] score = 5  - not on AC PTA (due to repeated falls, including w/ head trauma)  - holding home BB

## 2022-12-11 NOTE — OCCUPATIONAL THERAPY INITIAL EVALUATION ADULT - ADDITIONAL COMMENTS
Per daughter, pt was independent with basic self care but recently had increased number of falls at home.

## 2022-12-11 NOTE — PROGRESS NOTE ADULT - PROBLEM SELECTOR PLAN 6
# Gastric cancer s/p resection   Family reports poor PO intake since gastric surgery several years ago  has been losing weight since then, BMI 18  started ensure BID, nutrition eval

## 2022-12-12 LAB
ANION GAP SERPL CALC-SCNC: 8 MMOL/L — SIGNIFICANT CHANGE UP (ref 7–14)
BASOPHILS # BLD AUTO: 0.04 K/UL — SIGNIFICANT CHANGE UP (ref 0–0.2)
BASOPHILS NFR BLD AUTO: 0.7 % — SIGNIFICANT CHANGE UP (ref 0–2)
BUN SERPL-MCNC: 7 MG/DL — SIGNIFICANT CHANGE UP (ref 7–23)
CALCIUM SERPL-MCNC: 8.4 MG/DL — SIGNIFICANT CHANGE UP (ref 8.4–10.5)
CHLORIDE SERPL-SCNC: 108 MMOL/L — HIGH (ref 98–107)
CO2 SERPL-SCNC: 25 MMOL/L — SIGNIFICANT CHANGE UP (ref 22–31)
CREAT SERPL-MCNC: 0.94 MG/DL — SIGNIFICANT CHANGE UP (ref 0.5–1.3)
EGFR: 78 ML/MIN/1.73M2 — SIGNIFICANT CHANGE UP
EOSINOPHIL # BLD AUTO: 0.15 K/UL — SIGNIFICANT CHANGE UP (ref 0–0.5)
EOSINOPHIL NFR BLD AUTO: 2.6 % — SIGNIFICANT CHANGE UP (ref 0–6)
GLUCOSE SERPL-MCNC: 84 MG/DL — SIGNIFICANT CHANGE UP (ref 70–99)
HCT VFR BLD CALC: 37.9 % — LOW (ref 39–50)
HGB BLD-MCNC: 12.7 G/DL — LOW (ref 13–17)
IANC: 2.67 K/UL — SIGNIFICANT CHANGE UP (ref 1.8–7.4)
IMM GRANULOCYTES NFR BLD AUTO: 0.2 % — SIGNIFICANT CHANGE UP (ref 0–0.9)
LYMPHOCYTES # BLD AUTO: 2.21 K/UL — SIGNIFICANT CHANGE UP (ref 1–3.3)
LYMPHOCYTES # BLD AUTO: 38.8 % — SIGNIFICANT CHANGE UP (ref 13–44)
MAGNESIUM SERPL-MCNC: 2 MG/DL — SIGNIFICANT CHANGE UP (ref 1.6–2.6)
MCHC RBC-ENTMCNC: 33 PG — SIGNIFICANT CHANGE UP (ref 27–34)
MCHC RBC-ENTMCNC: 33.5 GM/DL — SIGNIFICANT CHANGE UP (ref 32–36)
MCV RBC AUTO: 98.4 FL — SIGNIFICANT CHANGE UP (ref 80–100)
MONOCYTES # BLD AUTO: 0.62 K/UL — SIGNIFICANT CHANGE UP (ref 0–0.9)
MONOCYTES NFR BLD AUTO: 10.9 % — SIGNIFICANT CHANGE UP (ref 2–14)
NEUTROPHILS # BLD AUTO: 2.67 K/UL — SIGNIFICANT CHANGE UP (ref 1.8–7.4)
NEUTROPHILS NFR BLD AUTO: 46.8 % — SIGNIFICANT CHANGE UP (ref 43–77)
NRBC # BLD: 0 /100 WBCS — SIGNIFICANT CHANGE UP (ref 0–0)
NRBC # FLD: 0 K/UL — SIGNIFICANT CHANGE UP (ref 0–0)
PHOSPHATE SERPL-MCNC: 1.8 MG/DL — LOW (ref 2.5–4.5)
PLATELET # BLD AUTO: 201 K/UL — SIGNIFICANT CHANGE UP (ref 150–400)
POTASSIUM SERPL-MCNC: 3.5 MMOL/L — SIGNIFICANT CHANGE UP (ref 3.5–5.3)
POTASSIUM SERPL-SCNC: 3.5 MMOL/L — SIGNIFICANT CHANGE UP (ref 3.5–5.3)
RBC # BLD: 3.85 M/UL — LOW (ref 4.2–5.8)
RBC # FLD: 14 % — SIGNIFICANT CHANGE UP (ref 10.3–14.5)
SODIUM SERPL-SCNC: 141 MMOL/L — SIGNIFICANT CHANGE UP (ref 135–145)
WBC # BLD: 5.7 K/UL — SIGNIFICANT CHANGE UP (ref 3.8–10.5)
WBC # FLD AUTO: 5.7 K/UL — SIGNIFICANT CHANGE UP (ref 3.8–10.5)

## 2022-12-12 PROCEDURE — 93306 TTE W/DOPPLER COMPLETE: CPT | Mod: 26

## 2022-12-12 PROCEDURE — 99233 SBSQ HOSP IP/OBS HIGH 50: CPT

## 2022-12-12 RX ORDER — THIAMINE MONONITRATE (VIT B1) 100 MG
100 TABLET ORAL DAILY
Refills: 0 | Status: DISCONTINUED | OUTPATIENT
Start: 2022-12-12 | End: 2022-12-20

## 2022-12-12 RX ORDER — POTASSIUM PHOSPHATE, MONOBASIC POTASSIUM PHOSPHATE, DIBASIC 236; 224 MG/ML; MG/ML
30 INJECTION, SOLUTION INTRAVENOUS ONCE
Refills: 0 | Status: COMPLETED | OUTPATIENT
Start: 2022-12-12 | End: 2022-12-12

## 2022-12-12 RX ADMIN — GABAPENTIN 300 MILLIGRAM(S): 400 CAPSULE ORAL at 12:50

## 2022-12-12 RX ADMIN — TAMSULOSIN HYDROCHLORIDE 0.4 MILLIGRAM(S): 0.4 CAPSULE ORAL at 22:24

## 2022-12-12 RX ADMIN — Medication 1 CAPSULE(S): at 17:50

## 2022-12-12 RX ADMIN — ATORVASTATIN CALCIUM 80 MILLIGRAM(S): 80 TABLET, FILM COATED ORAL at 22:24

## 2022-12-12 RX ADMIN — Medication 1 CAPSULE(S): at 12:52

## 2022-12-12 RX ADMIN — Medication 1 CAPSULE(S): at 08:36

## 2022-12-12 RX ADMIN — PANTOPRAZOLE SODIUM 40 MILLIGRAM(S): 20 TABLET, DELAYED RELEASE ORAL at 05:52

## 2022-12-12 RX ADMIN — SERTRALINE 25 MILLIGRAM(S): 25 TABLET, FILM COATED ORAL at 12:51

## 2022-12-12 RX ADMIN — ENOXAPARIN SODIUM 40 MILLIGRAM(S): 100 INJECTION SUBCUTANEOUS at 22:30

## 2022-12-12 RX ADMIN — POTASSIUM PHOSPHATE, MONOBASIC POTASSIUM PHOSPHATE, DIBASIC 83.33 MILLIMOLE(S): 236; 224 INJECTION, SOLUTION INTRAVENOUS at 12:52

## 2022-12-12 RX ADMIN — Medication 81 MILLIGRAM(S): at 12:52

## 2022-12-12 RX ADMIN — Medication 100 MILLIGRAM(S): at 22:25

## 2022-12-12 NOTE — DIETITIAN INITIAL EVALUATION ADULT - PERTINENT LABORATORY DATA
12-12    141  |  108<H>  |  7   ----------------------------<  84  3.5   |  25  |  0.94    Ca    8.4      12 Dec 2022 05:24  Phos  1.8     12-12  Mg     2.00     12-12    A1C with Estimated Average Glucose Result: 5.7 % (12-10-22 @ 05:30)

## 2022-12-12 NOTE — DIETITIAN NUTRITION RISK NOTIFICATION - TREATMENT: THE FOLLOWING DIET HAS BEEN RECOMMENDED
Diet, Soft and Bite Sized:   DASH/TLC {Sodium & Cholesterol Restricted} (DASH)  Supplement Feeding Modality:  Oral  Ensure Enlive Cans or Servings Per Day:  2       Frequency:  Two Times a day (12-11-22 @ 10:56) [Active]

## 2022-12-12 NOTE — PROGRESS NOTE ADULT - PROBLEM SELECTOR PLAN 4
- [id number] score = 5  - not on AC PTA (due to repeated falls, including w/ head trauma)  - holding home BB - [id number] score = 5  - not on AC PTA (due to repeated falls, including w/ head trauma)  - holding home BB - [id number] score = 5  - not on AC PTA (due to repeated falls, including w/ head trauma)  - holding home BB

## 2022-12-12 NOTE — DIETITIAN INITIAL EVALUATION ADULT - OTHER INFO
Pt 88 yo male with PMHx of HTN, CVA, Meningioma and A-fib, presented 2/2 generalized weakness, and frequent falls - per chart review.    At time of visit, Pt awake, alert; Pt speaks Maori.  phone service used (ID# [id number]). Per Pt, his appetite/PO intake good; no chewing or swallowing difficulty; no nausea, vomiting or diarrhea @ this time. S/P Swallow Bedside Assessment Adult, SLP rec: Regular Solids with Thin Liquids (12/12/22).     Per Pt, his height: 170 cm (~67"), Pt not sure about his actual body weight. No report of weight loss or weight changes PTA. Nutrition focused physical exam conducted, Pt found with signs of severe subcutaneous fat loss: [x] Buccal fat region, [x] Ribs region & muscle wasting: [x] Temples region, [x] Clavicle region [x] Shoulder region. Encourage Pt to drink PO supplement: Ensure - 2x daily as ordered. Pt's family brings food from home reported. Case discussed with nurse. RDN remains available, Pt made aware.  Pt 88 yo male with PMHx of HTN, CVA, Meningioma and A-fib, presented 2/2 generalized weakness, and frequent falls - per chart review.    At time of visit, Pt awake, alert; Pt speaks Syriac.  phone service used (ID# [id number]). Per Pt, his appetite/PO intake good; no chewing or swallowing difficulty; no nausea, vomiting or diarrhea @ this time. S/P Swallow Bedside Assessment Adult, SLP rec: Regular Solids with Thin Liquids (12/12/22).     Per Pt, his height: 170 cm (~67"), Pt not sure about his actual body weight. No report of weight loss or weight changes PTA. Nutrition focused physical exam conducted, Pt found with signs of severe subcutaneous fat loss: [x] Buccal fat region, [x] Ribs region & muscle wasting: [x] Temples region, [x] Clavicle region [x] Shoulder region. Encourage Pt to drink PO supplement: Ensure - 2x daily as ordered. Pt's family brings food from home reported. Case discussed with nurse. RDN remains available, Pt made aware.  Pt 86 yo male with PMHx of HTN, CVA, Meningioma and A-fib, presented 2/2 generalized weakness, and frequent falls - per chart review.    At time of visit, Pt awake, alert; Pt speaks Occitan.  phone service used (ID# [id number]). Per Pt, his appetite/PO intake good; no chewing or swallowing difficulty; no nausea, vomiting or diarrhea @ this time. S/P Swallow Bedside Assessment Adult, SLP rec: Regular Solids with Thin Liquids (12/12/22).     Per Pt, his height: 170 cm (~67"), Pt not sure about his actual body weight. No report of weight loss or weight changes PTA. Nutrition focused physical exam conducted, Pt found with signs of severe subcutaneous fat loss: [x] Buccal fat region, [x] Ribs region & muscle wasting: [x] Temples region, [x] Clavicle region [x] Shoulder region. Encourage Pt to drink PO supplement: Ensure - 2x daily as ordered. Pt's family brings food from home reported. Case discussed with nurse. RDN remains available, Pt made aware.

## 2022-12-12 NOTE — DIETITIAN INITIAL EVALUATION ADULT - NAME AND PHONE
Milly Leiva, MS, RDN (pager #[phone])  Milly Leiva, MS, RDN (pager #[phone])  Milly Leiva, MS, RDN (pager #[phone])

## 2022-12-12 NOTE — SWALLOW BEDSIDE ASSESSMENT ADULT - SWALLOW EVAL: CRITERIA FOR SKILLED INTERVENTION MET
The Service to family practice/pulm med order in workqueue [id number] requested on 7/19/2021 has been cancelled as, unable to contact. Ordering provider has been notified.    Please contact patient, if further communication is needed.    
no problems identified which require skilled intervention

## 2022-12-12 NOTE — PROGRESS NOTE ADULT - PROBLEM SELECTOR PLAN 1
- presented with reports of L-sided weakness and is s/p Stroke Code in the ED  - patient reports dizziness, including at rest, frequent falls - associated with head trauma as well  - passed dysphagia screen, diet ordered   - continue neurochecks, monitor tele   - HOB elevation.  Aspiration precautions.  Fall precautions  - neurology following, appreciate recommendations   [ ] f/u TTE with Bubble Study   [ ] expedite MR brain/cervical spine w/o IV contrast   - PT/OT/ SLP - presented with reports of L-sided weakness and is s/p Stroke Code in the ED  - patient reports dizziness, including at rest, frequent falls - associated with head trauma as well  - passed dysphagia screen, diet ordered   - continue neurochecks, monitor tele   - HOB elevation.  Aspiration precautions.  Fall precautions  - neurology following, appreciate recommendations   [ ] f/u TTE with Bubble Study   [ ] expedite MR brain/cervical spine w/o IV contrast   - PT recs rehab, SLP recs regular diet

## 2022-12-12 NOTE — PROGRESS NOTE ADULT - SUBJECTIVE AND OBJECTIVE BOX
Dr. Federica Horton  Pager [phone]    PROGRESS NOTE:     Patient is a 87y old  Male who presents with a chief complaint of Generalized weakness (11 Dec 2022 10:47)      SUBJECTIVE / OVERNIGHT EVENTS: pt denies chest pain or sob   ADDITIONAL REVIEW OF SYSTEMS: afebrile     MEDICATIONS  (STANDING):  aspirin  chewable 81 milliGRAM(s) Oral daily  atorvastatin 80 milliGRAM(s) Oral at bedtime  enoxaparin Injectable 40 milliGRAM(s) SubCutaneous every 24 hours  gabapentin 300 milliGRAM(s) Oral daily  pancrelipase  (CREON 24,000 Lipase Units) 1 Capsule(s) Oral three times a day with meals  pantoprazole    Tablet 40 milliGRAM(s) Oral before breakfast  sertraline 25 milliGRAM(s) Oral daily  tamsulosin 0.4 milliGRAM(s) Oral at bedtime    MEDICATIONS  (PRN):  acetaminophen     Tablet .. 650 milliGRAM(s) Oral every 6 hours PRN Mild Pain (1 - 3), Moderate Pain (4 - 6)      CAPILLARY BLOOD GLUCOSE        I&O's Summary      PHYSICAL EXAM:  Vital Signs Last 24 Hrs  T(C): 37.1 (12 Dec 2022 12:00), Max: 37.2 (11 Dec 2022 16:00)  T(F): 98.7 (12 Dec 2022 12:00), Max: 98.9 (11 Dec 2022 16:00)  HR: 70 (12 Dec 2022 12:00) (64 - 74)  BP: 105/66 (12 Dec 2022 12:00) (102/67 - 133/82)  BP(mean): --  RR: 18 (12 Dec 2022 12:00) (16 - 18)  SpO2: 98% (12 Dec 2022 12:00) (97% - 99%)    Parameters below as of 12 Dec 2022 12:00  Patient On (Oxygen Delivery Method): room air      CONSTITUTIONAL: NAD, well-developed  RESPIRATORY: Normal respiratory effort; lungs are clear to auscultation bilaterally  CARDIOVASCULAR: Regular rate and rhythm, normal S1 and S2, no murmur/rub/gallop; No lower extremity edema; Peripheral pulses are 2+ bilaterally  ABDOMEN: Nontender to palpation, normoactive bowel sounds, no rebound/guarding; No hepatosplenomegaly  MUSCULOSKELETAL: no clubbing or cyanosis of digits; no joint swelling or tenderness to palpation  PSYCH: A+O to person, place, and time; affect appropriate  Neuro: nonfocal, moves all extremities, 5/5 strength b/l    LABS:                        12.7   5.70  )-----------( 201      ( 12 Dec 2022 05:24 )             37.9     12-12    141  |  108<H>  |  7   ----------------------------<  84  3.5   |  25  |  0.94    Ca    8.4      12 Dec 2022 05:24  Phos  1.8     12-12  Mg     2.00     12-12        Culture - Urine (collected 09 Dec 2022 21:01)  Source: Clean Catch Clean Catch (Midstream)  Final Report (11 Dec 2022 07:08):    <10,000 CFU/mL Normal Urogenital Babs      RADIOLOGY & ADDITIONAL TESTS:  Results Reviewed:   Imaging Personally Reviewed:  < from: CT Lumbar Spine No Cont (12.09.22 @ 23:11) >    IMPRESSION:  Age indeterminate likely subacute to chronic T12 anterior compression   deformity without significant osseous retropulsion or definite   surrounding soft tissue edema.    Otherwise, no definite evidence of acute injury to the cervical, thoracic   or lumbar spine.        Electrocardiogram Personally Reviewed:    COORDINATION OF CARE:  Care Discussed with Consultants/Other Providers [Y/N]: laisha tidwell, pending MRI brain and MRI cervical spine  Prior or Outpatient Records Reviewed [Y/N]:   Dr. Federica Horton  Pager [phone]    PROGRESS NOTE:     Patient is a 87y old  Male who presents with a chief complaint of Generalized weakness (11 Dec 2022 10:47)      SUBJECTIVE / OVERNIGHT EVENTS: pt denies chest pain or sob   ADDITIONAL REVIEW OF SYSTEMS: afebrile     MEDICATIONS  (STANDING):  aspirin  chewable 81 milliGRAM(s) Oral daily  atorvastatin 80 milliGRAM(s) Oral at bedtime  enoxaparin Injectable 40 milliGRAM(s) SubCutaneous every 24 hours  gabapentin 300 milliGRAM(s) Oral daily  pancrelipase  (CREON 24,000 Lipase Units) 1 Capsule(s) Oral three times a day with meals  pantoprazole    Tablet 40 milliGRAM(s) Oral before breakfast  sertraline 25 milliGRAM(s) Oral daily  tamsulosin 0.4 milliGRAM(s) Oral at bedtime    MEDICATIONS  (PRN):  acetaminophen     Tablet .. 650 milliGRAM(s) Oral every 6 hours PRN Mild Pain (1 - 3), Moderate Pain (4 - 6)      CAPILLARY BLOOD GLUCOSE        I&O's Summary      PHYSICAL EXAM:  Vital Signs Last 24 Hrs  T(C): 37.1 (12 Dec 2022 12:00), Max: 37.2 (11 Dec 2022 16:00)  T(F): 98.7 (12 Dec 2022 12:00), Max: 98.9 (11 Dec 2022 16:00)  HR: 70 (12 Dec 2022 12:00) (64 - 74)  BP: 105/66 (12 Dec 2022 12:00) (102/67 - 133/82)  BP(mean): --  RR: 18 (12 Dec 2022 12:00) (16 - 18)  SpO2: 98% (12 Dec 2022 12:00) (97% - 99%)    Parameters below as of 12 Dec 2022 12:00  Patient On (Oxygen Delivery Method): room air      CONSTITUTIONAL: NAD, well-developed  RESPIRATORY: Normal respiratory effort; lungs are clear to auscultation bilaterally  CARDIOVASCULAR: Regular rate and rhythm, normal S1 and S2, no murmur/rub/gallop; No lower extremity edema; Peripheral pulses are 2+ bilaterally  ABDOMEN: Nontender to palpation, normoactive bowel sounds, no rebound/guarding; No hepatosplenomegaly  MUSCULOSKELETAL: no clubbing or cyanosis of digits; no joint swelling or tenderness to palpation  PSYCH: A+O to person, place, and time; affect appropriate  Neuro: nonfocal, moves all extremities, 5/5 strength b/l    LABS:                        12.7   5.70  )-----------( 201      ( 12 Dec 2022 05:24 )             37.9     12-12    141  |  108<H>  |  7   ----------------------------<  84  3.5   |  25  |  0.94    Ca    8.4      12 Dec 2022 05:24  Phos  1.8     12-12  Mg     2.00     12-12        Culture - Urine (collected 09 Dec 2022 21:01)  Source: Clean Catch Clean Catch (Midstream)  Final Report (11 Dec 2022 07:08):    <10,000 CFU/mL Normal Urogenital Babs      RADIOLOGY & ADDITIONAL TESTS:  Results Reviewed:   Imaging Personally Reviewed:  < from: CT Lumbar Spine No Cont (12.09.22 @ 23:11) >    IMPRESSION:  Age indeterminate likely subacute to chronic T12 anterior compression   deformity without significant osseous retropulsion or definite   surrounding soft tissue edema.    Otherwise, no definite evidence of acute injury to the cervical, thoracic   or lumbar spine.        Electrocardiogram Personally Reviewed:    COORDINATION OF CARE:  Care Discussed with Consultants/Other Providers [Y/N]: laisha tidwell, pending MRI brain and MRI cervical spine  Prior or Outpatient Records Reviewed [Y/N]:   Dr. Federica Horton  Pager [phone]    PROGRESS NOTE:     Patient is a 87y old  Male who presents with a chief complaint of Generalized weakness (11 Dec 2022 10:47)      SUBJECTIVE / OVERNIGHT EVENTS: pt denies chest pain or sob   ADDITIONAL REVIEW OF SYSTEMS: afebrile     MEDICATIONS  (STANDING):  aspirin  chewable 81 milliGRAM(s) Oral daily  atorvastatin 80 milliGRAM(s) Oral at bedtime  enoxaparin Injectable 40 milliGRAM(s) SubCutaneous every 24 hours  gabapentin 300 milliGRAM(s) Oral daily  pancrelipase  (CREON 24,000 Lipase Units) 1 Capsule(s) Oral three times a day with meals  pantoprazole    Tablet 40 milliGRAM(s) Oral before breakfast  sertraline 25 milliGRAM(s) Oral daily  tamsulosin 0.4 milliGRAM(s) Oral at bedtime    MEDICATIONS  (PRN):  acetaminophen     Tablet .. 650 milliGRAM(s) Oral every 6 hours PRN Mild Pain (1 - 3), Moderate Pain (4 - 6)      CAPILLARY BLOOD GLUCOSE        I&O's Summary      PHYSICAL EXAM:  Vital Signs Last 24 Hrs  T(C): 37.1 (12 Dec 2022 12:00), Max: 37.2 (11 Dec 2022 16:00)  T(F): 98.7 (12 Dec 2022 12:00), Max: 98.9 (11 Dec 2022 16:00)  HR: 70 (12 Dec 2022 12:00) (64 - 74)  BP: 105/66 (12 Dec 2022 12:00) (102/67 - 133/82)  BP(mean): --  RR: 18 (12 Dec 2022 12:00) (16 - 18)  SpO2: 98% (12 Dec 2022 12:00) (97% - 99%)    Parameters below as of 12 Dec 2022 12:00  Patient On (Oxygen Delivery Method): room air      CONSTITUTIONAL: NAD, well-developed  RESPIRATORY: Normal respiratory effort; lungs are clear to auscultation bilaterally  CARDIOVASCULAR: Regular rate and rhythm, normal S1 and S2, no murmur/rub/gallop; No lower extremity edema; Peripheral pulses are 2+ bilaterally  ABDOMEN: Nontender to palpation, normoactive bowel sounds, no rebound/guarding; No hepatosplenomegaly  MUSCULOSKELETAL: no clubbing or cyanosis of digits; no joint swelling or tenderness to palpation  PSYCH: A+O to person, place, and time; affect appropriate  Neuro: nonfocal, moves all extremities, 5/5 strength b/l    LABS:                        12.7   5.70  )-----------( 201      ( 12 Dec 2022 05:24 )             37.9     12-12    141  |  108<H>  |  7   ----------------------------<  84  3.5   |  25  |  0.94    Ca    8.4      12 Dec 2022 05:24  Phos  1.8     12-12  Mg     2.00     12-12        Culture - Urine (collected 09 Dec 2022 21:01)  Source: Clean Catch Clean Catch (Midstream)  Final Report (11 Dec 2022 07:08):    <10,000 CFU/mL Normal Urogenital Babs      RADIOLOGY & ADDITIONAL TESTS:  Results Reviewed:   Imaging Personally Reviewed:  < from: CT Lumbar Spine No Cont (12.09.22 @ 23:11) >    IMPRESSION:  Age indeterminate likely subacute to chronic T12 anterior compression   deformity without significant osseous retropulsion or definite   surrounding soft tissue edema.    Otherwise, no definite evidence of acute injury to the cervical, thoracic   or lumbar spine.        Electrocardiogram Personally Reviewed:    COORDINATION OF CARE:  Care Discussed with Consultants/Other Providers [Y/N]: laisha tidwell, pending MRI brain and MRI cervical spine  Prior or Outpatient Records Reviewed [Y/N]:

## 2022-12-12 NOTE — SWALLOW BEDSIDE ASSESSMENT ADULT - SWALLOW EVAL: DIAGNOSIS
1- Functional oral stage for puree, soft and bite sized solids, regular solids, and thin liquids marked by adequate oral containment, adequate manipulation and adequate mastication, adequate anterior to posterior transport with oral clearance noted. 2- Functional pharyngeal stage for puree, soft and bite sized solids, regular solids, and thin liquids marked by initiation of pharyngeal swallow trigger and hyolaryngeal excursion upon palpation. No overt sign or symptoms of aspiration/penetration noted across PO consistencies.

## 2022-12-12 NOTE — PROGRESS NOTE ADULT - PROBLEM SELECTOR PLAN 6
# Gastric cancer s/p resection   Family reports poor PO intake since gastric surgery several years ago  has been losing weight since then, BMI 18  started ensure BID, nutrition eval # Gastric cancer s/p resection   Family reports poor PO intake since gastric surgery several years ago  has been losing weight since then, BMI 18  started ensure BID, nutrition eval  check vit b12/folate, trial of iv thiamine # Gastric cancer s/p resection   Family reports poor PO intake since gastric surgery several years ago  has been losing weight since then, BMI 18  started ensure BID, nutrition eval  po thiamine, mvi  b12 996, folate 18.7, tsh 6.79, T4 6.83, A1c 5.7%

## 2022-12-12 NOTE — DIETITIAN INITIAL EVALUATION ADULT - NS FNS DIET ORDER
Soft and Bite Sized: DASH/TLC {Sodium & Cholesterol Restricted} (DASH)  Supplement Feeding Modality:  Oral  Ensure Enlive Cans or Servings Per Day:  2       Frequency:  Two Times a day (12-11-22 @ 10:56) [Active]

## 2022-12-12 NOTE — SWALLOW BEDSIDE ASSESSMENT ADULT - COMMENTS
Progress Note- Hospitalist 12/11: "87 year old male, with past history significant for HTN, CVA, Meningioma and A-fib, presented to the ED secondary to generalized weakness, and frequent falls. Code Stroke was called for left sided weakness. CT head, CTA H&N, showed patient's known meningioma with no other acute intracranial abnormality and no LVO."    Neuro Note 12/9: "Impression: L hemiparesis concerning for R brain dysfunction, likely ischemic stroke of unknown mechanism. Also found with hyperreflexia, upgoing toes in LLE, suspect potential cervical spine pathology as well. Per family, do not suspect trauma to c/t/l spine but rather head during falls."    CR Chest 12/9: "IMPRESSION: Clear lungs. No pleural effusions or pneumothorax."    CT Head 12/9: "IMPRESSION: HEAD CT: Mild volume loss, microvascular disease, no acute hemorrhage or midline shift. Left frontal probable meningioma 1.6 cm x 0.9 cm."    Patient seen at bedside awake/alert during clinical swallow evaluation this AM. Per RN, patient is not demonstrating any difficulty swallowing during mealtimes or PO meds. Patient is primarily Amharic speaking, [employer] utilized (#[id number]). Upon questioning, patient denies any difficulty swallowing. Progress Note- Hospitalist 12/11: "87 year old male, with past history significant for HTN, CVA, Meningioma and A-fib, presented to the ED secondary to generalized weakness, and frequent falls. Code Stroke was called for left sided weakness. CT head, CTA H&N, showed patient's known meningioma with no other acute intracranial abnormality and no LVO."    Neuro Note 12/9: "Impression: L hemiparesis concerning for R brain dysfunction, likely ischemic stroke of unknown mechanism. Also found with hyperreflexia, upgoing toes in LLE, suspect potential cervical spine pathology as well. Per family, do not suspect trauma to c/t/l spine but rather head during falls."    CR Chest 12/9: "IMPRESSION: Clear lungs. No pleural effusions or pneumothorax."    CT Head 12/9: "IMPRESSION: HEAD CT: Mild volume loss, microvascular disease, no acute hemorrhage or midline shift. Left frontal probable meningioma 1.6 cm x 0.9 cm."    Patient seen at bedside awake/alert during clinical swallow evaluation this AM. Per RN, patient is not demonstrating any difficulty swallowing during mealtimes or PO meds. Patient is primarily Polish speaking, [employer] utilized (#[id number]). Upon questioning, patient denies any difficulty swallowing. Progress Note- Hospitalist 12/11: "87 year old male, with past history significant for HTN, CVA, Meningioma and A-fib, presented to the ED secondary to generalized weakness, and frequent falls. Code Stroke was called for left sided weakness. CT head, CTA H&N, showed patient's known meningioma with no other acute intracranial abnormality and no LVO."    Neuro Note 12/9: "Impression: L hemiparesis concerning for R brain dysfunction, likely ischemic stroke of unknown mechanism. Also found with hyperreflexia, upgoing toes in LLE, suspect potential cervical spine pathology as well. Per family, do not suspect trauma to c/t/l spine but rather head during falls."    CR Chest 12/9: "IMPRESSION: Clear lungs. No pleural effusions or pneumothorax."    CT Head 12/9: "IMPRESSION: HEAD CT: Mild volume loss, microvascular disease, no acute hemorrhage or midline shift. Left frontal probable meningioma 1.6 cm x 0.9 cm."    Patient seen at bedside awake/alert during clinical swallow evaluation this AM. Per RN, patient is not demonstrating any difficulty swallowing during mealtimes or PO meds. Patient is primarily Serbian speaking, [employer] utilized (#[id number]). Upon questioning, patient denies any difficulty swallowing. Progress Note- Hospitalist 12/11: "87 year old male, with past history significant for HTN, CVA, Meningioma and A-fib, presented to the ED secondary to generalized weakness, and frequent falls. Code Stroke was called for left sided weakness. CT head, CTA H&N, showed patient's known meningioma with no other acute intracranial abnormality and no LVO."    Neuro Note 12/9: "Impression: L hemiparesis concerning for R brain dysfunction, likely ischemic stroke of unknown mechanism. Also found with hyperreflexia, upgoing toes in LLE, suspect potential cervical spine pathology as well. Per family, do not suspect trauma to c/t/l spine but rather head during falls."    CR Chest 12/9: "IMPRESSION: Clear lungs. No pleural effusions or pneumothorax."    CT Head 12/9: "IMPRESSION: HEAD CT: Mild volume loss, microvascular disease, no acute hemorrhage or midline shift. Left frontal probable meningioma 1.6 cm x 0.9 cm."    Patient seen at bedside awake/alert during clinical swallow evaluation this AM. Per RN, patient is not demonstrating any difficulty swallowing during mealtimes or PO meds. Patient is primarily Mohawk speaking, [employer] utilized (#[id number]). Patient is able to follow simple commands. Upon questioning, patient denies any difficulty swallowing. Progress Note- Hospitalist 12/11: "87 year old male, with past history significant for HTN, CVA, Meningioma and A-fib, presented to the ED secondary to generalized weakness, and frequent falls. Code Stroke was called for left sided weakness. CT head, CTA H&N, showed patient's known meningioma with no other acute intracranial abnormality and no LVO."    Neuro Note 12/9: "Impression: L hemiparesis concerning for R brain dysfunction, likely ischemic stroke of unknown mechanism. Also found with hyperreflexia, upgoing toes in LLE, suspect potential cervical spine pathology as well. Per family, do not suspect trauma to c/t/l spine but rather head during falls."    CR Chest 12/9: "IMPRESSION: Clear lungs. No pleural effusions or pneumothorax."    CT Head 12/9: "IMPRESSION: HEAD CT: Mild volume loss, microvascular disease, no acute hemorrhage or midline shift. Left frontal probable meningioma 1.6 cm x 0.9 cm."    Patient seen at bedside awake/alert during clinical swallow evaluation this AM. Per RN, patient is not demonstrating any difficulty swallowing during mealtimes or PO meds. Patient is primarily Kazakh speaking, [employer] utilized (#[id number]). Patient is able to follow simple commands. Upon questioning, patient denies any difficulty swallowing. Progress Note- Hospitalist 12/11: "87 year old male, with past history significant for HTN, CVA, Meningioma and A-fib, presented to the ED secondary to generalized weakness, and frequent falls. Code Stroke was called for left sided weakness. CT head, CTA H&N, showed patient's known meningioma with no other acute intracranial abnormality and no LVO."    Neuro Note 12/9: "Impression: L hemiparesis concerning for R brain dysfunction, likely ischemic stroke of unknown mechanism. Also found with hyperreflexia, upgoing toes in LLE, suspect potential cervical spine pathology as well. Per family, do not suspect trauma to c/t/l spine but rather head during falls."    CR Chest 12/9: "IMPRESSION: Clear lungs. No pleural effusions or pneumothorax."    CT Head 12/9: "IMPRESSION: HEAD CT: Mild volume loss, microvascular disease, no acute hemorrhage or midline shift. Left frontal probable meningioma 1.6 cm x 0.9 cm."    Patient seen at bedside awake/alert during clinical swallow evaluation this AM. Per RN, patient is not demonstrating any difficulty swallowing during mealtimes or PO meds. Patient is primarily Maltese speaking, [employer] utilized (#[id number]). Patient is able to follow simple commands. Upon questioning, patient denies any difficulty swallowing.

## 2022-12-12 NOTE — DIETITIAN INITIAL EVALUATION ADULT - WEIGHT USED FOR CALCULATIONS
EXAMINATION TYPE: CT abdomen pelvis w con

 

DATE OF EXAM: 11/15/2017

 

COMPARISON: NONE

 

HISTORY: Right lower quadrant pain with nausea, vomiting, diarrhea and fever x 1 week.

 

CT DLP: 352.90 mGycm, Automated Exposure Control for Dose Reduction was Utilized.

 

CONTRAST: 

CT scan of the abdomen and pelvis is performed without oral but with IV Contrast, patient injected wi
th 100 mL of Omnipaque 300.

 

FINDINGS:

 

LUNG BASES:  No significant abnormality is appreciated.

 

LIVER/GB:   No significant abnormality is appreciated.

 

PANCREAS:  No significant abnormality is seen.

 

SPLEEN:  No significant abnormality is seen.

 

ADRENALS:  No significant abnormality is seen.

 

KIDNEYS:  No significant abnormality is seen.

 

BOWEL: Evaluation bowel is suboptimal due to lack of enteric contrast as well as patient having littl
e intra-abdominal fat. Appendix is not well seen but likely within normal limits seen best near coron
al images 31 and 32. No inflammatory change at base of cecum is identified.

 

PROSTATE/SEMINAL VESICLES:  No gross abnormality seen.

 

LYMPH NODES:  No greater than 1cm abdominal or pelvic lymph nodes are appreciated.

 

OSSEOUS STRUCTURES:  No significant abnormality is seen.

 

OTHER:  No significant additional abnormality is seen.

 

IMPRESSION:  No bowel obstruction is seen. No convincing CT evidence for acute appendicitis. No signi
ficant finding is seen to account for patient's symptoms. current weight

## 2022-12-12 NOTE — PROGRESS NOTE ADULT - PROBLEM SELECTOR PLAN 3
- phosphorus = 2.4  - likely contributing toward fatigue/weakness  - supplementation prescribed; please f/u for resolution - low phos 1.8  - likely contributing toward fatigue/weakness  - supplementation prescribed; trend BMP, Mg, phos daily  - encourage po intake

## 2022-12-12 NOTE — DIETITIAN INITIAL EVALUATION ADULT - NSICDXPASTMEDICALHX_GEN_ALL_CORE_FT
How Severe Is Your Eczema?: moderate
Is This A New Presentation, Or A Follow-Up?: Rash
Additional History: Patient feels fluocinonide helped, but did not resolve the rash
PAST MEDICAL HISTORY:  Atrial fibrillation     Back pain     Cancer, colon     Cerebrovascular accident (CVA)     Gastric cancer     HTN (hypertension)     Meningioma     Osteoporosis     Shingles     Stomach cancer

## 2022-12-12 NOTE — SWALLOW BEDSIDE ASSESSMENT ADULT - ASR SWALLOW RECOMMEND DIAG
Consider cinesophragram if concern for evolving CVA given code stroke called at MD's discretion./VFSS/MBS

## 2022-12-13 LAB
ANION GAP SERPL CALC-SCNC: 7 MMOL/L — SIGNIFICANT CHANGE UP (ref 7–14)
BASOPHILS # BLD AUTO: 0.04 K/UL — SIGNIFICANT CHANGE UP (ref 0–0.2)
BASOPHILS NFR BLD AUTO: 0.8 % — SIGNIFICANT CHANGE UP (ref 0–2)
BUN SERPL-MCNC: 7 MG/DL — SIGNIFICANT CHANGE UP (ref 7–23)
CALCIUM SERPL-MCNC: 8.4 MG/DL — SIGNIFICANT CHANGE UP (ref 8.4–10.5)
CHLORIDE SERPL-SCNC: 108 MMOL/L — HIGH (ref 98–107)
CO2 SERPL-SCNC: 23 MMOL/L — SIGNIFICANT CHANGE UP (ref 22–31)
CREAT SERPL-MCNC: 0.91 MG/DL — SIGNIFICANT CHANGE UP (ref 0.5–1.3)
EGFR: 82 ML/MIN/1.73M2 — SIGNIFICANT CHANGE UP
EOSINOPHIL # BLD AUTO: 0.25 K/UL — SIGNIFICANT CHANGE UP (ref 0–0.5)
EOSINOPHIL NFR BLD AUTO: 5.1 % — SIGNIFICANT CHANGE UP (ref 0–6)
GLUCOSE SERPL-MCNC: 84 MG/DL — SIGNIFICANT CHANGE UP (ref 70–99)
HCT VFR BLD CALC: 37.3 % — LOW (ref 39–50)
HGB BLD-MCNC: 12.2 G/DL — LOW (ref 13–17)
IANC: 2.09 K/UL — SIGNIFICANT CHANGE UP (ref 1.8–7.4)
IMM GRANULOCYTES NFR BLD AUTO: 0.2 % — SIGNIFICANT CHANGE UP (ref 0–0.9)
LYMPHOCYTES # BLD AUTO: 2.03 K/UL — SIGNIFICANT CHANGE UP (ref 1–3.3)
LYMPHOCYTES # BLD AUTO: 41.6 % — SIGNIFICANT CHANGE UP (ref 13–44)
MAGNESIUM SERPL-MCNC: 2.3 MG/DL — SIGNIFICANT CHANGE UP (ref 1.6–2.6)
MCHC RBC-ENTMCNC: 32.7 GM/DL — SIGNIFICANT CHANGE UP (ref 32–36)
MCHC RBC-ENTMCNC: 32.7 PG — SIGNIFICANT CHANGE UP (ref 27–34)
MCV RBC AUTO: 100 FL — SIGNIFICANT CHANGE UP (ref 80–100)
MONOCYTES # BLD AUTO: 0.46 K/UL — SIGNIFICANT CHANGE UP (ref 0–0.9)
MONOCYTES NFR BLD AUTO: 9.4 % — SIGNIFICANT CHANGE UP (ref 2–14)
NEUTROPHILS # BLD AUTO: 2.09 K/UL — SIGNIFICANT CHANGE UP (ref 1.8–7.4)
NEUTROPHILS NFR BLD AUTO: 42.9 % — LOW (ref 43–77)
NRBC # BLD: 0 /100 WBCS — SIGNIFICANT CHANGE UP (ref 0–0)
NRBC # FLD: 0 K/UL — SIGNIFICANT CHANGE UP (ref 0–0)
PHOSPHATE SERPL-MCNC: 3.4 MG/DL — SIGNIFICANT CHANGE UP (ref 2.5–4.5)
PLATELET # BLD AUTO: 199 K/UL — SIGNIFICANT CHANGE UP (ref 150–400)
POTASSIUM SERPL-MCNC: 3.7 MMOL/L — SIGNIFICANT CHANGE UP (ref 3.5–5.3)
POTASSIUM SERPL-SCNC: 3.7 MMOL/L — SIGNIFICANT CHANGE UP (ref 3.5–5.3)
RBC # BLD: 3.73 M/UL — LOW (ref 4.2–5.8)
RBC # FLD: 13.8 % — SIGNIFICANT CHANGE UP (ref 10.3–14.5)
SODIUM SERPL-SCNC: 138 MMOL/L — SIGNIFICANT CHANGE UP (ref 135–145)
VIT B1 SERPL-MCNC: 82.1 NMOL/L — SIGNIFICANT CHANGE UP (ref 66.5–200)
WBC # BLD: 4.88 K/UL — SIGNIFICANT CHANGE UP (ref 3.8–10.5)
WBC # FLD AUTO: 4.88 K/UL — SIGNIFICANT CHANGE UP (ref 3.8–10.5)

## 2022-12-13 PROCEDURE — 99233 SBSQ HOSP IP/OBS HIGH 50: CPT

## 2022-12-13 RX ADMIN — Medication 1 CAPSULE(S): at 18:01

## 2022-12-13 RX ADMIN — PANTOPRAZOLE SODIUM 40 MILLIGRAM(S): 20 TABLET, DELAYED RELEASE ORAL at 06:09

## 2022-12-13 RX ADMIN — SERTRALINE 25 MILLIGRAM(S): 25 TABLET, FILM COATED ORAL at 12:35

## 2022-12-13 RX ADMIN — ENOXAPARIN SODIUM 40 MILLIGRAM(S): 100 INJECTION SUBCUTANEOUS at 22:20

## 2022-12-13 RX ADMIN — Medication 81 MILLIGRAM(S): at 12:34

## 2022-12-13 RX ADMIN — Medication 1 TABLET(S): at 12:33

## 2022-12-13 RX ADMIN — GABAPENTIN 300 MILLIGRAM(S): 400 CAPSULE ORAL at 12:33

## 2022-12-13 RX ADMIN — Medication 1 CAPSULE(S): at 09:22

## 2022-12-13 RX ADMIN — Medication 1 CAPSULE(S): at 12:34

## 2022-12-13 RX ADMIN — ATORVASTATIN CALCIUM 80 MILLIGRAM(S): 80 TABLET, FILM COATED ORAL at 22:20

## 2022-12-13 RX ADMIN — TAMSULOSIN HYDROCHLORIDE 0.4 MILLIGRAM(S): 0.4 CAPSULE ORAL at 22:20

## 2022-12-13 RX ADMIN — Medication 100 MILLIGRAM(S): at 12:33

## 2022-12-13 NOTE — PROGRESS NOTE ADULT - SUBJECTIVE AND OBJECTIVE BOX
Dr. Federica Horton  Pager [phone]    PROGRESS NOTE:     Patient is a 87y old  Male who presents with a chief complaint of Generalized weakness (12 Dec 2022 13:33)      SUBJECTIVE / OVERNIGHT EVENTS: no complaints  ADDITIONAL REVIEW OF SYSTEMS: afebrile     MEDICATIONS  (STANDING):  aspirin  chewable 81 milliGRAM(s) Oral daily  atorvastatin 80 milliGRAM(s) Oral at bedtime  enoxaparin Injectable 40 milliGRAM(s) SubCutaneous every 24 hours  gabapentin 300 milliGRAM(s) Oral daily  multivitamin 1 Tablet(s) Oral daily  pancrelipase  (CREON 24,000 Lipase Units) 1 Capsule(s) Oral three times a day with meals  pantoprazole    Tablet 40 milliGRAM(s) Oral before breakfast  sertraline 25 milliGRAM(s) Oral daily  tamsulosin 0.4 milliGRAM(s) Oral at bedtime  thiamine 100 milliGRAM(s) Oral daily    MEDICATIONS  (PRN):  acetaminophen     Tablet .. 650 milliGRAM(s) Oral every 6 hours PRN Mild Pain (1 - 3), Moderate Pain (4 - 6)      CAPILLARY BLOOD GLUCOSE        I&O's Summary      PHYSICAL EXAM:  Vital Signs Last 24 Hrs  T(C): 37.1 (13 Dec 2022 11:35), Max: 37.1 (13 Dec 2022 00:00)  T(F): 98.7 (13 Dec 2022 11:35), Max: 98.7 (13 Dec 2022 00:00)  HR: 143 (13 Dec 2022 12:38) (61 - 143)  BP: 106/65 (13 Dec 2022 11:35) (105/61 - 111/70)  BP(mean): --  RR: 17 (13 Dec 2022 11:35) (16 - 18)  SpO2: 97% (13 Dec 2022 11:35) (97% - 99%)    Parameters below as of 13 Dec 2022 11:35  Patient On (Oxygen Delivery Method): room air      General: NAD, awake and alert  Eyes: PERRLA, conjunctiva clear, nonicteric sclera  HENMT: MMM  Respiratory: No respiratory distress, CTABL, No rales, rhonchi, wheezing.  Cardiovascular: S1,S2; Regular rate and rhythm; No m/g/r. 2+ peripheral pulses  Gastrointestinal: Soft, Nontender, Nondistended; +BS.   Extremities: No c/c/e; warm to touch  Neurological: Strength: CN Ii-XII intact. 5/5 bilateral handgrip, biceps, quadriceps hamstrings, calf. Sensation to LT grossly in tact in all 4 extremities.  Skin: No rashes, No erythema   Psych: AAOx2 (knows name and that he is in hospital) appropriate mood and affect    LABS:                        12.2   4.88  )-----------( 199      ( 13 Dec 2022 07:16 )             37.3     12-13    138  |  108<H>  |  7   ----------------------------<  84  3.7   |  23  |  0.91    Ca    8.4      13 Dec 2022 07:16  Phos  3.4     12-13  Mg     2.30     12-13                  RADIOLOGY & ADDITIONAL TESTS:  Results Reviewed:   Imaging Personally Reviewed:  < from: Transthoracic Echocardiogram (12.12.22 @ 14:47) >  CONCLUSIONS:  LVEF 67%  1. Mitral annular calcification, otherwise normal mitral  valve. Mild mitral regurgitation.  2. Calcified trileaflet aortic valve with normal opening.  Mild aortic regurgitation.  3. Normal left ventricular internal dimensions and wall  thicknesses.  4. Endocardium not well visualized; grossly normal left  ventricular systolic function.  5. The right ventricle is not well visualized; grossly  normal right ventricular systolic function.  6. A bubble study was performed with the intravenous  injection of agitated saline.  Following contrast  injection, no obvious bubbles were seen in the left heart.        Electrocardiogram Personally Reviewed:    COORDINATION OF CARE:  Care Discussed with Consultants/Other Providers [Y/N]: laisha tidwell, expedite mri  Prior or Outpatient Records Reviewed [Y/N]:   Dr. Federica Horton  Pager [phone]    PROGRESS NOTE:     Patient is a 87y old  Male who presents with a chief complaint of Generalized weakness (12 Dec 2022 13:33)      SUBJECTIVE / OVERNIGHT EVENTS: no complaints  ADDITIONAL REVIEW OF SYSTEMS: afebrile     MEDICATIONS  (STANDING):  aspirin  chewable 81 milliGRAM(s) Oral daily  atorvastatin 80 milliGRAM(s) Oral at bedtime  enoxaparin Injectable 40 milliGRAM(s) SubCutaneous every 24 hours  gabapentin 300 milliGRAM(s) Oral daily  multivitamin 1 Tablet(s) Oral daily  pancrelipase  (CREON 24,000 Lipase Units) 1 Capsule(s) Oral three times a day with meals  pantoprazole    Tablet 40 milliGRAM(s) Oral before breakfast  sertraline 25 milliGRAM(s) Oral daily  tamsulosin 0.4 milliGRAM(s) Oral at bedtime  thiamine 100 milliGRAM(s) Oral daily    MEDICATIONS  (PRN):  acetaminophen     Tablet .. 650 milliGRAM(s) Oral every 6 hours PRN Mild Pain (1 - 3), Moderate Pain (4 - 6)      CAPILLARY BLOOD GLUCOSE        I&O's Summary      PHYSICAL EXAM:  Vital Signs Last 24 Hrs  T(C): 37.1 (13 Dec 2022 11:35), Max: 37.1 (13 Dec 2022 00:00)  T(F): 98.7 (13 Dec 2022 11:35), Max: 98.7 (13 Dec 2022 00:00)  HR: 143 (13 Dec 2022 12:38) (61 - 143)  BP: 106/65 (13 Dec 2022 11:35) (105/61 - 111/70)  BP(mean): --  RR: 17 (13 Dec 2022 11:35) (16 - 18)  SpO2: 97% (13 Dec 2022 11:35) (97% - 99%)    Parameters below as of 13 Dec 2022 11:35  Patient On (Oxygen Delivery Method): room air      General: NAD, awake and alert  Eyes: PERRLA, conjunctiva clear, nonicteric sclera  HENMT: MMM  Respiratory: No respiratory distress, CTABL, No rales, rhonchi, wheezing.  Cardiovascular: S1,S2; Regular rate and rhythm; No m/g/r. 2+ peripheral pulses  Gastrointestinal: Soft, Nontender, Nondistended; +BS.   Extremities: No c/c/e; warm to touch  Neurological: Strength: CN Ii-XII intact. 5/5 bilateral handgrip, biceps, quadriceps hamstrings, calf. Sensation to LT grossly in tact in all 4 extremities.  Skin: No rashes, No erythema   Psych: AAOx2 (knows name and that he is in hospital) appropriate mood and affect    LABS:                        12.2   4.88  )-----------( 199      ( 13 Dec 2022 07:16 )             37.3     12-13    138  |  108<H>  |  7   ----------------------------<  84  3.7   |  23  |  0.91    Ca    8.4      13 Dec 2022 07:16  Phos  3.4     12-13  Mg     2.30     12-13                  RADIOLOGY & ADDITIONAL TESTS:  Results Reviewed:   Imaging Personally Reviewed:  < from: Transthoracic Echocardiogram (12.12.22 @ 14:47) >  CONCLUSIONS:  LVEF 67%  1. Mitral annular calcification, otherwise normal mitral  valve. Mild mitral regurgitation.  2. Calcified trileaflet aortic valve with normal opening.  Mild aortic regurgitation.  3. Normal left ventricular internal dimensions and wall  thicknesses.  4. Endocardium not well visualized; grossly normal left  ventricular systolic function.  5. The right ventricle is not well visualized; grossly  normal right ventricular systolic function.  6. A bubble study was performed with the intravenous  injection of agitated saline.  Following contrast  injection, no obvious bubbles were seen in the left heart.        Electrocardiogram Personally Reviewed:    COORDINATION OF CARE:  Care Discussed with Consultants/Other Providers [Y/N]: laisha tidwell, expedite mri  Prior or Outpatient Records Reviewed [Y/N]:   Dr. Federica Horton  Pager [phone]    PROGRESS NOTE:     Patient is a 87y old  Male who presents with a chief complaint of Generalized weakness (12 Dec 2022 13:33)      SUBJECTIVE / OVERNIGHT EVENTS: no complaints  ADDITIONAL REVIEW OF SYSTEMS: afebrile     MEDICATIONS  (STANDING):  aspirin  chewable 81 milliGRAM(s) Oral daily  atorvastatin 80 milliGRAM(s) Oral at bedtime  enoxaparin Injectable 40 milliGRAM(s) SubCutaneous every 24 hours  gabapentin 300 milliGRAM(s) Oral daily  multivitamin 1 Tablet(s) Oral daily  pancrelipase  (CREON 24,000 Lipase Units) 1 Capsule(s) Oral three times a day with meals  pantoprazole    Tablet 40 milliGRAM(s) Oral before breakfast  sertraline 25 milliGRAM(s) Oral daily  tamsulosin 0.4 milliGRAM(s) Oral at bedtime  thiamine 100 milliGRAM(s) Oral daily    MEDICATIONS  (PRN):  acetaminophen     Tablet .. 650 milliGRAM(s) Oral every 6 hours PRN Mild Pain (1 - 3), Moderate Pain (4 - 6)      CAPILLARY BLOOD GLUCOSE        I&O's Summary      PHYSICAL EXAM:  Vital Signs Last 24 Hrs  T(C): 37.1 (13 Dec 2022 11:35), Max: 37.1 (13 Dec 2022 00:00)  T(F): 98.7 (13 Dec 2022 11:35), Max: 98.7 (13 Dec 2022 00:00)  HR: 143 (13 Dec 2022 12:38) (61 - 143)  BP: 106/65 (13 Dec 2022 11:35) (105/61 - 111/70)  BP(mean): --  RR: 17 (13 Dec 2022 11:35) (16 - 18)  SpO2: 97% (13 Dec 2022 11:35) (97% - 99%)    Parameters below as of 13 Dec 2022 11:35  Patient On (Oxygen Delivery Method): room air      General: NAD, awake and alert  Eyes: PERRLA, conjunctiva clear, nonicteric sclera  HENMT: MMM  Respiratory: No respiratory distress, CTABL, No rales, rhonchi, wheezing.  Cardiovascular: S1,S2; Regular rate and rhythm; No m/g/r. 2+ peripheral pulses  Gastrointestinal: Soft, Nontender, Nondistended; +BS.   Extremities: No c/c/e; warm to touch  Neurological: Strength: CN Ii-XII intact. 5/5 bilateral handgrip, biceps, quadriceps hamstrings, calf. Sensation to LT grossly in tact in all 4 extremities.  Skin: No rashes, No erythema   Psych: AAOx2 (knows name and that he is in hospital) appropriate mood and affect    LABS:                        12.2   4.88  )-----------( 199      ( 13 Dec 2022 07:16 )             37.3     12-13    138  |  108<H>  |  7   ----------------------------<  84  3.7   |  23  |  0.91    Ca    8.4      13 Dec 2022 07:16  Phos  3.4     12-13  Mg     2.30     12-13                  RADIOLOGY & ADDITIONAL TESTS:  Results Reviewed:   Imaging Personally Reviewed:  < from: Transthoracic Echocardiogram (12.12.22 @ 14:47) >  CONCLUSIONS:  LVEF 67%  1. Mitral annular calcification, otherwise normal mitral  valve. Mild mitral regurgitation.  2. Calcified trileaflet aortic valve with normal opening.  Mild aortic regurgitation.  3. Normal left ventricular internal dimensions and wall  thicknesses.  4. Endocardium not well visualized; grossly normal left  ventricular systolic function.  5. The right ventricle is not well visualized; grossly  normal right ventricular systolic function.  6. A bubble study was performed with the intravenous  injection of agitated saline.  Following contrast  injection, no obvious bubbles were seen in the left heart.        Electrocardiogram Personally Reviewed:    COORDINATION OF CARE:  Care Discussed with Consultants/Other Providers [Y/N]: laisha tidwell, expedite mri  Prior or Outpatient Records Reviewed [Y/N]:

## 2022-12-13 NOTE — PROGRESS NOTE ADULT - PROBLEM SELECTOR PLAN 4
- [id number] score = 5  - not on AC PTA (due to repeated falls, including w/ head trauma)  - holding home BB - [id number] score = 5  - not on AC PTA (due to repeated falls, including w/ head trauma)  - holding home BB - [id number] score = 5  - not on AC PTA (due to repeated falls, including w/ head trauma)  - holding home BB

## 2022-12-13 NOTE — PROGRESS NOTE ADULT - PROBLEM SELECTOR PLAN 1
- presented with reports of L-sided weakness and is s/p Stroke Code in the ED  - patient reports dizziness, including at rest, frequent falls - associated with head trauma as well  - passed dysphagia screen, diet ordered   - continue neurochecks, monitor tele   - HOB elevation.  Aspiration precautions.  Fall precautions  - neurology following, appreciate recommendations   -Echo w/ bubble study (12/12) normal LV/RV function, LVEF 67%, 1+MR, no PFO  [ ] expedite MR brain/cervical spine w/o IV contrast , daughter states no metal after surgery  - PT recs rehab, SLP recs regular diet

## 2022-12-13 NOTE — PROGRESS NOTE ADULT - PROBLEM SELECTOR PLAN 3
- low phos 1.8  - likely contributing toward fatigue/weakness  - supplementation prescribed; trend BMP, Mg, phos daily  - encourage po intake

## 2022-12-13 NOTE — PROGRESS NOTE ADULT - PROBLEM SELECTOR PLAN 6
# Gastric cancer s/p resection   Family reports poor PO intake since gastric surgery several years ago  has been losing weight since then, BMI 18  started ensure BID, nutrition eval  po thiamine, mvi  b12 996, folate 18.7, tsh 6.79, T4 6.83, A1c 5.7%

## 2022-12-14 LAB
ANION GAP SERPL CALC-SCNC: 8 MMOL/L — SIGNIFICANT CHANGE UP (ref 7–14)
BUN SERPL-MCNC: 10 MG/DL — SIGNIFICANT CHANGE UP (ref 7–23)
CALCIUM SERPL-MCNC: 8.5 MG/DL — SIGNIFICANT CHANGE UP (ref 8.4–10.5)
CHLORIDE SERPL-SCNC: 108 MMOL/L — HIGH (ref 98–107)
CO2 SERPL-SCNC: 26 MMOL/L — SIGNIFICANT CHANGE UP (ref 22–31)
CREAT SERPL-MCNC: 0.95 MG/DL — SIGNIFICANT CHANGE UP (ref 0.5–1.3)
EGFR: 77 ML/MIN/1.73M2 — SIGNIFICANT CHANGE UP
GLUCOSE SERPL-MCNC: 83 MG/DL — SIGNIFICANT CHANGE UP (ref 70–99)
HCT VFR BLD CALC: 37.2 % — LOW (ref 39–50)
HGB BLD-MCNC: 12.4 G/DL — LOW (ref 13–17)
MAGNESIUM SERPL-MCNC: 2.1 MG/DL — SIGNIFICANT CHANGE UP (ref 1.6–2.6)
MCHC RBC-ENTMCNC: 33.1 PG — SIGNIFICANT CHANGE UP (ref 27–34)
MCHC RBC-ENTMCNC: 33.3 GM/DL — SIGNIFICANT CHANGE UP (ref 32–36)
MCV RBC AUTO: 99.2 FL — SIGNIFICANT CHANGE UP (ref 80–100)
NRBC # BLD: 0 /100 WBCS — SIGNIFICANT CHANGE UP (ref 0–0)
NRBC # FLD: 0 K/UL — SIGNIFICANT CHANGE UP (ref 0–0)
PHOSPHATE SERPL-MCNC: 2.9 MG/DL — SIGNIFICANT CHANGE UP (ref 2.5–4.5)
PLATELET # BLD AUTO: 202 K/UL — SIGNIFICANT CHANGE UP (ref 150–400)
POTASSIUM SERPL-MCNC: 3.9 MMOL/L — SIGNIFICANT CHANGE UP (ref 3.5–5.3)
POTASSIUM SERPL-SCNC: 3.9 MMOL/L — SIGNIFICANT CHANGE UP (ref 3.5–5.3)
RBC # BLD: 3.75 M/UL — LOW (ref 4.2–5.8)
RBC # FLD: 14 % — SIGNIFICANT CHANGE UP (ref 10.3–14.5)
SODIUM SERPL-SCNC: 142 MMOL/L — SIGNIFICANT CHANGE UP (ref 135–145)
T3FREE SERPL-MCNC: 2.24 PG/ML — SIGNIFICANT CHANGE UP (ref 2–4.4)
WBC # BLD: 5.23 K/UL — SIGNIFICANT CHANGE UP (ref 3.8–10.5)
WBC # FLD AUTO: 5.23 K/UL — SIGNIFICANT CHANGE UP (ref 3.8–10.5)

## 2022-12-14 PROCEDURE — 99233 SBSQ HOSP IP/OBS HIGH 50: CPT

## 2022-12-14 RX ADMIN — TAMSULOSIN HYDROCHLORIDE 0.4 MILLIGRAM(S): 0.4 CAPSULE ORAL at 21:36

## 2022-12-14 RX ADMIN — Medication 1 CAPSULE(S): at 08:20

## 2022-12-14 RX ADMIN — ENOXAPARIN SODIUM 40 MILLIGRAM(S): 100 INJECTION SUBCUTANEOUS at 21:36

## 2022-12-14 RX ADMIN — Medication 100 MILLIGRAM(S): at 12:02

## 2022-12-14 RX ADMIN — Medication 1 CAPSULE(S): at 17:37

## 2022-12-14 RX ADMIN — SERTRALINE 25 MILLIGRAM(S): 25 TABLET, FILM COATED ORAL at 12:01

## 2022-12-14 RX ADMIN — Medication 1 CAPSULE(S): at 12:01

## 2022-12-14 RX ADMIN — Medication 81 MILLIGRAM(S): at 12:01

## 2022-12-14 RX ADMIN — Medication 1 TABLET(S): at 12:01

## 2022-12-14 RX ADMIN — PANTOPRAZOLE SODIUM 40 MILLIGRAM(S): 20 TABLET, DELAYED RELEASE ORAL at 05:39

## 2022-12-14 RX ADMIN — ATORVASTATIN CALCIUM 80 MILLIGRAM(S): 80 TABLET, FILM COATED ORAL at 21:36

## 2022-12-14 RX ADMIN — GABAPENTIN 300 MILLIGRAM(S): 400 CAPSULE ORAL at 12:01

## 2022-12-14 NOTE — PROGRESS NOTE ADULT - PROBLEM SELECTOR PLAN 4
- [id number] score = 5  - not on AC PTA (due to repeated falls, including w/ head trauma)  - holding home BB - [id number] score = 5  - not on AC PTA (due to repeated falls, including w/ head trauma)  - holding home BB - [id number] score = 5  - not on AC PTA (due to repeated falls, including w/ head trauma)  - holding home BB

## 2022-12-14 NOTE — PROGRESS NOTE ADULT - SUBJECTIVE AND OBJECTIVE BOX
Dr. Federica Horton  Pager [phone]    PROGRESS NOTE:     Patient is a 87y old  Male who presents with a chief complaint of Generalized weakness (13 Dec 2022 13:52)      SUBJECTIVE / OVERNIGHT EVENTS: denies chest pain or sob   ADDITIONAL REVIEW OF SYSTEMS: afebrile     MEDICATIONS  (STANDING):  aspirin  chewable 81 milliGRAM(s) Oral daily  atorvastatin 80 milliGRAM(s) Oral at bedtime  enoxaparin Injectable 40 milliGRAM(s) SubCutaneous every 24 hours  gabapentin 300 milliGRAM(s) Oral daily  multivitamin 1 Tablet(s) Oral daily  pancrelipase  (CREON 24,000 Lipase Units) 1 Capsule(s) Oral three times a day with meals  pantoprazole    Tablet 40 milliGRAM(s) Oral before breakfast  sertraline 25 milliGRAM(s) Oral daily  tamsulosin 0.4 milliGRAM(s) Oral at bedtime  thiamine 100 milliGRAM(s) Oral daily    MEDICATIONS  (PRN):  acetaminophen     Tablet .. 650 milliGRAM(s) Oral every 6 hours PRN Mild Pain (1 - 3), Moderate Pain (4 - 6)      CAPILLARY BLOOD GLUCOSE        I&O's Summary      PHYSICAL EXAM:  Vital Signs Last 24 Hrs  T(C): 36.6 (14 Dec 2022 11:21), Max: 36.9 (13 Dec 2022 16:00)  T(F): 97.9 (14 Dec 2022 11:21), Max: 98.5 (13 Dec 2022 16:00)  HR: 78 (14 Dec 2022 11:21) (67 - 143)  BP: 131/72 (14 Dec 2022 11:21) (110/65 - 138/84)  BP(mean): --  RR: 16 (14 Dec 2022 11:21) (16 - 18)  SpO2: 97% (14 Dec 2022 11:21) (97% - 100%)    Parameters below as of 14 Dec 2022 11:21  Patient On (Oxygen Delivery Method): room air      General: NAD, awake and alert  Eyes: PERRLA, conjunctiva clear, nonicteric sclera  HENMT: MMM  Respiratory: No respiratory distress, CTABL, No rales, rhonchi, wheezing.  Cardiovascular: S1,S2; Regular rate and rhythm; No m/g/r. 2+ peripheral pulses  Gastrointestinal: Soft, Nontender, Nondistended; +BS.   Extremities: No c/c/e; warm to touch  Neurological: Strength: CN Ii-XII intact. 5/5 bilateral handgrip, biceps, quadriceps hamstrings, calf. Sensation to LT grossly in tact in all 4 extremities.  Skin: No rashes, No erythema   Psych: AAOx2 (knows name and that he is in hospital) appropriate mood and affect    LABS:                        12.4   5.23  )-----------( 202      ( 14 Dec 2022 05:30 )             37.2     12-14    142  |  108<H>  |  10  ----------------------------<  83  3.9   |  26  |  0.95    Ca    8.5      14 Dec 2022 05:30  Phos  2.9     12-14  Mg     2.10     12-14                  RADIOLOGY & ADDITIONAL TESTS:  Results Reviewed:   Imaging Personally Reviewed:  Electrocardiogram Personally Reviewed:    COORDINATION OF CARE:  Care Discussed with Consultants/Other Providers [Y/N]: acp tin, mri  Prior or Outpatient Records Reviewed [Y/N]:   Dr. Federica Horton  Pager [phone]    PROGRESS NOTE:     Patient is a 87y old  Male who presents with a chief complaint of Generalized weakness (13 Dec 2022 13:52)      SUBJECTIVE / OVERNIGHT EVENTS: denies chest pain or sob   ADDITIONAL REVIEW OF SYSTEMS: afebrile     MEDICATIONS  (STANDING):  aspirin  chewable 81 milliGRAM(s) Oral daily  atorvastatin 80 milliGRAM(s) Oral at bedtime  enoxaparin Injectable 40 milliGRAM(s) SubCutaneous every 24 hours  gabapentin 300 milliGRAM(s) Oral daily  multivitamin 1 Tablet(s) Oral daily  pancrelipase  (CREON 24,000 Lipase Units) 1 Capsule(s) Oral three times a day with meals  pantoprazole    Tablet 40 milliGRAM(s) Oral before breakfast  sertraline 25 milliGRAM(s) Oral daily  tamsulosin 0.4 milliGRAM(s) Oral at bedtime  thiamine 100 milliGRAM(s) Oral daily    MEDICATIONS  (PRN):  acetaminophen     Tablet .. 650 milliGRAM(s) Oral every 6 hours PRN Mild Pain (1 - 3), Moderate Pain (4 - 6)      CAPILLARY BLOOD GLUCOSE        I&O's Summary      PHYSICAL EXAM:  Vital Signs Last 24 Hrs  T(C): 36.6 (14 Dec 2022 11:21), Max: 36.9 (13 Dec 2022 16:00)  T(F): 97.9 (14 Dec 2022 11:21), Max: 98.5 (13 Dec 2022 16:00)  HR: 78 (14 Dec 2022 11:21) (67 - 143)  BP: 131/72 (14 Dec 2022 11:21) (110/65 - 138/84)  BP(mean): --  RR: 16 (14 Dec 2022 11:21) (16 - 18)  SpO2: 97% (14 Dec 2022 11:21) (97% - 100%)    Parameters below as of 14 Dec 2022 11:21  Patient On (Oxygen Delivery Method): room air      General: NAD, awake and alert  Eyes: PERRLA, conjunctiva clear, nonicteric sclera  HENMT: MMM  Respiratory: No respiratory distress, CTABL, No rales, rhonchi, wheezing.  Cardiovascular: S1,S2; Regular rate and rhythm; No m/g/r. 2+ peripheral pulses  Gastrointestinal: Soft, Nontender, Nondistended; +BS.   Extremities: No c/c/e; warm to touch  Neurological: Strength: CN Ii-XII intact. 5/5 bilateral handgrip, biceps, quadriceps hamstrings, calf. Sensation to LT grossly in tact in all 4 extremities.  Skin: No rashes, No erythema   Psych: AAOx2 (knows name and that he is in hospital) appropriate mood and affect    LABS:                        12.4   5.23  )-----------( 202      ( 14 Dec 2022 05:30 )             37.2     12-14    142  |  108<H>  |  10  ----------------------------<  83  3.9   |  26  |  0.95    Ca    8.5      14 Dec 2022 05:30  Phos  2.9     12-14  Mg     2.10     12-14                  RADIOLOGY & ADDITIONAL TESTS:  Results Reviewed:   Imaging Personally Reviewed:  Electrocardiogram Personally Reviewed:    COORDINATION OF CARE:  Care Discussed with Consultants/Other Providers [Y/N]: acp tin, mri  Prior or Outpatient Records Reviewed [Y/N]:   Dr. Federica Horton  Pager [phone]    PROGRESS NOTE:     Patient is a 87y old  Male who presents with a chief complaint of Generalized weakness (13 Dec 2022 13:52)      SUBJECTIVE / OVERNIGHT EVENTS: denies chest pain or sob   ADDITIONAL REVIEW OF SYSTEMS: afebrile     MEDICATIONS  (STANDING):  aspirin  chewable 81 milliGRAM(s) Oral daily  atorvastatin 80 milliGRAM(s) Oral at bedtime  enoxaparin Injectable 40 milliGRAM(s) SubCutaneous every 24 hours  gabapentin 300 milliGRAM(s) Oral daily  multivitamin 1 Tablet(s) Oral daily  pancrelipase  (CREON 24,000 Lipase Units) 1 Capsule(s) Oral three times a day with meals  pantoprazole    Tablet 40 milliGRAM(s) Oral before breakfast  sertraline 25 milliGRAM(s) Oral daily  tamsulosin 0.4 milliGRAM(s) Oral at bedtime  thiamine 100 milliGRAM(s) Oral daily    MEDICATIONS  (PRN):  acetaminophen     Tablet .. 650 milliGRAM(s) Oral every 6 hours PRN Mild Pain (1 - 3), Moderate Pain (4 - 6)      CAPILLARY BLOOD GLUCOSE        I&O's Summary      PHYSICAL EXAM:  Vital Signs Last 24 Hrs  T(C): 36.6 (14 Dec 2022 11:21), Max: 36.9 (13 Dec 2022 16:00)  T(F): 97.9 (14 Dec 2022 11:21), Max: 98.5 (13 Dec 2022 16:00)  HR: 78 (14 Dec 2022 11:21) (67 - 143)  BP: 131/72 (14 Dec 2022 11:21) (110/65 - 138/84)  BP(mean): --  RR: 16 (14 Dec 2022 11:21) (16 - 18)  SpO2: 97% (14 Dec 2022 11:21) (97% - 100%)    Parameters below as of 14 Dec 2022 11:21  Patient On (Oxygen Delivery Method): room air      General: NAD, awake and alert  Eyes: PERRLA, conjunctiva clear, nonicteric sclera  HENMT: MMM  Respiratory: No respiratory distress, CTABL, No rales, rhonchi, wheezing.  Cardiovascular: S1,S2; Regular rate and rhythm; No m/g/r. 2+ peripheral pulses  Gastrointestinal: Soft, Nontender, Nondistended; +BS.   Extremities: No c/c/e; warm to touch  Neurological: Strength: CN Ii-XII intact. 5/5 bilateral handgrip, biceps, quadriceps hamstrings, calf. Sensation to LT grossly in tact in all 4 extremities.  Skin: No rashes, No erythema   Psych: AAOx2 (knows name and that he is in hospital) appropriate mood and affect    LABS:                        12.4   5.23  )-----------( 202      ( 14 Dec 2022 05:30 )             37.2     12-14    142  |  108<H>  |  10  ----------------------------<  83  3.9   |  26  |  0.95    Ca    8.5      14 Dec 2022 05:30  Phos  2.9     12-14  Mg     2.10     12-14                  RADIOLOGY & ADDITIONAL TESTS:  Results Reviewed:   Imaging Personally Reviewed:  Electrocardiogram Personally Reviewed:    COORDINATION OF CARE:  Care Discussed with Consultants/Other Providers [Y/N]: acp tin, mri  Prior or Outpatient Records Reviewed [Y/N]:

## 2022-12-15 LAB
ANION GAP SERPL CALC-SCNC: 9 MMOL/L — SIGNIFICANT CHANGE UP (ref 7–14)
BUN SERPL-MCNC: 16 MG/DL — SIGNIFICANT CHANGE UP (ref 7–23)
CALCIUM SERPL-MCNC: 8.4 MG/DL — SIGNIFICANT CHANGE UP (ref 8.4–10.5)
CHLORIDE SERPL-SCNC: 108 MMOL/L — HIGH (ref 98–107)
CO2 SERPL-SCNC: 25 MMOL/L — SIGNIFICANT CHANGE UP (ref 22–31)
CREAT SERPL-MCNC: 0.92 MG/DL — SIGNIFICANT CHANGE UP (ref 0.5–1.3)
EGFR: 81 ML/MIN/1.73M2 — SIGNIFICANT CHANGE UP
GLUCOSE SERPL-MCNC: 90 MG/DL — SIGNIFICANT CHANGE UP (ref 70–99)
HCT VFR BLD CALC: 38 % — LOW (ref 39–50)
HGB BLD-MCNC: 12.6 G/DL — LOW (ref 13–17)
MAGNESIUM SERPL-MCNC: 2.3 MG/DL — SIGNIFICANT CHANGE UP (ref 1.6–2.6)
MCHC RBC-ENTMCNC: 33.2 GM/DL — SIGNIFICANT CHANGE UP (ref 32–36)
MCHC RBC-ENTMCNC: 33.3 PG — SIGNIFICANT CHANGE UP (ref 27–34)
MCV RBC AUTO: 100.5 FL — HIGH (ref 80–100)
NRBC # BLD: 0 /100 WBCS — SIGNIFICANT CHANGE UP (ref 0–0)
NRBC # FLD: 0 K/UL — SIGNIFICANT CHANGE UP (ref 0–0)
PHOSPHATE SERPL-MCNC: 2.8 MG/DL — SIGNIFICANT CHANGE UP (ref 2.5–4.5)
PLATELET # BLD AUTO: 220 K/UL — SIGNIFICANT CHANGE UP (ref 150–400)
POTASSIUM SERPL-MCNC: 3.7 MMOL/L — SIGNIFICANT CHANGE UP (ref 3.5–5.3)
POTASSIUM SERPL-SCNC: 3.7 MMOL/L — SIGNIFICANT CHANGE UP (ref 3.5–5.3)
RBC # BLD: 3.78 M/UL — LOW (ref 4.2–5.8)
RBC # FLD: 13.9 % — SIGNIFICANT CHANGE UP (ref 10.3–14.5)
SODIUM SERPL-SCNC: 142 MMOL/L — SIGNIFICANT CHANGE UP (ref 135–145)
WBC # BLD: 5.91 K/UL — SIGNIFICANT CHANGE UP (ref 3.8–10.5)
WBC # FLD AUTO: 5.91 K/UL — SIGNIFICANT CHANGE UP (ref 3.8–10.5)

## 2022-12-15 PROCEDURE — 70551 MRI BRAIN STEM W/O DYE: CPT | Mod: 26

## 2022-12-15 PROCEDURE — 72141 MRI NECK SPINE W/O DYE: CPT | Mod: 26

## 2022-12-15 PROCEDURE — 99233 SBSQ HOSP IP/OBS HIGH 50: CPT

## 2022-12-15 RX ADMIN — ATORVASTATIN CALCIUM 80 MILLIGRAM(S): 80 TABLET, FILM COATED ORAL at 22:49

## 2022-12-15 RX ADMIN — GABAPENTIN 300 MILLIGRAM(S): 400 CAPSULE ORAL at 13:01

## 2022-12-15 RX ADMIN — Medication 100 MILLIGRAM(S): at 13:00

## 2022-12-15 RX ADMIN — Medication 81 MILLIGRAM(S): at 13:00

## 2022-12-15 RX ADMIN — Medication 1 CAPSULE(S): at 13:00

## 2022-12-15 RX ADMIN — TAMSULOSIN HYDROCHLORIDE 0.4 MILLIGRAM(S): 0.4 CAPSULE ORAL at 22:49

## 2022-12-15 RX ADMIN — Medication 1 CAPSULE(S): at 12:41

## 2022-12-15 RX ADMIN — Medication 1 CAPSULE(S): at 17:33

## 2022-12-15 RX ADMIN — Medication 1 TABLET(S): at 13:01

## 2022-12-15 RX ADMIN — ENOXAPARIN SODIUM 40 MILLIGRAM(S): 100 INJECTION SUBCUTANEOUS at 22:49

## 2022-12-15 RX ADMIN — SERTRALINE 25 MILLIGRAM(S): 25 TABLET, FILM COATED ORAL at 13:01

## 2022-12-15 RX ADMIN — PANTOPRAZOLE SODIUM 40 MILLIGRAM(S): 20 TABLET, DELAYED RELEASE ORAL at 05:11

## 2022-12-15 NOTE — PROGRESS NOTE ADULT - PROBLEM SELECTOR PLAN 4
- [id number] score = 5  - not on AC PTA (due to repeated falls, including w/ head trauma)  - holding home BB - [id number] score = 5  - not on AC PTA (due to repeated falls, including w/ head trauma)  - holding home BB - [id number] score = 5  - not on AC PTA (due to repeated falls, including w/ head trauma)  - holding home BB

## 2022-12-15 NOTE — PROGRESS NOTE ADULT - SUBJECTIVE AND OBJECTIVE BOX
Dr. Federica Horton  Pager [phone]    PROGRESS NOTE:     Patient is a 87y old  Male who presents with a chief complaint of Generalized weakness (14 Dec 2022 12:37)      SUBJECTIVE / OVERNIGHT EVENTS: denies chest pain or sob  ADDITIONAL REVIEW OF SYSTEMS: afebrile     MEDICATIONS  (STANDING):  aspirin  chewable 81 milliGRAM(s) Oral daily  atorvastatin 80 milliGRAM(s) Oral at bedtime  enoxaparin Injectable 40 milliGRAM(s) SubCutaneous every 24 hours  gabapentin 300 milliGRAM(s) Oral daily  multivitamin 1 Tablet(s) Oral daily  pancrelipase  (CREON 24,000 Lipase Units) 1 Capsule(s) Oral three times a day with meals  pantoprazole    Tablet 40 milliGRAM(s) Oral before breakfast  sertraline 25 milliGRAM(s) Oral daily  tamsulosin 0.4 milliGRAM(s) Oral at bedtime  thiamine 100 milliGRAM(s) Oral daily    MEDICATIONS  (PRN):  acetaminophen     Tablet .. 650 milliGRAM(s) Oral every 6 hours PRN Mild Pain (1 - 3), Moderate Pain (4 - 6)      CAPILLARY BLOOD GLUCOSE        I&O's Summary      PHYSICAL EXAM:  Vital Signs Last 24 Hrs  T(C): 36.4 (15 Dec 2022 05:10), Max: 36.9 (14 Dec 2022 20:27)  T(F): 97.6 (15 Dec 2022 05:10), Max: 98.4 (14 Dec 2022 20:27)  HR: 63 (15 Dec 2022 05:10) (63 - 70)  BP: 106/60 (15 Dec 2022 05:10) (106/60 - 124/76)  BP(mean): --  RR: 17 (15 Dec 2022 05:10) (16 - 18)  SpO2: 98% (15 Dec 2022 05:10) (98% - 100%)    Parameters below as of 15 Dec 2022 05:10  Patient On (Oxygen Delivery Method): room air    General: NAD, awake and alert  Eyes: PERRLA, conjunctiva clear, nonicteric sclera  HENMT: MMM  Respiratory: No respiratory distress, CTABL, No rales, rhonchi, wheezing.  Cardiovascular: S1,S2; Regular rate and rhythm; No m/g/r. 2+ peripheral pulses  Gastrointestinal: Soft, Nontender, Nondistended; +BS.   Extremities: No c/c/e; warm to touch  Neurological: Strength: CN Ii-XII intact. 5/5 bilateral handgrip, biceps, quadriceps hamstrings, calf. Sensation to LT grossly in tact in all 4 extremities.  Skin: No rashes, No erythema   Psych: AAOx2 (knows name and that he is in hospital) appropriate mood and affect      LABS:                        12.6   5.91  )-----------( 220      ( 15 Dec 2022 05:00 )             38.0     12-15    142  |  108<H>  |  16  ----------------------------<  90  3.7   |  25  |  0.92    Ca    8.4      15 Dec 2022 05:00  Phos  2.8     12-15  Mg     2.30     12-15          RADIOLOGY & ADDITIONAL TESTS:  Results Reviewed:   Imaging Personally Reviewed:  < from: MR Cervical Spine No Cont (12.15.22 @ 12:22) >    IMPRESSION:  Multilevel degenerative changes as described. No significant   central stenosis. Multilevel foraminal narrowing on the basis of   uncovertebral jointdegenerative change. No cord pathology    < from: MR Head No Cont (12.15.22 @ 12:15) >    IMPRESSION:  Findings favor the presence of hemorrhagic infarct in the right frontal   parasagittal region with some scattered areas of diffusion hyperintensity   also in the right high NAIMA territory more peripherally. However, contrast   study is recommended to exclude less likely possibility of an underlying   hemorrhagic mass lesion though again acute infarct with hemorrhage is   favored. Left frontal dural based soft tissue lesion without significant   mass effect or edema consistent with a meningioma is appreciated.    Electrocardiogram Personally Reviewed:    COORDINATION OF CARE:  Care Discussed with Consultants/Other Providers [Y/N]: acp tin, Expedite mri; neuro team Ollie, mri reviewed, cont asa for now  Prior or Outpatient Records Reviewed [Y/N]:   Dr. Federica Horton  Pager [phone]    PROGRESS NOTE:     Patient is a 87y old  Male who presents with a chief complaint of Generalized weakness (14 Dec 2022 12:37)      SUBJECTIVE / OVERNIGHT EVENTS: denies chest pain or sob  ADDITIONAL REVIEW OF SYSTEMS: afebrile     MEDICATIONS  (STANDING):  aspirin  chewable 81 milliGRAM(s) Oral daily  atorvastatin 80 milliGRAM(s) Oral at bedtime  enoxaparin Injectable 40 milliGRAM(s) SubCutaneous every 24 hours  gabapentin 300 milliGRAM(s) Oral daily  multivitamin 1 Tablet(s) Oral daily  pancrelipase  (CREON 24,000 Lipase Units) 1 Capsule(s) Oral three times a day with meals  pantoprazole    Tablet 40 milliGRAM(s) Oral before breakfast  sertraline 25 milliGRAM(s) Oral daily  tamsulosin 0.4 milliGRAM(s) Oral at bedtime  thiamine 100 milliGRAM(s) Oral daily    MEDICATIONS  (PRN):  acetaminophen     Tablet .. 650 milliGRAM(s) Oral every 6 hours PRN Mild Pain (1 - 3), Moderate Pain (4 - 6)      CAPILLARY BLOOD GLUCOSE        I&O's Summary      PHYSICAL EXAM:  Vital Signs Last 24 Hrs  T(C): 36.4 (15 Dec 2022 05:10), Max: 36.9 (14 Dec 2022 20:27)  T(F): 97.6 (15 Dec 2022 05:10), Max: 98.4 (14 Dec 2022 20:27)  HR: 63 (15 Dec 2022 05:10) (63 - 70)  BP: 106/60 (15 Dec 2022 05:10) (106/60 - 124/76)  BP(mean): --  RR: 17 (15 Dec 2022 05:10) (16 - 18)  SpO2: 98% (15 Dec 2022 05:10) (98% - 100%)    Parameters below as of 15 Dec 2022 05:10  Patient On (Oxygen Delivery Method): room air    General: NAD, awake and alert  Eyes: PERRLA, conjunctiva clear, nonicteric sclera  HENMT: MMM  Respiratory: No respiratory distress, CTABL, No rales, rhonchi, wheezing.  Cardiovascular: S1,S2; Regular rate and rhythm; No m/g/r. 2+ peripheral pulses  Gastrointestinal: Soft, Nontender, Nondistended; +BS.   Extremities: No c/c/e; warm to touch  Neurological: Strength: CN Ii-XII intact. 5/5 bilateral handgrip, biceps, quadriceps hamstrings, calf. Sensation to LT grossly in tact in all 4 extremities.  Skin: No rashes, No erythema   Psych: AAOx2 (knows name and that he is in hospital) appropriate mood and affect      LABS:                        12.6   5.91  )-----------( 220      ( 15 Dec 2022 05:00 )             38.0     12-15    142  |  108<H>  |  16  ----------------------------<  90  3.7   |  25  |  0.92    Ca    8.4      15 Dec 2022 05:00  Phos  2.8     12-15  Mg     2.30     12-15          RADIOLOGY & ADDITIONAL TESTS:  Results Reviewed:   Imaging Personally Reviewed:  < from: MR Cervical Spine No Cont (12.15.22 @ 12:22) >    IMPRESSION:  Multilevel degenerative changes as described. No significant   central stenosis. Multilevel foraminal narrowing on the basis of   uncovertebral jointdegenerative change. No cord pathology    < from: MR Head No Cont (12.15.22 @ 12:15) >    IMPRESSION:  Findings favor the presence of hemorrhagic infarct in the right frontal   parasagittal region with some scattered areas of diffusion hyperintensity   also in the right high NAIMA territory more peripherally. However, contrast   study is recommended to exclude less likely possibility of an underlying   hemorrhagic mass lesion though again acute infarct with hemorrhage is   favored. Left frontal dural based soft tissue lesion without significant   mass effect or edema consistent with a meningioma is appreciated.    Electrocardiogram Personally Reviewed:    COORDINATION OF CARE:  Care Discussed with Consultants/Other Providers [Y/N]: acp tin, Expedite mri; neuro team Ollie, mri reviewed, cont asa for now  Prior or Outpatient Records Reviewed [Y/N]:   Dr. Federica Horton  Pager [phone]    PROGRESS NOTE:     Patient is a 87y old  Male who presents with a chief complaint of Generalized weakness (14 Dec 2022 12:37)      SUBJECTIVE / OVERNIGHT EVENTS: denies chest pain or sob  ADDITIONAL REVIEW OF SYSTEMS: afebrile     MEDICATIONS  (STANDING):  aspirin  chewable 81 milliGRAM(s) Oral daily  atorvastatin 80 milliGRAM(s) Oral at bedtime  enoxaparin Injectable 40 milliGRAM(s) SubCutaneous every 24 hours  gabapentin 300 milliGRAM(s) Oral daily  multivitamin 1 Tablet(s) Oral daily  pancrelipase  (CREON 24,000 Lipase Units) 1 Capsule(s) Oral three times a day with meals  pantoprazole    Tablet 40 milliGRAM(s) Oral before breakfast  sertraline 25 milliGRAM(s) Oral daily  tamsulosin 0.4 milliGRAM(s) Oral at bedtime  thiamine 100 milliGRAM(s) Oral daily    MEDICATIONS  (PRN):  acetaminophen     Tablet .. 650 milliGRAM(s) Oral every 6 hours PRN Mild Pain (1 - 3), Moderate Pain (4 - 6)      CAPILLARY BLOOD GLUCOSE        I&O's Summary      PHYSICAL EXAM:  Vital Signs Last 24 Hrs  T(C): 36.4 (15 Dec 2022 05:10), Max: 36.9 (14 Dec 2022 20:27)  T(F): 97.6 (15 Dec 2022 05:10), Max: 98.4 (14 Dec 2022 20:27)  HR: 63 (15 Dec 2022 05:10) (63 - 70)  BP: 106/60 (15 Dec 2022 05:10) (106/60 - 124/76)  BP(mean): --  RR: 17 (15 Dec 2022 05:10) (16 - 18)  SpO2: 98% (15 Dec 2022 05:10) (98% - 100%)    Parameters below as of 15 Dec 2022 05:10  Patient On (Oxygen Delivery Method): room air    General: NAD, awake and alert  Eyes: PERRLA, conjunctiva clear, nonicteric sclera  HENMT: MMM  Respiratory: No respiratory distress, CTABL, No rales, rhonchi, wheezing.  Cardiovascular: S1,S2; Regular rate and rhythm; No m/g/r. 2+ peripheral pulses  Gastrointestinal: Soft, Nontender, Nondistended; +BS.   Extremities: No c/c/e; warm to touch  Neurological: Strength: CN Ii-XII intact. 5/5 bilateral handgrip, biceps, quadriceps hamstrings, calf. Sensation to LT grossly in tact in all 4 extremities.  Skin: No rashes, No erythema   Psych: AAOx2 (knows name and that he is in hospital) appropriate mood and affect      LABS:                        12.6   5.91  )-----------( 220      ( 15 Dec 2022 05:00 )             38.0     12-15    142  |  108<H>  |  16  ----------------------------<  90  3.7   |  25  |  0.92    Ca    8.4      15 Dec 2022 05:00  Phos  2.8     12-15  Mg     2.30     12-15          RADIOLOGY & ADDITIONAL TESTS:  Results Reviewed:   Imaging Personally Reviewed:  < from: MR Cervical Spine No Cont (12.15.22 @ 12:22) >    IMPRESSION:  Multilevel degenerative changes as described. No significant   central stenosis. Multilevel foraminal narrowing on the basis of   uncovertebral jointdegenerative change. No cord pathology    < from: MR Head No Cont (12.15.22 @ 12:15) >    IMPRESSION:  Findings favor the presence of hemorrhagic infarct in the right frontal   parasagittal region with some scattered areas of diffusion hyperintensity   also in the right high NAIMA territory more peripherally. However, contrast   study is recommended to exclude less likely possibility of an underlying   hemorrhagic mass lesion though again acute infarct with hemorrhage is   favored. Left frontal dural based soft tissue lesion without significant   mass effect or edema consistent with a meningioma is appreciated.    Electrocardiogram Personally Reviewed:    COORDINATION OF CARE:  Care Discussed with Consultants/Other Providers [Y/N]: acp tin, Expedite mri; neuro team Ollie, mri reviewed, cont asa for now  Prior or Outpatient Records Reviewed [Y/N]:

## 2022-12-15 NOTE — PROGRESS NOTE ADULT - PROBLEM SELECTOR PLAN 1
- presented with reports of L-sided weakness and is s/p Stroke Code in the ED  - patient reports dizziness, including at rest, frequent falls - associated with head trauma as well  - passed dysphagia screen, diet ordered   - continue neuro checks, monitor tele   - FOUND TO HAVE ACUTE INFARCT  MRI c-spine (12/15) multilevel djd of c-spine and MRI brain favor the presence of hemorrhagic infarct in the right frontal parasagittal region with some scattered areas of diffusion hyperintensity also in the right high NAIMA territory more peripherally. However, contrast study is recommended to exclude less likely possibility of an underlying hemorrhagic mass lesion though again acute infarct with hemorrhage is favored. Left frontal dural based soft tissue lesion without significant   mass effect or edema consistent with a meningioma is appreciated.  - Need neurology follow up. I informed neuro stroke team, f/u recs, may consider contrast MRI Brain, for now okay to continue ASA  -Echo w/ bubble study (12/12) normal LV/RV function, LVEF 67%, 1+MR, no PFO  - PT recs MARIAELENA, SLP recs regular diet  - left message for pt's daughter on 12/15 - presented with reports of L-sided weakness and is s/p Stroke Code in the ED  - patient reports dizziness, including at rest, frequent falls - associated with head trauma as well  - passed dysphagia screen, diet ordered   - continue neuro checks, monitor tele   - FOUND TO HAVE ACUTE INFARCT  MRI c-spine (12/15) multilevel djd of c-spine and MRI brain favor the presence of hemorrhagic infarct in the right frontal parasagittal region with some scattered areas of diffusion hyperintensity also in the right high NAIMA territory more peripherally. However, contrast study is recommended to exclude less likely possibility of an underlying hemorrhagic mass lesion though again acute infarct with hemorrhage is favored. Left frontal dural based soft tissue lesion without significant   mass effect or edema consistent with a meningioma is appreciated.  - Need neurology follow up. I discussed with neuro stroke team, f/u recs, may consider contrast MRI Brain, for now okay to continue ASA  -Echo w/ bubble study (12/12) normal LV/RV function, LVEF 67%, 1+MR, no PFO  - PT recs MARIAELENA, SLP recs regular diet  - left message for pt's daughter on 12/15 - presented with reports of L-sided weakness and is s/p Stroke Code in the ED  - patient reports dizziness, including at rest, frequent falls - associated with head trauma as well  - passed dysphagia screen, diet ordered   - continue neuro checks, monitor tele   - FOUND TO HAVE ACUTE INFARCT  MRI c-spine (12/15) multilevel djd of c-spine and MRI brain favor the presence of hemorrhagic infarct in the right frontal parasagittal region with some scattered areas of diffusion hyperintensity also in the right high NAIMA territory more peripherally. However, contrast study is recommended to exclude less likely possibility of an underlying hemorrhagic mass lesion though again acute infarct with hemorrhage is favored. Left frontal dural based soft tissue lesion without significant   mass effect or edema consistent with a meningioma is appreciated.  - Need neurology follow up. I discussed with neuro stroke team, f/u recs, may consider contrast MRI Brain, for now okay to continue ASA  -Echo w/ bubble study (12/12) normal LV/RV function, LVEF 67%, 1+MR, no PFO  - PT recs MARIAELENA, SLP recs regular diet  -Updated pt's christinaer on 12/15 - presented with reports of L-sided weakness and is s/p Stroke Code in the ED  - patient reports dizziness, including at rest, frequent falls - associated with head trauma as well  - passed dysphagia screen, diet ordered   - continue neuro checks, monitor tele   - FOUND TO HAVE ACUTE INFARCT  MRI c-spine (12/15) multilevel djd of c-spine and MRI brain favor the presence of hemorrhagic infarct in the right frontal parasagittal region with some scattered areas of diffusion hyperintensity also in the right high NAIMA territory more peripherally. However, contrast study is recommended to exclude less likely possibility of an underlying hemorrhagic mass lesion though again acute infarct with hemorrhage is favored. Left frontal dural based soft tissue lesion without significant   mass effect or edema consistent with a meningioma is appreciated.  - Need neurology follow up. I discussed with neuro stroke team, f/u recs, may consider contrast MRI Brain, for now okay to continue ASA  -Echo w/ bubble study (12/12) normal LV/RV function, LVEF 67%, 1+MR, no PFO  - PT recs MARIAELENA, SLP recs regular diet  -Updated pt's daughter on 12/15, she's agreeable with rehab

## 2022-12-15 NOTE — PROGRESS NOTE ADULT - PROBLEM SELECTOR PLAN 3
- likely contributing toward fatigue/weakness  - supplementation prescribed; trend BMP, Mg, phos daily  - encourage po intake

## 2022-12-16 ENCOUNTER — TRANSCRIPTION ENCOUNTER (OUTPATIENT)
Age: 87
End: 2022-12-16

## 2022-12-16 LAB
ANION GAP SERPL CALC-SCNC: 10 MMOL/L — SIGNIFICANT CHANGE UP (ref 7–14)
BUN SERPL-MCNC: 19 MG/DL — SIGNIFICANT CHANGE UP (ref 7–23)
CALCIUM SERPL-MCNC: 8.6 MG/DL — SIGNIFICANT CHANGE UP (ref 8.4–10.5)
CHLORIDE SERPL-SCNC: 105 MMOL/L — SIGNIFICANT CHANGE UP (ref 98–107)
CO2 SERPL-SCNC: 24 MMOL/L — SIGNIFICANT CHANGE UP (ref 22–31)
CREAT SERPL-MCNC: 0.97 MG/DL — SIGNIFICANT CHANGE UP (ref 0.5–1.3)
EGFR: 76 ML/MIN/1.73M2 — SIGNIFICANT CHANGE UP
GLUCOSE SERPL-MCNC: 87 MG/DL — SIGNIFICANT CHANGE UP (ref 70–99)
HCT VFR BLD CALC: 38.7 % — LOW (ref 39–50)
HGB BLD-MCNC: 12.6 G/DL — LOW (ref 13–17)
MAGNESIUM SERPL-MCNC: 2 MG/DL — SIGNIFICANT CHANGE UP (ref 1.6–2.6)
MCHC RBC-ENTMCNC: 32.6 GM/DL — SIGNIFICANT CHANGE UP (ref 32–36)
MCHC RBC-ENTMCNC: 32.9 PG — SIGNIFICANT CHANGE UP (ref 27–34)
MCV RBC AUTO: 101 FL — HIGH (ref 80–100)
NRBC # BLD: 0 /100 WBCS — SIGNIFICANT CHANGE UP (ref 0–0)
NRBC # FLD: 0 K/UL — SIGNIFICANT CHANGE UP (ref 0–0)
PHOSPHATE SERPL-MCNC: 2.9 MG/DL — SIGNIFICANT CHANGE UP (ref 2.5–4.5)
PLATELET # BLD AUTO: 206 K/UL — SIGNIFICANT CHANGE UP (ref 150–400)
POTASSIUM SERPL-MCNC: 3.3 MMOL/L — LOW (ref 3.5–5.3)
POTASSIUM SERPL-SCNC: 3.3 MMOL/L — LOW (ref 3.5–5.3)
RBC # BLD: 3.83 M/UL — LOW (ref 4.2–5.8)
RBC # FLD: 14.2 % — SIGNIFICANT CHANGE UP (ref 10.3–14.5)
SODIUM SERPL-SCNC: 139 MMOL/L — SIGNIFICANT CHANGE UP (ref 135–145)
WBC # BLD: 5.14 K/UL — SIGNIFICANT CHANGE UP (ref 3.8–10.5)
WBC # FLD AUTO: 5.14 K/UL — SIGNIFICANT CHANGE UP (ref 3.8–10.5)

## 2022-12-16 PROCEDURE — 99232 SBSQ HOSP IP/OBS MODERATE 35: CPT

## 2022-12-16 PROCEDURE — 99233 SBSQ HOSP IP/OBS HIGH 50: CPT

## 2022-12-16 RX ORDER — POTASSIUM CHLORIDE 20 MEQ
40 PACKET (EA) ORAL EVERY 4 HOURS
Refills: 0 | Status: COMPLETED | OUTPATIENT
Start: 2022-12-16 | End: 2022-12-16

## 2022-12-16 RX ORDER — POTASSIUM CHLORIDE 20 MEQ
20 PACKET (EA) ORAL
Refills: 0 | Status: DISCONTINUED | OUTPATIENT
Start: 2022-12-16 | End: 2022-12-16

## 2022-12-16 RX ADMIN — TAMSULOSIN HYDROCHLORIDE 0.4 MILLIGRAM(S): 0.4 CAPSULE ORAL at 22:12

## 2022-12-16 RX ADMIN — Medication 1 CAPSULE(S): at 08:47

## 2022-12-16 RX ADMIN — PANTOPRAZOLE SODIUM 40 MILLIGRAM(S): 20 TABLET, DELAYED RELEASE ORAL at 05:15

## 2022-12-16 RX ADMIN — GABAPENTIN 300 MILLIGRAM(S): 400 CAPSULE ORAL at 12:01

## 2022-12-16 RX ADMIN — Medication 100 MILLIGRAM(S): at 12:02

## 2022-12-16 RX ADMIN — ATORVASTATIN CALCIUM 80 MILLIGRAM(S): 80 TABLET, FILM COATED ORAL at 22:12

## 2022-12-16 RX ADMIN — Medication 1 CAPSULE(S): at 17:55

## 2022-12-16 RX ADMIN — Medication 1 TABLET(S): at 12:02

## 2022-12-16 RX ADMIN — ENOXAPARIN SODIUM 40 MILLIGRAM(S): 100 INJECTION SUBCUTANEOUS at 22:12

## 2022-12-16 RX ADMIN — SERTRALINE 25 MILLIGRAM(S): 25 TABLET, FILM COATED ORAL at 12:02

## 2022-12-16 RX ADMIN — Medication 81 MILLIGRAM(S): at 12:01

## 2022-12-16 RX ADMIN — Medication 1 CAPSULE(S): at 12:01

## 2022-12-16 RX ADMIN — Medication 40 MILLIEQUIVALENT(S): at 11:59

## 2022-12-16 NOTE — PROGRESS NOTE ADULT - ASSESSMENT
Patient CP is a 86yo R-handed M PMHx irregular heart rhythm, BPH, possible dementia, stomach cancer, meningioma recent ED visit [facility] for fall, who presents to ED for 2x falls then found with L sided weakness. Daughter Freedom at bedside states LKW 7AM 12/9 when she last saw him. He remained with other family members during the day and was found after a fall after which he had L sided weakness. He had another fall and they noted that he had "twisted" his legs. Not on ac/ap agents. Of note, presented to [place] ED 11/26/22 for fall, and CT c spine neg for fractures. CT head showed small dural structure L frontal region possibly meningioma versus focal subdural hematoma that cannot be excluded. Also had focal cerebellar infarcts and b/l lacunar infarcts in/adjacent to BG of indeterminate age per the report family provided.     Impression: L hemiparesis 2/2 right brain dysfunction. MRI showing possible lesion vs mass with edema in the Right frontal parasaggital region. Left frontal likely meningioma     Recommendations:  [] OBtain MRI brain with and without contrast. This can be done as o/p  [] Hold asa until MRI brain w is concluded   [] consider as if no mass noted on mri   [] NS consult to review MRI. Recs appreciated     Patient can follow up with general neurology at [address] [place] [address] 1-2 weeks after discharge. Please instruct the patient to call [phone] to schedule this appointment.     Case seen by Attending.    Patient CP is a 88yo R-handed M PMHx irregular heart rhythm, BPH, possible dementia, stomach cancer, meningioma recent ED visit [facility] for fall, who presents to ED for 2x falls then found with L sided weakness. Daughter Freedom at bedside states LKW 7AM 12/9 when she last saw him. He remained with other family members during the day and was found after a fall after which he had L sided weakness. He had another fall and they noted that he had "twisted" his legs. Not on ac/ap agents. Of note, presented to [place] ED 11/26/22 for fall, and CT c spine neg for fractures. CT head showed small dural structure L frontal region possibly meningioma versus focal subdural hematoma that cannot be excluded. Also had focal cerebellar infarcts and b/l lacunar infarcts in/adjacent to BG of indeterminate age per the report family provided.     Impression: L hemiparesis 2/2 right brain dysfunction. MRI showing possible lesion vs mass with edema in the Right frontal parasaggital region. Left frontal likely meningioma     Recommendations:  [] OBtain MRI brain with and without contrast. This can be done as o/p  [] Hold asa until MRI brain w is concluded   [] consider as if no mass noted on mri   [] NS consult to review MRI. Recs appreciated     Patient can follow up with general neurology at [address] [place] [address] 1-2 weeks after discharge. Please instruct the patient to call [phone] to schedule this appointment.     Case seen by Attending.    Patient CP is a 86yo R-handed M PMHx irregular heart rhythm, BPH, possible dementia, stomach cancer, meningioma recent ED visit [facility] for fall, who presents to ED for 2x falls then found with L sided weakness. Daughter Freedom at bedside states LKW 7AM 12/9 when she last saw him. He remained with other family members during the day and was found after a fall after which he had L sided weakness. He had another fall and they noted that he had "twisted" his legs. Not on ac/ap agents. Of note, presented to [place] ED 11/26/22 for fall, and CT c spine neg for fractures. CT head showed small dural structure L frontal region possibly meningioma versus focal subdural hematoma that cannot be excluded. Also had focal cerebellar infarcts and b/l lacunar infarcts in/adjacent to BG of indeterminate age per the report family provided.     Impression: L hemiparesis 2/2 right brain dysfunction. MRI showing possible lesion vs mass with edema in the Right frontal parasaggital region. Left frontal likely meningioma     Recommendations:  [] OBtain MRI brain with and without contrast. This can be done as o/p  [] Hold asa until MRI brain w is concluded   [] consider as if no mass noted on mri   [] NS consult to review MRI. Recs appreciated     Patient can follow up with general neurology at [address] [place] [address] 1-2 weeks after discharge. Please instruct the patient to call [phone] to schedule this appointment.     Case seen by Attending.    Patient CP is a 86yo R-handed M PMHx irregular heart rhythm, BPH, possible dementia, stomach cancer, meningioma recent ED visit [facility] for fall, who presents to ED for 2x falls then found with L sided weakness. Daughter Freedom at bedside states LKW 7AM 12/9 when she last saw him. He remained with other family members during the day and was found after a fall after which he had L sided weakness. He had another fall and they noted that he had "twisted" his legs. Not on ac/ap agents. Of note, presented to [place] ED 11/26/22 for fall, and CT c spine neg for fractures. CT head showed small dural structure L frontal region possibly meningioma versus focal subdural hematoma that cannot be excluded. Also had focal cerebellar infarcts and b/l lacunar infarcts in/adjacent to BG of indeterminate age per the report family provided.     Impression: L hemiparesis 2/2 right brain dysfunction. MRI showing possible lesion vs mass with edema in the Right frontal parasaggital region. Left frontal likely meningioma     Recommendations:  [] MRI brain with and without contrast. This can be done as o/p  [] Hold asa until MRI brain w is concluded   [] consider as if no mass noted on mri   [] NS consult to review MRI. Recs appreciated     Patient can follow up with general neurology at [address] [place] [address] 1-2 weeks after discharge. Please instruct the patient to call [phone] to schedule this appointment.     Case seen by Attending.    Patient CP is a 86yo R-handed M PMHx irregular heart rhythm, BPH, possible dementia, stomach cancer, meningioma recent ED visit [facility] for fall, who presents to ED for 2x falls then found with L sided weakness. Daughter Freedom at bedside states LKW 7AM 12/9 when she last saw him. He remained with other family members during the day and was found after a fall after which he had L sided weakness. He had another fall and they noted that he had "twisted" his legs. Not on ac/ap agents. Of note, presented to [place] ED 11/26/22 for fall, and CT c spine neg for fractures. CT head showed small dural structure L frontal region possibly meningioma versus focal subdural hematoma that cannot be excluded. Also had focal cerebellar infarcts and b/l lacunar infarcts in/adjacent to BG of indeterminate age per the report family provided.     Impression: L hemiparesis 2/2 right brain dysfunction. MRI showing possible lesion vs mass with edema in the Right frontal parasaggital region. Left frontal likely meningioma     Recommendations:  [] MRI brain with and without contrast. This can be done as o/p  [] Hold asa until MRI brain w is concluded   [] consider as if no mass noted on mri   [] NS consult to review MRI. Recs appreciated     Patient can follow up with general neurology at [address] [place] [address] 1-2 weeks after discharge. Please instruct the patient to call [phone] to schedule this appointment.     Case seen by Attending.    Patient CP is a 86yo R-handed M PMHx irregular heart rhythm, BPH, possible dementia, stomach cancer, meningioma recent ED visit [facility] for fall, who presents to ED for 2x falls then found with L sided weakness. Daughter Freedom at bedside states LKW 7AM 12/9 when she last saw him. He remained with other family members during the day and was found after a fall after which he had L sided weakness. He had another fall and they noted that he had "twisted" his legs. Not on ac/ap agents. Of note, presented to [place] ED 11/26/22 for fall, and CT c spine neg for fractures. CT head showed small dural structure L frontal region possibly meningioma versus focal subdural hematoma that cannot be excluded. Also had focal cerebellar infarcts and b/l lacunar infarcts in/adjacent to BG of indeterminate age per the report family provided.     Impression: L hemiparesis 2/2 right brain dysfunction. MRI showing possible lesion vs mass with edema in the Right frontal parasaggital region. Left frontal likely meningioma     Recommendations:  [] MRI brain with and without contrast. This can be done as o/p  [] Hold asa until MRI brain w is concluded   [] consider as if no mass noted on mri   [] NS consult to review MRI. Recs appreciated     Patient can follow up with general neurology at [address] [place] [address] 1-2 weeks after discharge. Please instruct the patient to call [phone] to schedule this appointment.     Case seen by Attending.

## 2022-12-16 NOTE — CONSULT NOTE ADULT - ASSESSMENT
86yo R-handed M PMHx irregular heart rhythm, BPH, possible dementia, stomach cancer, meningioma recent ED visit [facility] for fall, who presents to ED for 2x falls then found with L sided weakness. He remained with other family members during the day and was found after a fall after which he had L sided weakness. He had another fall and they noted that he had "twisted" his legs. Not on ac/ap agents. Of note, presented to [place] ED 11/26/22 for fall, and CT c spine neg for fractures. CT head showed small dural structure L frontal region possibly meningioma versus focal subdural hematoma that cannot be excluded. Also had focal cerebellar infarcts and b/l lacunar infarcts in/adjacent to BG of indeterminate age per the report family provided.     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 for fall, who presents to ED for 2x falls then found with L sided weakness. He remained with other family members during the day and was found after a fall after which he had L sided weakness. He had another fall and they noted that he had "twisted" his legs. Not on ac/ap agents. Of note, presented to [place] ED 11/26/22 for fall, and CT c spine neg for fractures. CT head showed small dural structure L frontal region possibly meningioma versus focal subdural hematoma that cannot be excluded. Also had focal cerebellar infarcts and b/l lacunar infarcts in/adjacent to BG of indeterminate age per the report family provided.     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 He remained with other family members during the day and was found after a fall after which he had L sided weakness. He had another fall and they noted that he had "twisted" his legs. Not on ac/ap agents. Of note, presented to [place] ED 11/26/22 for fall, and CT c spine neg for fractures. CT head showed small dural structure L frontal region possibly meningioma versus focal subdural hematoma that cannot be excluded. Also had focal cerebellar infarcts and b/l lacunar infarcts in/adjacent to BG of indeterminate age per the report family provided.     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

## 2022-12-16 NOTE — PROGRESS NOTE ADULT - SUBJECTIVE AND OBJECTIVE BOX
*************************************  NEUROLOGY PROGRESS NOTE  **************************************    CARMINE RHODES  Male  [id number]    Subjective: Pt seen at bedside. no acute complaints      VITAL SIGNS:  Vital Signs Last 24 Hrs  T(C): 36.8 (16 Dec 2022 11:08), Max: 36.9 (15 Dec 2022 16:39)  T(F): 98.3 (16 Dec 2022 11:08), Max: 98.5 (15 Dec 2022 16:39)  HR: 68 (16 Dec 2022 11:08) (63 - 76)  BP: 98/65 (16 Dec 2022 11:08) (98/65 - 117/72)  BP(mean): --  RR: 18 (16 Dec 2022 11:08) (17 - 18)  SpO2: 96% (16 Dec 2022 11:08) (95% - 98%)    Parameters below as of 16 Dec 2022 11:08  Patient On (Oxygen Delivery Method): room air        PHYSICAL EXAMINATION:  General: Well-developed, well nourished, in no acute distress.  Eyes: Conjunctiva and sclera clear.  Neck: supple, nontender, good ROM  Lungs: no respiratory distress  Neurologic:  - Mental Status:  Alert, awake, oriented to person, place, and time; Speech is fluent with intact naming, repetition, and comprehension but limited due to language barrier   - Cranial Nerves II-XII:  VFF, No nystagmus or APD noted, EOMI, PERRLA, V1-V3 intact, no facial asymmetry, t/p midline, SCM/trap intact.  - Motor: reduced muscle bulk and mildly diffusely increased tone throughout. Mild LUE proximal drift      L/R         Deltoid  4+/5    Biceps   5/5      Triceps  4+/5         Wrist Extension 5/5   Wrist Flexion  5/5      5/5   L/R         Hip Flexion  3/5    Knee Extension  5/5  Dorsiflexion  5/5      Plantar Flexion 5/5     - Sensory:  Intact to light touch, pin prick, vibration, and joint-position sense throughout.  - Coordination:  Finger-nose-finger and heel-knee-shin intact without dysmetria.  Rapid alternating hand movements intact.    LABS:                          12.6   5.14  )-----------( 206      ( 16 Dec 2022 05:55 )             38.7     12-16    139  |  105  |  19  ----------------------------<  87  3.3<L>   |  24  |  0.97    Ca    8.6      16 Dec 2022 05:55  Phos  2.9     12-16  Mg     2.00     12-16      RADIOLOGY & ADDITIONAL STUDIES:      < from: CT Angio Neck Stroke Protocol w/ IV Cont (12.09.22 @ 19:54) >    ACC: [id number] EXAM:  CT ANGIO BRAIN STROKE PROTC IC                        ACC: [id number] EXAM:  CT ANGIO NECK STROKE PROTCL IC                        ACC: [id number] EXAM:  CT BRAIN PERFUSION MAPS STROKE                          PROCEDURE DATE:  12/09/2022          INTERPRETATION:  CT PERFUSION WITH MAPS STROKE PROTOCOL, CT ANGIO NECK   STROKE PROTOCOL, CT ANGIO HEAD STROKE PROTOCOL  CT PERFUSION  CTA OF THE [place] OF [place] AND NECK:    INDICATIONS: Stroke Code. 88 yo M with a h/o recent brain bleed who   presents with frequent falls today. code stroke called. neuro at bedside   at CT. concern for worsening bleed vs stroke vs lyte abnormality vs   infection.  LKW 7 am, 2 falls, left sided weakness.  LCT  No additional clinical history was provided.    TECHNIQUE:  RAPID artificial intelligence was used for perfusion analysis and for   intracranial large vessel occlusion.    Contrast: 80 cc Omnipaque 350 administered. 0 cc discarded. 60 cc   Omnipaque 350 administered. 0 cc discarded.    CTA [place] OF [place]:  After the intravenous power injection of non-ionic contrast material,   serial thin sections were obtained through the intracranial circulation   on a multislice CT scanner.  Images were reformatted using a dedicated 3D   software package and viewed on a dedicated workstation in multiple planes.    CTA NECK:  After the intravenous power injection of non-ionic contrast material,   serial thin sections were obtained through the cervical circulation on a   multislice CT scanner. Images were reformatted using a dedicated 3D   software package and viewed on a dedicated workstation in multiple planes.    COMPARISON EXAMINATION: Noncontrast head CT performed immediately prior.    FINDINGS:    CT RAPID PERFUSION:  CBF<30% volume: 0 ml  Tmax>6.0 s volume: 0 ml  Mismatch volume: 0 ml  Mismatch ratio: none    CORE INFARCT: None.  TISSUE AT RISK: None.  MISMATCH RATIO: None.      CTA [place] OF [place]:  ANTERIOR CIRCULATION  ICA  CAVERNOUS, SUPRACLINOID, BIFURCATION SEGMENTS: Patent without flow   limiting stenosis.    ANTERIOR CEREBRAL ARTERIES: Bilateral A1, anterior communicating and A2   anterior cerebral arteries are unremarkable in course and caliber without   flow limiting stenosis.    MIDDLE CEREBRAL ARTERIES: Patent bilateral M1, M2, and distal MCA   branches without flow limiting stenosis. Possible slightly decreased   ossification of smaller vessels in the right MCA distal branches.      POSTERIOR CIRCULATION:  VERTEBRAL ARTERIES: Patent without flow limiting stenosis.  BASILAR ARTERY: Patent no flow limiting stenosis.  POSTERIOR CEREBRAL ARTERIES: Patent without flow limiting stenosis.      CTA NECK:  GREAT VESSELS: Visualized segments are patent, no flow limiting stenosis.    COMMON CAROTID ARTERIES:  RIGHT CCA: Patent without flow limiting stenosis  LEFT CCA: Patent without flow limiting stenosis    CAROTID BULBS:  RIGHT CB: Patent without flow limiting stenosis  LEFT CB: Patent without flow limiting stenosis    INTERNAL CAROTID ARTERIES:  RIGHT ICA:Patent no evidence for any hemodynamically significant   stenosis at the ICA origin by NASCET criteria.  LEFT ICA: Patent no evidence for any hemodynamically significant stenosis   at the ICA origin by NASCET criteria.    VERTEBRAL ARTERIES:  RIGHT VA: Patent no evidence for any flow limiting stenosis.  LEFT VA: Patent no evidence for any flow limiting stenosis.      SOFT TISSUES: Unremarkable  BONES: Unremarkable      IMPRESSION:    CT PERFUSION demonstrated: No core infarct. No active ischemia.  If symptoms persist consider follow up head CT or MRI, MRA  if no   contraindication.    CTA COW:  Patent intracranial circulation without flow limiting stenosis.   Possible slightly decreased ossification of smaller vessels in the right   MCA distal branches.    CTA NECK: Patent, ECAs, ICAs, no  hemodynamically significant stenosis at    ICA origins by NASCET criteria.  Bilateral vertebral arteries are patent without flow limiting stenosis.    Discussed with Dr. Meredith on the L  stroke team at8:11 PM. MRI may   provide helpful additional evaluation, if clinically indicated.    --- End of Report ---      < from: CT Brain Stroke Protocol (12.09.22 @ 19:46) >    ACC: [id number] EXAM:  CT BRAIN STROKE PROTOCOL                          PROCEDURE DATE:  12/09/2022          INTERPRETATION:  CT HEAD STROKE PROTOCOL  HEAD CT    INDICATIONS: Stroke Code. 88 yo M with a h/o recent brain bleed who   presents with frequent falls today. code stroke called. neuro at bedside   at CT. concern for worsening bleed vs stroke vs lyte abnormality vs   infection.  LKW 7 am, 2 falls, left sided weakness. LCT. No additional   history was provided.    TECHNIQUE:  HEAD CT:  Serial axial images were obtained from the skull base to the vertex   without the use of intravenous contrast. RAPID artificial intelligence   was used for preliminary evaluation of intracranial hemorrhage.    COMPARISON EXAMINATION: None.    FINDINGS:  HEAD CT:  VENTRICLES AND SULCI: Ventricles and sulci are unremarkable for patient   age.  INTRA-AXIAL: No intracranial mass, acute hemorrhage, or midline shift is   present.There is non-specific decreased attenuation in the white matter   likely microvascular disease.  EXTRA-AXIAL: No extra-axial fluid collection is present. Left frontal   probable meningioma 1.6 cm x 0.9 cm, image 2-25.  INTRACRANIAL HEMORRHAGE: None.    VISUALIZED SINUSES: Small bilateral axillary sinus air-fluid levels with   mild mucosal thickening scattered throughout the sinuses.  VISUALIZED MASTOIDS:  Clear.  CALVARIUM:  Intact.  MISCELLANEOUS:  None.    SOFT TISSUES: Unremarkable.  BONES: Unremarkable.      IMPRESSION:  HEAD CT: Mild volume loss, microvascular disease,no acute hemorrhage or   midline shift.  Left frontal probable meningioma 1.6 cm x 0.9 cm    Discussed with Dr. Meredith on the [facility] stroke team at 7:47 PM.    --- End of Report ---    < from: MR Head No Cont (12.15.22 @ 12:15) >      IMPRESSION:  Findings favor the presence of hemorrhagic infarct in the right frontal   parasagittal region with some scattered areas of diffusion hyperintensity   also in the right high NAIMA territory more peripherally. However, contrast   study is recommended to exclude less likely possibility of an underlying   hemorrhagic mass lesion though again acute infarct with hemorrhage is   favored. Left frontal dural based soft tissue lesion without significant   mass effect or edema consistent with a meningioma is appreciated.    --- End of Report ---      < end of copied text >  < from: MR Cervical Spine No Cont (12.15.22 @ 12:22) >    FINDINGS:  Vertebral bodies and Discs: Multilevel disc space narrowing with signal   hyperintensity C5-C6  Alignment:  No subluxations.  C2-C3 level: Left greater than right neural foraminal narrowing at this   level on the basis of uncovertebral joint degenerative change  C3-C4 level:  Right uncovertebral joint degenerative change greater than   left narrows the right neural foramen at this level  C4-C5 level:  Bilateral neural foraminal narrowing C4-5 on the basis of   uncovertebral joint degenerative change. Minimal central disc extrusion  C5-C6 level:  Central disc protrusion indents the ventral thecal sac.   Right greater than left neural foraminal narrowing at this level  C6-C7 level:  Left greater than right neural foraminal narrowing on the   basis of uncovertebral joint degenerative change. Small central disc   protrusion at this level  C7-T1 level:  Normal.  Spinal cord:  Normal.  Spinal canal:  No other intradural or extradural defects are seen.  Miscellaneous:  None.    IMPRESSION:  Multilevel degenerative changes as described. No significant   central stenosis. Multilevel foraminal narrowing on the basis of   uncovertebral jointdegenerative change. No cord pathology    --- End of Report ---    < end of copied text >           *************************************  NEUROLOGY PROGRESS NOTE  **************************************    CARMINE RHODES  Male  [id number]    Subjective: Pt seen at bedside. no acute complaints      VITAL SIGNS:  Vital Signs Last 24 Hrs  T(C): 36.8 (16 Dec 2022 11:08), Max: 36.9 (15 Dec 2022 16:39)  T(F): 98.3 (16 Dec 2022 11:08), Max: 98.5 (15 Dec 2022 16:39)  HR: 68 (16 Dec 2022 11:08) (63 - 76)  BP: 98/65 (16 Dec 2022 11:08) (98/65 - 117/72)  BP(mean): --  RR: 18 (16 Dec 2022 11:08) (17 - 18)  SpO2: 96% (16 Dec 2022 11:08) (95% - 98%)    Parameters below as of 16 Dec 2022 11:08  Patient On (Oxygen Delivery Method): room air        PHYSICAL EXAMINATION:  General: Well-developed, well nourished, in no acute distress.  Eyes: Conjunctiva and sclera clear.  Neck: supple, nontender, good ROM  Lungs: no respiratory distress  Neurologic:  - Mental Status:  Alert, awake, oriented to person, place, and time; Speech is fluent with intact naming, repetition, and comprehension but limited due to language barrier   - Cranial Nerves II-XII:  VFF, No nystagmus or APD noted, EOMI, PERRLA, V1-V3 intact, no facial asymmetry, t/p midline, SCM/trap intact.  - Motor: reduced muscle bulk and mildly diffusely increased tone throughout. Mild LUE proximal drift      L/R         Deltoid  4+/5    Biceps   5/5      Triceps  4+/5         Wrist Extension 5/5   Wrist Flexion  5/5      5/5   L/R         Hip Flexion  3/5    Knee Extension  5/5  Dorsiflexion  5/5      Plantar Flexion 5/5     - Sensory:  Intact to light touch, pin prick, vibration, and joint-position sense throughout.  - Coordination:  Finger-nose-finger and heel-knee-shin intact without dysmetria.  Rapid alternating hand movements intact.    LABS:                          12.6   5.14  )-----------( 206      ( 16 Dec 2022 05:55 )             38.7     12-16    139  |  105  |  19  ----------------------------<  87  3.3<L>   |  24  |  0.97    Ca    8.6      16 Dec 2022 05:55  Phos  2.9     12-16  Mg     2.00     12-16      RADIOLOGY & ADDITIONAL STUDIES:      < from: CT Angio Neck Stroke Protocol w/ IV Cont (12.09.22 @ 19:54) >    ACC: [id number] EXAM:  CT ANGIO BRAIN STROKE PROTC IC                        ACC: [id number] EXAM:  CT ANGIO NECK STROKE PROTCL IC                        ACC: [id number] EXAM:  CT BRAIN PERFUSION MAPS STROKE                          PROCEDURE DATE:  12/09/2022          INTERPRETATION:  CT PERFUSION WITH MAPS STROKE PROTOCOL, CT ANGIO NECK   STROKE PROTOCOL, CT ANGIO HEAD STROKE PROTOCOL  CT PERFUSION  CTA OF THE [place] OF [place]AG[place] AND NECK:    INDICATIONS: Stroke Code. 86 yo M with a h/o recent brain bleed who   presents with frequent falls today. code stroke called. neuro at bedside   at CT. concern for worsening bleed vs stroke vs lyte abnormality vs   infection.  LKW 7 am, 2 falls, left sided weakness.  LCT  No additional clinical history was provided.    TECHNIQUE:  RAPID artificial intelligence was used for perfusion analysis and for   intracranial large vessel occlusion.    Contrast: 80 cc Omnipaque 350 administered. 0 cc discarded. 60 cc   Omnipaque 350 administered. 0 cc discarded.    CTA [place] OF BRAGG:  After the intravenous power injection of non-ionic contrast material,   serial thin sections were obtained through the intracranial circulation   on a multislice CT scanner.  Images were reformatted using a dedicated 3D   software package and viewed on a dedicated workstation in multiple planes.    CTA NECK:  After the intravenous power injection of non-ionic contrast material,   serial thin sections were obtained through the cervical circulation on a   multislice CT scanner. Images were reformatted using a dedicated 3D   software package and viewed on a dedicated workstation in multiple planes.    COMPARISON EXAMINATION: Noncontrast head CT performed immediately prior.    FINDINGS:    CT RAPID PERFUSION:  CBF<30% volume: 0 ml  Tmax>6.0 s volume: 0 ml  Mismatch volume: 0 ml  Mismatch ratio: none    CORE INFARCT: None.  TISSUE AT RISK: None.  MISMATCH RATIO: None.      CTA [place] OF BRAGG:  ANTERIOR CIRCULATION  ICA  CAVERNOUS, SUPRACLINOID, BIFURCATION SEGMENTS: Patent without flow   limiting stenosis.    ANTERIOR CEREBRAL ARTERIES: Bilateral A1, anterior communicating and A2   anterior cerebral arteries are unremarkable in course and caliber without   flow limiting stenosis.    MIDDLE CEREBRAL ARTERIES: Patent bilateral M1, M2, and distal MCA   branches without flow limiting stenosis. Possible slightly decreased   ossification of smaller vessels in the right MCA distal branches.      POSTERIOR CIRCULATION:  VERTEBRAL ARTERIES: Patent without flow limiting stenosis.  BASILAR ARTERY: Patent no flow limiting stenosis.  POSTERIOR CEREBRAL ARTERIES: Patent without flow limiting stenosis.      CTA NECK:  GREAT VESSELS: Visualized segments are patent, no flow limiting stenosis.    COMMON CAROTID ARTERIES:  RIGHT CCA: Patent without flow limiting stenosis  LEFT CCA: Patent without flow limiting stenosis    CAROTID BULBS:  RIGHT CB: Patent without flow limiting stenosis  LEFT CB: Patent without flow limiting stenosis    INTERNAL CAROTID ARTERIES:  RIGHT ICA:Patent no evidence for any hemodynamically significant   stenosis at the ICA origin by NASCET criteria.  LEFT ICA: Patent no evidence for any hemodynamically significant stenosis   at the ICA origin by NASCET criteria.    VERTEBRAL ARTERIES:  RIGHT VA: Patent no evidence for any flow limiting stenosis.  LEFT VA: Patent no evidence for any flow limiting stenosis.      SOFT TISSUES: Unremarkable  BONES: Unremarkable      IMPRESSION:    CT PERFUSION demonstrated: No core infarct. No active ischemia.  If symptoms persist consider follow up head CT or MRI, MRA  if no   contraindication.    CTA COW:  Patent intracranial circulation without flow limiting stenosis.   Possible slightly decreased ossification of smaller vessels in the right   MCA distal branches.    CTA NECK: Patent, ECAs, ICAs, no  hemodynamically significant stenosis at    ICA origins by NASCET criteria.  Bilateral vertebral arteries are patent without flow limiting stenosis.    Discussed with Dr. Meredith on the [facility]  stroke team at8:11 PM. MRI may   provide helpful additional evaluation, if clinically indicated.    --- End of Report ---      < from: CT Brain Stroke Protocol (12.09.22 @ 19:46) >    ACC: [id number] EXAM:  CT BRAIN STROKE PROTOCOL                          PROCEDURE DATE:  12/09/2022          INTERPRETATION:  CT HEAD STROKE PROTOCOL  HEAD CT    INDICATIONS: Stroke Code. 86 yo M with a h/o recent brain bleed who   presents with frequent falls today. code stroke called. neuro at bedside   at CT. concern for worsening bleed vs stroke vs lyte abnormality vs   infection.  LKW 7 am, 2 falls, left sided weakness. LCT. No additional   history was provided.    TECHNIQUE:  HEAD CT:  Serial axial images were obtained from the skull base to the vertex   without the use of intravenous contrast. RAPID artificial intelligence   was used for preliminary evaluation of intracranial hemorrhage.    COMPARISON EXAMINATION: None.    FINDINGS:  HEAD CT:  VENTRICLES AND SULCI: Ventricles and sulci are unremarkable for patient   age.  INTRA-AXIAL: No intracranial mass, acute hemorrhage, or midline shift is   present.There is non-specific decreased attenuation in the white matter   likely microvascular disease.  EXTRA-AXIAL: No extra-axial fluid collection is present. Left frontal   probable meningioma 1.6 cm x 0.9 cm, image 2-25.  INTRACRANIAL HEMORRHAGE: None.    VISUALIZED SINUSES: Small bilateral axillary sinus air-fluid levels with   mild mucosal thickening scattered throughout the sinuses.  VISUALIZED MASTOIDS:  Clear.  CALVARIUM:  Intact.  MISCELLANEOUS:  None.    SOFT TISSUES: Unremarkable.  BONES: Unremarkable.      IMPRESSION:  HEAD CT: Mild volume loss, microvascular disease,no acute hemorrhage or   midline shift.  Left frontal probable meningioma 1.6 cm x 0.9 cm    Discussed with Dr. Meredith on the [facility] stroke team at 7:47 PM.    --- End of Report ---    < from: MR Head No Cont (12.15.22 @ 12:15) >      IMPRESSION:  Findings favor the presence of hemorrhagic infarct in the right frontal   parasagittal region with some scattered areas of diffusion hyperintensity   also in the right high NAIMA territory more peripherally. However, contrast   study is recommended to exclude less likely possibility of an underlying   hemorrhagic mass lesion though again acute infarct with hemorrhage is   favored. Left frontal dural based soft tissue lesion without significant   mass effect or edema consistent with a meningioma is appreciated.    --- End of Report ---      < end of copied text >  < from: MR Cervical Spine No Cont (12.15.22 @ 12:22) >    FINDINGS:  Vertebral bodies and Discs: Multilevel disc space narrowing with signal   hyperintensity C5-C6  Alignment:  No subluxations.  C2-C3 level: Left greater than right neural foraminal narrowing at this   level on the basis of uncovertebral joint degenerative change  C3-C4 level:  Right uncovertebral joint degenerative change greater than   left narrows the right neural foramen at this level  C4-C5 level:  Bilateral neural foraminal narrowing C4-5 on the basis of   uncovertebral joint degenerative change. Minimal central disc extrusion  C5-C6 level:  Central disc protrusion indents the ventral thecal sac.   Right greater than left neural foraminal narrowing at this level  C6-C7 level:  Left greater than right neural foraminal narrowing on the   basis of uncovertebral joint degenerative change. Small central disc   protrusion at this level  C7-T1 level:  Normal.  Spinal cord:  Normal.  Spinal canal:  No other intradural or extradural defects are seen.  Miscellaneous:  None.    IMPRESSION:  Multilevel degenerative changes as described. No significant   central stenosis. Multilevel foraminal narrowing on the basis of   uncovertebral jointdegenerative change. No cord pathology    --- End of Report ---    < end of copied text >           *************************************  NEUROLOGY PROGRESS NOTE  **************************************    CARMINE RHODES  Male  [id number]    Subjective: Pt seen at bedside. no acute complaints      VITAL SIGNS:  Vital Signs Last 24 Hrs  T(C): 36.8 (16 Dec 2022 11:08), Max: 36.9 (15 Dec 2022 16:39)  T(F): 98.3 (16 Dec 2022 11:08), Max: 98.5 (15 Dec 2022 16:39)  HR: 68 (16 Dec 2022 11:08) (63 - 76)  BP: 98/65 (16 Dec 2022 11:08) (98/65 - 117/72)  BP(mean): --  RR: 18 (16 Dec 2022 11:08) (17 - 18)  SpO2: 96% (16 Dec 2022 11:08) (95% - 98%)    Parameters below as of 16 Dec 2022 11:08  Patient On (Oxygen Delivery Method): room air        PHYSICAL EXAMINATION:  General: Well-developed, well nourished, in no acute distress.  Eyes: Conjunctiva and sclera clear.  Neck: supple, nontender, good ROM  Lungs: no respiratory distress  Neurologic:  - Mental Status:  Alert, awake, oriented to person, place, and time; Speech is fluent with intact naming, repetition, and comprehension but limited due to language barrier   - Cranial Nerves II-XII:  VFF, No nystagmus or APD noted, EOMI, PERRLA, V1-V3 intact, no facial asymmetry, t/p midline, SCM/trap intact.  - Motor: reduced muscle bulk and mildly diffusely increased tone throughout. Mild LUE proximal drift      L/R         Deltoid  4+/5    Biceps   5/5      Triceps  4+/5         Wrist Extension 5/5   Wrist Flexion  5/5      5/5   L/R         Hip Flexion  3/5    Knee Extension  5/5  Dorsiflexion  5/5      Plantar Flexion 5/5     - Sensory:  Intact to light touch, pin prick, vibration, and joint-position sense throughout.  - Coordination:  Finger-nose-finger and heel-knee-shin intact without dysmetria.  Rapid alternating hand movements intact.    LABS:                          12.6   5.14  )-----------( 206      ( 16 Dec 2022 05:55 )             38.7     12-16    139  |  105  |  19  ----------------------------<  87  3.3<L>   |  24  |  0.97    Ca    8.6      16 Dec 2022 05:55  Phos  2.9     12-16  Mg     2.00     12-16      RADIOLOGY & ADDITIONAL STUDIES:      < from: CT Angio Neck Stroke Protocol w/ IV Cont (12.09.22 @ 19:54) >    ACC: [id number] EXAM:  CT ANGIO BRAIN STROKE PROTC IC                        ACC: [id number] EXAM:  CT ANGIO NECK STROKE PROTCL IC                        ACC: [id number] EXAM:  CT BRAIN PERFUSION MAPS STROKE                          PROCEDURE DATE:  12/09/2022          INTERPRETATION:  CT PERFUSION WITH MAPS STROKE PROTOCOL, CT ANGIO NECK   STROKE PROTOCOL, CT ANGIO HEAD STROKE PROTOCOL  CT PERFUSION  CTA OF THE [place] OF [place] AND NECK:    INDICATIONS: Stroke Code. 88 yo M with a h/o recent brain bleed who   presents with frequent falls today. code stroke called. neuro at bedside   at CT. concern for worsening bleed vs stroke vs lyte abnormality vs   infection.  LKW 7 am, 2 falls, left sided weakness.  LCT  No additional clinical history was provided.    TECHNIQUE:  RAPID artificial intelligence was used for perfusion analysis and for   intracranial large vessel occlusion.    Contrast: 80 cc Omnipaque 350 administered. 0 cc discarded. 60 cc   Omnipaque 350 administered. 0 cc discarded.    CTA [place] OF BRAG[place]:  After the intravenous power injection of non-ionic contrast material,   serial thin sections were obtained through the intracranial circulation   on a multislice CT scanner.  Images were reformatted using a dedicated 3D   software package and viewed on a dedicated workstation in multiple planes.    CTA NECK:  After the intravenous power injection of non-ionic contrast material,   serial thin sections were obtained through the cervical circulation on a   multislice CT scanner. Images were reformatted using a dedicated 3D   software package and viewed on a dedicated workstation in multiple planes.    COMPARISON EXAMINATION: Noncontrast head CT performed immediately prior.    FINDINGS:    CT RAPID PERFUSION:  CBF<30% volume: 0 ml  Tmax>6.0 s volume: 0 ml  Mismatch volume: 0 ml  Mismatch ratio: none    CORE INFARCT: None.  TISSUE AT RISK: None.  MISMATCH RATIO: None.      CTA [place]:  ANTERIOR CIRCULATION  ICA  CAVERNOUS, SUPRACLINOID, BIFURCATION SEGMENTS: Patent without flow   limiting stenosis.    ANTERIOR CEREBRAL ARTERIES: Bilateral A1, anterior communicating and A2   anterior cerebral arteries are unremarkable in course and caliber without   flow limiting stenosis.    MIDDLE CEREBRAL ARTERIES: Patent bilateral M1, M2, and distal MCA   branches without flow limiting stenosis. Possible slightly decreased   ossification of smaller vessels in the right MCA distal branches.      POSTERIOR CIRCULATION:  VERTEBRAL ARTERIES: Patent without flow limiting stenosis.  BASILAR ARTERY: Patent no flow limiting stenosis.  POSTERIOR CEREBRAL ARTERIES: Patent without flow limiting stenosis.      CTA NECK:  GREAT VESSELS: Visualized segments are patent, no flow limiting stenosis.    COMMON CAROTID ARTERIES:  RIGHT CCA: Patent without flow limiting stenosis  LEFT CCA: Patent without flow limiting stenosis    CAROTID BULBS:  RIGHT CB: Patent without flow limiting stenosis  LEFT CB: Patent without flow limiting stenosis    INTERNAL CAROTID ARTERIES:  RIGHT ICA:Patent no evidence for any hemodynamically significant   stenosis at the ICA origin by NASCET criteria.  LEFT ICA: Patent no evidence for any hemodynamically significant stenosis   at the ICA origin by NASCET criteria.    VERTEBRAL ARTERIES:  RIGHT VA: Patent no evidence for any flow limiting stenosis.  LEFT VA: Patent no evidence for any flow limiting stenosis.      SOFT TISSUES: Unremarkable  BONES: Unremarkable      IMPRESSION:    CT PERFUSION demonstrated: No core infarct. No active ischemia.  If symptoms persist consider follow up head CT or MRI, MRA  if no   contraindication.    CTA COW:  Patent intracranial circulation without flow limiting stenosis.   Possible slightly decreased ossification of smaller vessels in the right   MCA distal branches.    CTA NECK: Patent, ECAs, ICAs, no  hemodynamically significant stenosis at    ICA origins by NASCET criteria.  Bilateral vertebral arteries are patent without flow limiting stenosis.    Discussed with Dr. Meredith on the L  stroke team at8:11 PM. MRI may   provide helpful additional evaluation, if clinically indicated.    --- End of Report ---      < from: CT Brain Stroke Protocol (12.09.22 @ 19:46) >    ACC: [id number] EXAM:  CT BRAIN STROKE PROTOCOL                          PROCEDURE DATE:  12/09/2022          INTERPRETATION:  CT HEAD STROKE PROTOCOL  HEAD CT    INDICATIONS: Stroke Code. 88 yo M with a h/o recent brain bleed who   presents with frequent falls today. code stroke called. neuro at bedside   at CT. concern for worsening bleed vs stroke vs lyte abnormality vs   infection.  LKW 7 am, 2 falls, left sided weakness. LCT. No additional   history was provided.    TECHNIQUE:  HEAD CT:  Serial axial images were obtained from the skull base to the vertex   without the use of intravenous contrast. RAPID artificial intelligence   was used for preliminary evaluation of intracranial hemorrhage.    COMPARISON EXAMINATION: None.    FINDINGS:  HEAD CT:  VENTRICLES AND SULCI: Ventricles and sulci are unremarkable for patient   age.  INTRA-AXIAL: No intracranial mass, acute hemorrhage, or midline shift is   present.There is non-specific decreased attenuation in the white matter   likely microvascular disease.  EXTRA-AXIAL: No extra-axial fluid collection is present. Left frontal   probable meningioma 1.6 cm x 0.9 cm, image 2-25.  INTRACRANIAL HEMORRHAGE: None.    VISUALIZED SINUSES: Small bilateral axillary sinus air-fluid levels with   mild mucosal thickening scattered throughout the sinuses.  VISUALIZED MASTOIDS:  Clear.  CALVARIUM:  Intact.  MISCELLANEOUS:  None.    SOFT TISSUES: Unremarkable.  BONES: Unremarkable.      IMPRESSION:  HEAD CT: Mild volume loss, microvascular disease,no acute hemorrhage or   midline shift.  Left frontal probable meningioma 1.6 cm x 0.9 cm    Discussed with Dr. Meredith on the [facility] stroke team at 7:47 PM.    --- End of Report ---    < from: MR Head No Cont (12.15.22 @ 12:15) >      IMPRESSION:  Findings favor the presence of hemorrhagic infarct in the right frontal   parasagittal region with some scattered areas of diffusion hyperintensity   also in the right high NAIMA territory more peripherally. However, contrast   study is recommended to exclude less likely possibility of an underlying   hemorrhagic mass lesion though again acute infarct with hemorrhage is   favored. Left frontal dural based soft tissue lesion without significant   mass effect or edema consistent with a meningioma is appreciated.    --- End of Report ---      < end of copied text >  < from: MR Cervical Spine No Cont (12.15.22 @ 12:22) >    FINDINGS:  Vertebral bodies and Discs: Multilevel disc space narrowing with signal   hyperintensity C5-C6  Alignment:  No subluxations.  C2-C3 level: Left greater than right neural foraminal narrowing at this   level on the basis of uncovertebral joint degenerative change  C3-C4 level:  Right uncovertebral joint degenerative change greater than   left narrows the right neural foramen at this level  C4-C5 level:  Bilateral neural foraminal narrowing C4-5 on the basis of   uncovertebral joint degenerative change. Minimal central disc extrusion  C5-C6 level:  Central disc protrusion indents the ventral thecal sac.   Right greater than left neural foraminal narrowing at this level  C6-C7 level:  Left greater than right neural foraminal narrowing on the   basis of uncovertebral joint degenerative change. Small central disc   protrusion at this level  C7-T1 level:  Normal.  Spinal cord:  Normal.  Spinal canal:  No other intradural or extradural defects are seen.  Miscellaneous:  None.    IMPRESSION:  Multilevel degenerative changes as described. No significant   central stenosis. Multilevel foraminal narrowing on the basis of   uncovertebral jointdegenerative change. No cord pathology    --- End of Report ---    < end of copied text >

## 2022-12-16 NOTE — DISCHARGE NOTE NURSING/CASE MANAGEMENT/SOCIAL WORK - NSSCNAMETXT_GEN_ALL_CORE
[facility] at home [facility] at home [facility] at home [facility] Home [facility] Home [facility]

## 2022-12-16 NOTE — PROGRESS NOTE ADULT - PROBLEM SELECTOR PLAN 4
- [id number] score = 5  - not on AC PTA (due to repeated falls, including w/ head trauma)  - holding home BB - [id number] score = 5  - not on AC PTA (due to repeated falls, including w/ head trauma)  - holding home BB - [id number] score = 5  - not on AC PTA (due to repeated falls, including w/ head trauma)  - holding home BB

## 2022-12-16 NOTE — DISCHARGE NOTE NURSING/CASE MANAGEMENT/SOCIAL WORK - CASE MANAGER'S NAME
Haley Prieto [facility] [phone] Haley Prieto [facility] [phone] Haley Prieto [facility] [phone]

## 2022-12-16 NOTE — PROGRESS NOTE ADULT - SUBJECTIVE AND OBJECTIVE BOX
Dr. Federica Horton  Pager [phone]    PROGRESS NOTE:     Patient is a 87y old  Male who presents with a chief complaint of Generalized weakness (15 Dec 2022 14:08)      SUBJECTIVE / OVERNIGHT EVENTS: denies chest pain or sob   ADDITIONAL REVIEW OF SYSTEMS: afebrile , in good spirits    MEDICATIONS  (STANDING):  aspirin  chewable 81 milliGRAM(s) Oral daily  atorvastatin 80 milliGRAM(s) Oral at bedtime  enoxaparin Injectable 40 milliGRAM(s) SubCutaneous every 24 hours  gabapentin 300 milliGRAM(s) Oral daily  multivitamin 1 Tablet(s) Oral daily  pancrelipase  (CREON 24,000 Lipase Units) 1 Capsule(s) Oral three times a day with meals  pantoprazole    Tablet 40 milliGRAM(s) Oral before breakfast  potassium chloride    Tablet ER 40 milliEquivalent(s) Oral every 4 hours  sertraline 25 milliGRAM(s) Oral daily  tamsulosin 0.4 milliGRAM(s) Oral at bedtime  thiamine 100 milliGRAM(s) Oral daily    MEDICATIONS  (PRN):  acetaminophen     Tablet .. 650 milliGRAM(s) Oral every 6 hours PRN Mild Pain (1 - 3), Moderate Pain (4 - 6)      CAPILLARY BLOOD GLUCOSE        I&O's Summary      PHYSICAL EXAM:  Vital Signs Last 24 Hrs  T(C): 36.8 (16 Dec 2022 11:08), Max: 36.9 (15 Dec 2022 16:39)  T(F): 98.3 (16 Dec 2022 11:08), Max: 98.5 (15 Dec 2022 16:39)  HR: 68 (16 Dec 2022 11:08) (63 - 76)  BP: 98/65 (16 Dec 2022 11:08) (98/65 - 117/72)  BP(mean): --  RR: 18 (16 Dec 2022 11:08) (17 - 18)  SpO2: 96% (16 Dec 2022 11:08) (95% - 98%)    Parameters below as of 16 Dec 2022 11:08  Patient On (Oxygen Delivery Method): room air  General: NAD, awake and alert  Eyes: PERRLA, conjunctiva clear, nonicteric sclera  HENMT: MMM  Respiratory: No respiratory distress, CTABL, No rales, rhonchi, wheezing.  Cardiovascular: S1,S2; Regular rate and rhythm; No m/g/r. 2+ peripheral pulses  Gastrointestinal: Soft, Nontender, Nondistended; +BS.   Extremities: No c/c/e; warm to touch  Neurological: Strength: CN Ii-XII intact. 5/5 bilateral handgrip, biceps, quadriceps hamstrings, calf. Sensation to LT grossly in tact in all 4 extremities.  Skin: No rashes, No erythema   Psych: AAOx2, pleasant, in good spirits      LABS:                        12.6   5.14  )-----------( 206      ( 16 Dec 2022 05:55 )             38.7     12-16    139  |  105  |  19  ----------------------------<  87  3.3<L>   |  24  |  0.97    Ca    8.6      16 Dec 2022 05:55  Phos  2.9     12-16  Mg     2.00     12-16          RADIOLOGY & ADDITIONAL TESTS:  Results Reviewed:   Imaging Personally Reviewed:  < from: MR Head No Cont (12.15.22 @ 12:15) >  Findings favor the presence of hemorrhagic infarct in the right frontal   parasagittal region with some scattered areas of diffusion hyperintensity   also in the right high NAIMA territory more peripherally. However, contrast   study is recommended to exclude less likely possibility of an underlying   hemorrhagic mass lesion though again acute infarct with hemorrhage is   favored. Left frontal dural based soft tissue lesion without significant   mass effect or edema consistent with a meningioma is appreciated.    Electrocardiogram Personally Reviewed:    COORDINATION OF CARE:  Care Discussed with Consultants/Other Providers [Y/N]: laisha castle dc if cleared by neuro  Prior or Outpatient Records Reviewed [Y/N]:   Dr. Federica Horton  Pager [phone]    PROGRESS NOTE:     Patient is a 87y old  Male who presents with a chief complaint of Generalized weakness (15 Dec 2022 14:08)      SUBJECTIVE / OVERNIGHT EVENTS: denies chest pain or sob   ADDITIONAL REVIEW OF SYSTEMS: afebrile , in good spirits    MEDICATIONS  (STANDING):  aspirin  chewable 81 milliGRAM(s) Oral daily  atorvastatin 80 milliGRAM(s) Oral at bedtime  enoxaparin Injectable 40 milliGRAM(s) SubCutaneous every 24 hours  gabapentin 300 milliGRAM(s) Oral daily  multivitamin 1 Tablet(s) Oral daily  pancrelipase  (CREON 24,000 Lipase Units) 1 Capsule(s) Oral three times a day with meals  pantoprazole    Tablet 40 milliGRAM(s) Oral before breakfast  potassium chloride    Tablet ER 40 milliEquivalent(s) Oral every 4 hours  sertraline 25 milliGRAM(s) Oral daily  tamsulosin 0.4 milliGRAM(s) Oral at bedtime  thiamine 100 milliGRAM(s) Oral daily    MEDICATIONS  (PRN):  acetaminophen     Tablet .. 650 milliGRAM(s) Oral every 6 hours PRN Mild Pain (1 - 3), Moderate Pain (4 - 6)      CAPILLARY BLOOD GLUCOSE        I&O's Summary      PHYSICAL EXAM:  Vital Signs Last 24 Hrs  T(C): 36.8 (16 Dec 2022 11:08), Max: 36.9 (15 Dec 2022 16:39)  T(F): 98.3 (16 Dec 2022 11:08), Max: 98.5 (15 Dec 2022 16:39)  HR: 68 (16 Dec 2022 11:08) (63 - 76)  BP: 98/65 (16 Dec 2022 11:08) (98/65 - 117/72)  BP(mean): --  RR: 18 (16 Dec 2022 11:08) (17 - 18)  SpO2: 96% (16 Dec 2022 11:08) (95% - 98%)    Parameters below as of 16 Dec 2022 11:08  Patient On (Oxygen Delivery Method): room air  General: NAD, awake and alert  Eyes: PERRLA, conjunctiva clear, nonicteric sclera  HENMT: MMM  Respiratory: No respiratory distress, CTABL, No rales, rhonchi, wheezing.  Cardiovascular: S1,S2; Regular rate and rhythm; No m/g/r. 2+ peripheral pulses  Gastrointestinal: Soft, Nontender, Nondistended; +BS.   Extremities: No c/c/e; warm to touch  Neurological: Strength: CN Ii-XII intact. 5/5 bilateral handgrip, biceps, quadriceps hamstrings, calf. Sensation to LT grossly in tact in all 4 extremities.  Skin: No rashes, No erythema   Psych: AAOx2, pleasant, in good spirits      LABS:                        12.6   5.14  )-----------( 206      ( 16 Dec 2022 05:55 )             38.7     12-16    139  |  105  |  19  ----------------------------<  87  3.3<L>   |  24  |  0.97    Ca    8.6      16 Dec 2022 05:55  Phos  2.9     12-16  Mg     2.00     12-16          RADIOLOGY & ADDITIONAL TESTS:  Results Reviewed:   Imaging Personally Reviewed:  < from: MR Head No Cont (12.15.22 @ 12:15) >  Findings favor the presence of hemorrhagic infarct in the right frontal   parasagittal region with some scattered areas of diffusion hyperintensity   also in the right high NAIMA territory more peripherally. However, contrast   study is recommended to exclude less likely possibility of an underlying   hemorrhagic mass lesion though again acute infarct with hemorrhage is   favored. Left frontal dural based soft tissue lesion without significant   mass effect or edema consistent with a meningioma is appreciated.    Electrocardiogram Personally Reviewed:    COORDINATION OF CARE:  Care Discussed with Consultants/Other Providers [Y/N]: laisha castle dc if cleared by neuro  Prior or Outpatient Records Reviewed [Y/N]:   Dr. Federica Horton  Pager [phone]    PROGRESS NOTE:     Patient is a 87y old  Male who presents with a chief complaint of Generalized weakness (15 Dec 2022 14:08)      SUBJECTIVE / OVERNIGHT EVENTS: denies chest pain or sob   ADDITIONAL REVIEW OF SYSTEMS: afebrile , in good spirits    MEDICATIONS  (STANDING):  aspirin  chewable 81 milliGRAM(s) Oral daily  atorvastatin 80 milliGRAM(s) Oral at bedtime  enoxaparin Injectable 40 milliGRAM(s) SubCutaneous every 24 hours  gabapentin 300 milliGRAM(s) Oral daily  multivitamin 1 Tablet(s) Oral daily  pancrelipase  (CREON 24,000 Lipase Units) 1 Capsule(s) Oral three times a day with meals  pantoprazole    Tablet 40 milliGRAM(s) Oral before breakfast  potassium chloride    Tablet ER 40 milliEquivalent(s) Oral every 4 hours  sertraline 25 milliGRAM(s) Oral daily  tamsulosin 0.4 milliGRAM(s) Oral at bedtime  thiamine 100 milliGRAM(s) Oral daily    MEDICATIONS  (PRN):  acetaminophen     Tablet .. 650 milliGRAM(s) Oral every 6 hours PRN Mild Pain (1 - 3), Moderate Pain (4 - 6)      CAPILLARY BLOOD GLUCOSE        I&O's Summary      PHYSICAL EXAM:  Vital Signs Last 24 Hrs  T(C): 36.8 (16 Dec 2022 11:08), Max: 36.9 (15 Dec 2022 16:39)  T(F): 98.3 (16 Dec 2022 11:08), Max: 98.5 (15 Dec 2022 16:39)  HR: 68 (16 Dec 2022 11:08) (63 - 76)  BP: 98/65 (16 Dec 2022 11:08) (98/65 - 117/72)  BP(mean): --  RR: 18 (16 Dec 2022 11:08) (17 - 18)  SpO2: 96% (16 Dec 2022 11:08) (95% - 98%)    Parameters below as of 16 Dec 2022 11:08  Patient On (Oxygen Delivery Method): room air  General: NAD, awake and alert  Eyes: PERRLA, conjunctiva clear, nonicteric sclera  HENMT: MMM  Respiratory: No respiratory distress, CTABL, No rales, rhonchi, wheezing.  Cardiovascular: S1,S2; Regular rate and rhythm; No m/g/r. 2+ peripheral pulses  Gastrointestinal: Soft, Nontender, Nondistended; +BS.   Extremities: No c/c/e; warm to touch  Neurological: Strength: CN Ii-XII intact. 5/5 bilateral handgrip, biceps, quadriceps hamstrings, calf. Sensation to LT grossly in tact in all 4 extremities.  Skin: No rashes, No erythema   Psych: AAOx2, pleasant, in good spirits      LABS:                        12.6   5.14  )-----------( 206      ( 16 Dec 2022 05:55 )             38.7     12-16    139  |  105  |  19  ----------------------------<  87  3.3<L>   |  24  |  0.97    Ca    8.6      16 Dec 2022 05:55  Phos  2.9     12-16  Mg     2.00     12-16          RADIOLOGY & ADDITIONAL TESTS:  Results Reviewed:   Imaging Personally Reviewed:  < from: MR Head No Cont (12.15.22 @ 12:15) >  Findings favor the presence of hemorrhagic infarct in the right frontal   parasagittal region with some scattered areas of diffusion hyperintensity   also in the right high NAIMA territory more peripherally. However, contrast   study is recommended to exclude less likely possibility of an underlying   hemorrhagic mass lesion though again acute infarct with hemorrhage is   favored. Left frontal dural based soft tissue lesion without significant   mass effect or edema consistent with a meningioma is appreciated.    Electrocardiogram Personally Reviewed:    COORDINATION OF CARE:  Care Discussed with Consultants/Other Providers [Y/N]: laisha castle dc if cleared by neuro  Prior or Outpatient Records Reviewed [Y/N]:   Dr. Federica Horton  Pager [phone]    PROGRESS NOTE:     Patient is a 87y old  Male who presents with a chief complaint of Generalized weakness (15 Dec 2022 14:08)      SUBJECTIVE / OVERNIGHT EVENTS: denies chest pain or sob   ADDITIONAL REVIEW OF SYSTEMS: afebrile , in good spirits    MEDICATIONS  (STANDING):  aspirin  chewable 81 milliGRAM(s) Oral daily  atorvastatin 80 milliGRAM(s) Oral at bedtime  enoxaparin Injectable 40 milliGRAM(s) SubCutaneous every 24 hours  gabapentin 300 milliGRAM(s) Oral daily  multivitamin 1 Tablet(s) Oral daily  pancrelipase  (CREON 24,000 Lipase Units) 1 Capsule(s) Oral three times a day with meals  pantoprazole    Tablet 40 milliGRAM(s) Oral before breakfast  potassium chloride    Tablet ER 40 milliEquivalent(s) Oral every 4 hours  sertraline 25 milliGRAM(s) Oral daily  tamsulosin 0.4 milliGRAM(s) Oral at bedtime  thiamine 100 milliGRAM(s) Oral daily    MEDICATIONS  (PRN):  acetaminophen     Tablet .. 650 milliGRAM(s) Oral every 6 hours PRN Mild Pain (1 - 3), Moderate Pain (4 - 6)      CAPILLARY BLOOD GLUCOSE        I&O's Summary      PHYSICAL EXAM:  Vital Signs Last 24 Hrs  T(C): 36.8 (16 Dec 2022 11:08), Max: 36.9 (15 Dec 2022 16:39)  T(F): 98.3 (16 Dec 2022 11:08), Max: 98.5 (15 Dec 2022 16:39)  HR: 68 (16 Dec 2022 11:08) (63 - 76)  BP: 98/65 (16 Dec 2022 11:08) (98/65 - 117/72)  BP(mean): --  RR: 18 (16 Dec 2022 11:08) (17 - 18)  SpO2: 96% (16 Dec 2022 11:08) (95% - 98%)    Parameters below as of 16 Dec 2022 11:08  Patient On (Oxygen Delivery Method): room air  General: NAD, awake and alert  Eyes: PERRLA, conjunctiva clear, nonicteric sclera  HENMT: MMM  Respiratory: No respiratory distress, CTABL, No rales, rhonchi, wheezing.  Cardiovascular: S1,S2; Regular rate and rhythm; No m/g/r. 2+ peripheral pulses  Gastrointestinal: Soft, Nontender, Nondistended; +BS.   Extremities: No c/c/e; warm to touch  Neurological: Strength: CN Ii-XII intact. 5/5 bilateral handgrip, biceps, quadriceps hamstrings, calf. Sensation to LT grossly in tact in all 4 extremities.  Skin: No rashes, No erythema   Psych: AAOx2, pleasant, in good spirits      LABS:                        12.6   5.14  )-----------( 206      ( 16 Dec 2022 05:55 )             38.7     12-16    139  |  105  |  19  ----------------------------<  87  3.3<L>   |  24  |  0.97    Ca    8.6      16 Dec 2022 05:55  Phos  2.9     12-16  Mg     2.00     12-16          RADIOLOGY & ADDITIONAL TESTS:  Results Reviewed:   Imaging Personally Reviewed:  < from: MR Head No Cont (12.15.22 @ 12:15) >  Findings favor the presence of hemorrhagic infarct in the right frontal   parasagittal region with some scattered areas of diffusion hyperintensity   also in the right high NAIMA territory more peripherally. However, contrast   study is recommended to exclude less likely possibility of an underlying   hemorrhagic mass lesion though again acute infarct with hemorrhage is   favored. Left frontal dural based soft tissue lesion without significant   mass effect or edema consistent with a meningioma is appreciated.    Electrocardiogram Personally Reviewed:    COORDINATION OF CARE:  Care Discussed with Consultants/Other Providers [Y/N]: laisha castle, neuro resident, dc aspirin, neurosurgery consult   Prior or Outpatient Records Reviewed [Y/N]:   Dr. Federica Horton  Pager [phone]    PROGRESS NOTE:     Patient is a 87y old  Male who presents with a chief complaint of Generalized weakness (15 Dec 2022 14:08)      SUBJECTIVE / OVERNIGHT EVENTS: denies chest pain or sob   ADDITIONAL REVIEW OF SYSTEMS: afebrile , in good spirits    MEDICATIONS  (STANDING):  aspirin  chewable 81 milliGRAM(s) Oral daily  atorvastatin 80 milliGRAM(s) Oral at bedtime  enoxaparin Injectable 40 milliGRAM(s) SubCutaneous every 24 hours  gabapentin 300 milliGRAM(s) Oral daily  multivitamin 1 Tablet(s) Oral daily  pancrelipase  (CREON 24,000 Lipase Units) 1 Capsule(s) Oral three times a day with meals  pantoprazole    Tablet 40 milliGRAM(s) Oral before breakfast  potassium chloride    Tablet ER 40 milliEquivalent(s) Oral every 4 hours  sertraline 25 milliGRAM(s) Oral daily  tamsulosin 0.4 milliGRAM(s) Oral at bedtime  thiamine 100 milliGRAM(s) Oral daily    MEDICATIONS  (PRN):  acetaminophen     Tablet .. 650 milliGRAM(s) Oral every 6 hours PRN Mild Pain (1 - 3), Moderate Pain (4 - 6)      CAPILLARY BLOOD GLUCOSE        I&O's Summary      PHYSICAL EXAM:  Vital Signs Last 24 Hrs  T(C): 36.8 (16 Dec 2022 11:08), Max: 36.9 (15 Dec 2022 16:39)  T(F): 98.3 (16 Dec 2022 11:08), Max: 98.5 (15 Dec 2022 16:39)  HR: 68 (16 Dec 2022 11:08) (63 - 76)  BP: 98/65 (16 Dec 2022 11:08) (98/65 - 117/72)  BP(mean): --  RR: 18 (16 Dec 2022 11:08) (17 - 18)  SpO2: 96% (16 Dec 2022 11:08) (95% - 98%)    Parameters below as of 16 Dec 2022 11:08  Patient On (Oxygen Delivery Method): room air  General: NAD, awake and alert  Eyes: PERRLA, conjunctiva clear, nonicteric sclera  HENMT: MMM  Respiratory: No respiratory distress, CTABL, No rales, rhonchi, wheezing.  Cardiovascular: S1,S2; Regular rate and rhythm; No m/g/r. 2+ peripheral pulses  Gastrointestinal: Soft, Nontender, Nondistended; +BS.   Extremities: No c/c/e; warm to touch  Neurological: Strength: CN Ii-XII intact. 5/5 bilateral handgrip, biceps, quadriceps hamstrings, calf. Sensation to LT grossly in tact in all 4 extremities.  Skin: No rashes, No erythema   Psych: AAOx2, pleasant, in good spirits      LABS:                        12.6   5.14  )-----------( 206      ( 16 Dec 2022 05:55 )             38.7     12-16    139  |  105  |  19  ----------------------------<  87  3.3<L>   |  24  |  0.97    Ca    8.6      16 Dec 2022 05:55  Phos  2.9     12-16  Mg     2.00     12-16          RADIOLOGY & ADDITIONAL TESTS:  Results Reviewed:   Imaging Personally Reviewed:  < from: MR Head No Cont (12.15.22 @ 12:15) >  Findings favor the presence of hemorrhagic infarct in the right frontal   parasagittal region with some scattered areas of diffusion hyperintensity   also in the right high NAIMA territory more peripherally. However, contrast   study is recommended to exclude less likely possibility of an underlying   hemorrhagic mass lesion though again acute infarct with hemorrhage is   favored. Left frontal dural based soft tissue lesion without significant   mass effect or edema consistent with a meningioma is appreciated.    Electrocardiogram Personally Reviewed:    COORDINATION OF CARE:  Care Discussed with Consultants/Other Providers [Y/N]: laisha castle, neuro resident, dc aspirin, neurosurgery consult   Prior or Outpatient Records Reviewed [Y/N]:   Dr. Federica Horton  Pager [phone]    PROGRESS NOTE:     Patient is a 87y old  Male who presents with a chief complaint of Generalized weakness (15 Dec 2022 14:08)      SUBJECTIVE / OVERNIGHT EVENTS: denies chest pain or sob   ADDITIONAL REVIEW OF SYSTEMS: afebrile , in good spirits    MEDICATIONS  (STANDING):  aspirin  chewable 81 milliGRAM(s) Oral daily  atorvastatin 80 milliGRAM(s) Oral at bedtime  enoxaparin Injectable 40 milliGRAM(s) SubCutaneous every 24 hours  gabapentin 300 milliGRAM(s) Oral daily  multivitamin 1 Tablet(s) Oral daily  pancrelipase  (CREON 24,000 Lipase Units) 1 Capsule(s) Oral three times a day with meals  pantoprazole    Tablet 40 milliGRAM(s) Oral before breakfast  potassium chloride    Tablet ER 40 milliEquivalent(s) Oral every 4 hours  sertraline 25 milliGRAM(s) Oral daily  tamsulosin 0.4 milliGRAM(s) Oral at bedtime  thiamine 100 milliGRAM(s) Oral daily    MEDICATIONS  (PRN):  acetaminophen     Tablet .. 650 milliGRAM(s) Oral every 6 hours PRN Mild Pain (1 - 3), Moderate Pain (4 - 6)      CAPILLARY BLOOD GLUCOSE        I&O's Summary      PHYSICAL EXAM:  Vital Signs Last 24 Hrs  T(C): 36.8 (16 Dec 2022 11:08), Max: 36.9 (15 Dec 2022 16:39)  T(F): 98.3 (16 Dec 2022 11:08), Max: 98.5 (15 Dec 2022 16:39)  HR: 68 (16 Dec 2022 11:08) (63 - 76)  BP: 98/65 (16 Dec 2022 11:08) (98/65 - 117/72)  BP(mean): --  RR: 18 (16 Dec 2022 11:08) (17 - 18)  SpO2: 96% (16 Dec 2022 11:08) (95% - 98%)    Parameters below as of 16 Dec 2022 11:08  Patient On (Oxygen Delivery Method): room air  General: NAD, awake and alert  Eyes: PERRLA, conjunctiva clear, nonicteric sclera  HENMT: MMM  Respiratory: No respiratory distress, CTABL, No rales, rhonchi, wheezing.  Cardiovascular: S1,S2; Regular rate and rhythm; No m/g/r. 2+ peripheral pulses  Gastrointestinal: Soft, Nontender, Nondistended; +BS.   Extremities: No c/c/e; warm to touch  Neurological: Strength: CN Ii-XII intact. 5/5 bilateral handgrip, biceps, quadriceps hamstrings, calf. Sensation to LT grossly in tact in all 4 extremities.  Skin: No rashes, No erythema   Psych: AAOx2, pleasant, in good spirits      LABS:                        12.6   5.14  )-----------( 206      ( 16 Dec 2022 05:55 )             38.7     12-16    139  |  105  |  19  ----------------------------<  87  3.3<L>   |  24  |  0.97    Ca    8.6      16 Dec 2022 05:55  Phos  2.9     12-16  Mg     2.00     12-16          RADIOLOGY & ADDITIONAL TESTS:  Results Reviewed:   Imaging Personally Reviewed:  < from: MR Head No Cont (12.15.22 @ 12:15) >  Findings favor the presence of hemorrhagic infarct in the right frontal   parasagittal region with some scattered areas of diffusion hyperintensity   also in the right high NAIMA territory more peripherally. However, contrast   study is recommended to exclude less likely possibility of an underlying   hemorrhagic mass lesion though again acute infarct with hemorrhage is   favored. Left frontal dural based soft tissue lesion without significant   mass effect or edema consistent with a meningioma is appreciated.    Electrocardiogram Personally Reviewed:    COORDINATION OF CARE:  Care Discussed with Consultants/Other Providers [Y/N]: laisha castle, neuro resident, dc aspirin, neurosurgery consult   Prior or Outpatient Records Reviewed [Y/N]:

## 2022-12-16 NOTE — DISCHARGE NOTE NURSING/CASE MANAGEMENT/SOCIAL WORK - NSDCPEFALRISK_GEN_ALL_CORE
For information on Fall & Injury Prevention, visit: [email or web address] OR  [email or web address] OR  https://www.cdc.gov/steadi/patient.html For information on Fall & Injury Prevention, visit: [email or web address]://[email or web address] OR  [email or web address]://[email or web address] OR  https://www.cdc.gov/steadi/patient.html For information on Fall & Injury Prevention, visit: [email or web address]://[email or web address] OR  [email or web address]://[email or web address] OR  https://www.cdc.gov/steadi/patient.html

## 2022-12-16 NOTE — PROGRESS NOTE ADULT - PROBLEM SELECTOR PLAN 1
- presented with reports of L-sided weakness and is s/p Stroke Code in the ED  - patient reports dizziness, including at rest, frequent falls - associated with head trauma as well  - passed dysphagia screen, regular diet  - continue neuro checks, monitor tele   - FOUND TO HAVE ACUTE INFARCT  MRI c-spine (12/15) multilevel djd of c-spine and MRI brain favor the presence of hemorrhagic infarct in the right frontal parasagittal region with some scattered areas of diffusion hyperintensity also in the right high NAIMA territory more peripherally. However, contrast study is recommended to exclude less likely possibility of an underlying hemorrhagic mass lesion though again acute infarct with hemorrhage is favored. Left frontal dural based soft tissue lesion without significant mass effect or edema consistent with a meningioma is appreciated.  - neuro stroke team f/u. I discussed with neuro yesterday f/u recs, may consider contrast MRI Brain, for now okay to continue ASA  -Echo w/ bubble study (12/12) normal LV/RV function, LVEF 67%, 1+MR, no PFO  - PT recs [facility] vs. home with home PT  -Updated pt's daughter on 12/15, she's agreeable with rehab  - DISPO: DC if cleared by neuro and no further neuro intervention. - presented with reports of L-sided weakness and is s/p Stroke Code in the ED  - patient reports dizziness, including at rest, frequent falls - associated with head trauma as well  - passed dysphagia screen, regular diet  - continue neuro checks, monitor tele   - FOUND TO HAVE ACUTE INFARCT  MRI c-spine (12/15) multilevel djd of c-spine and MRI brain favor the presence of hemorrhagic infarct in the right frontal parasagittal region with some scattered areas of diffusion hyperintensity also in the right high NAIMA territory more peripherally. However, contrast study is recommended to exclude less likely possibility of an underlying hemorrhagic mass lesion though again acute infarct with hemorrhage is favored. Left frontal dural based soft tissue lesion without significant mass effect or edema consistent with a meningioma is appreciated.  - neuro stroke team f/u. I discussed with neuro resident today, advised to dc ASA and call neurosurgery consult, may need contrast MRI but can be done as outpt   -Echo w/ bubble study (12/12) normal LV/RV function, LVEF 67%, 1+MR, no PFO  - PT recs [facility] vs. home with home PT  -Updated pt's daughter on 12/15, she's agreeable with rehab  - DISPO: DC if cleared by neuro and no further neuro intervention.

## 2022-12-16 NOTE — CONSULT NOTE ADULT - SUBJECTIVE AND OBJECTIVE BOX
SUBJECTIVE EVENTS:    Vital Signs Last 24 Hrs  T(C): 36.8 (16 Dec 2022 11:08), Max: 36.8 (16 Dec 2022 04:30)  T(F): 98.3 (16 Dec 2022 11:08), Max: 98.3 (16 Dec 2022 11:08)  HR: 68 (16 Dec 2022 11:08) (63 - 76)  BP: 98/65 (16 Dec 2022 11:08) (98/65 - 117/72)  BP(mean): --  RR: 18 (16 Dec 2022 11:08) (17 - 18)  SpO2: 96% (16 Dec 2022 11:08) (95% - 96%)    Parameters below as of 16 Dec 2022 11:08  Patient On (Oxygen Delivery Method): room air          PHYSICAL EXAM:  General: Well-developed, well nourished, in no acute distress.  Eyes: Conjunctiva and sclera clear.  Neck: supple, nontender, good ROM  Lungs: no respiratory distress  Neurologic:  - Mental Status:  Alert, awake, oriented to person, place, and time; Speech is fluent with intact naming, repetition, and comprehension but limited due to language barrier   - Cranial Nerves II-XII:  VFF, No nystagmus or APD noted, EOMI, PERRLA, V1-V3 intact, no facial asymmetry, t/p midline, SCM/trap intact.  - Motor: reduced muscle bulk and mildly diffusely increased tone throughout. Mild LUE proximal drift      L/R         Deltoid  4+/5    Biceps   5/5      Triceps  4+/5         Wrist Extension 5/5   Wrist Flexion  5/5      5/5   L/R         Hip Flexion  3/5    Knee Extension  5/5  Dorsiflexion  5/5      Plantar Flexion 5/5     - Sensory:  Intact to light touch, pin prick, vibration, and joint-position sense throughout.  - Coordination:  Finger-nose-finger and heel-knee-shin intact without dysmetria.  Rapid alternating hand movements intact.      INCISION:   EVD/Post op Drain OUTPUT:    DIET:      MEDICATIONS  (STANDING):  atorvastatin 80 milliGRAM(s) Oral at bedtime  enoxaparin Injectable 40 milliGRAM(s) SubCutaneous every 24 hours  gabapentin 300 milliGRAM(s) Oral daily  multivitamin 1 Tablet(s) Oral daily  pancrelipase  (CREON 24,000 Lipase Units) 1 Capsule(s) Oral three times a day with meals  pantoprazole    Tablet 40 milliGRAM(s) Oral before breakfast  potassium chloride    Tablet ER 40 milliEquivalent(s) Oral every 4 hours  sertraline 25 milliGRAM(s) Oral daily  tamsulosin 0.4 milliGRAM(s) Oral at bedtime  thiamine 100 milliGRAM(s) Oral daily    MEDICATIONS  (PRN):  acetaminophen     Tablet .. 650 milliGRAM(s) Oral every 6 hours PRN Mild Pain (1 - 3), Moderate Pain (4 - 6)                            12.6   5.14  )-----------( 206      ( 16 Dec 2022 05:55 )             38.7   12-16    139  |  105  |  19  ----------------------------<  87  3.3<L>   |  24  |  0.97    Ca    8.6      16 Dec 2022 05:55  Phos  2.9     12-16  Mg     2.00     12-16          RADIOLGY:   < from: MR Head No Cont (12.15.22 @ 12:15) >  COMPARISON EXAMINATION:  None.    FINDINGS:  VENTRICLES AND SULCI:  Age-appropriate involutional changes.  INTRA-AXIAL:  Evidence of diffusion hyperintensity with associated   susceptibility and T1 signal hyperintensity in the distal left NAIMA   territory with some scattered subtle punctate areas of diffusion   hyperintensity more peripherally in the distal right NAIMA territory   suspicious for recent infarct with hemorrhagic component though recommend   contrast study to exclude less likely possibility of possible hemorrhagic   underlying lesion with surrounding edema. Again the former of infarct is   favored especially in light of the more punctate foci in the adjacent   region. Old small regions of infarct in the left cerebellum  EXTRA-AXIAL:  Left frontal extra-axial lesion that is soft tissue signal   T1 soft tissue signal T2 measuring 1.5 x 1.1 x 1.1 cm ( APxTRxCC).  VISUALIZED SINUSES: Bilateral maxillary sinus mucosal disease with a left   maxillary sinus fluid level.  VISUALIZED MASTOIDS:  Clear.  CALVARIUM:  Normal.  CAROTID FLOW VOIDS:  Present.  MISCELLANEOUS:  None.      IMPRESSION:  Findings favor the presence of hemorrhagic infarct in the right frontal   parasagittal region with some scattered areas of diffusion hyperintensity   also in the right high NAIMA territory more peripherally. However, contrast   study is recommended to exclude less likely possibility of an underlying   hemorrhagic mass lesion though again acute infarct with hemorrhage is   favored. Left frontal dural based soft tissue lesion without significant   mass effect or edema consistent with a meningioma is appreciated.    < end of copied text >

## 2022-12-16 NOTE — DISCHARGE NOTE NURSING/CASE MANAGEMENT/SOCIAL WORK - PATIENT PORTAL LINK FT
You can access the FollowMyHealth Patient Portal offered by [facility] by registering at the following website: [email or web address]. By joining [employer]’s FollowMyHealth portal, you will also be able to view your health information using other applications (apps) compatible with our system. You can access the FollowMyHealth Patient Portal offered by [facility] by registering at the following website: [email or web address]. By joining [employer]’s FollowMyHealth portal, you will also be able to view your health information using other applications (apps) compatible with our system. You can access the FollowMyHealth Patient Portal offered by [facility] by registering at the following website: [email or web address]. By joining [employer]’s FollowMyHealth portal, you will also be able to view your health information using other applications (apps) compatible with our system.

## 2022-12-17 LAB
ANION GAP SERPL CALC-SCNC: 9 MMOL/L — SIGNIFICANT CHANGE UP (ref 7–14)
BUN SERPL-MCNC: 15 MG/DL — SIGNIFICANT CHANGE UP (ref 7–23)
CALCIUM SERPL-MCNC: 8.5 MG/DL — SIGNIFICANT CHANGE UP (ref 8.4–10.5)
CHLORIDE SERPL-SCNC: 108 MMOL/L — HIGH (ref 98–107)
CO2 SERPL-SCNC: 25 MMOL/L — SIGNIFICANT CHANGE UP (ref 22–31)
CREAT SERPL-MCNC: 0.94 MG/DL — SIGNIFICANT CHANGE UP (ref 0.5–1.3)
EGFR: 78 ML/MIN/1.73M2 — SIGNIFICANT CHANGE UP
GLUCOSE SERPL-MCNC: 96 MG/DL — SIGNIFICANT CHANGE UP (ref 70–99)
HCT VFR BLD CALC: 37.1 % — LOW (ref 39–50)
HGB BLD-MCNC: 12.2 G/DL — LOW (ref 13–17)
MAGNESIUM SERPL-MCNC: 2.2 MG/DL — SIGNIFICANT CHANGE UP (ref 1.6–2.6)
MCHC RBC-ENTMCNC: 32.9 GM/DL — SIGNIFICANT CHANGE UP (ref 32–36)
MCHC RBC-ENTMCNC: 33.1 PG — SIGNIFICANT CHANGE UP (ref 27–34)
MCV RBC AUTO: 100.5 FL — HIGH (ref 80–100)
NRBC # BLD: 0 /100 WBCS — SIGNIFICANT CHANGE UP (ref 0–0)
NRBC # FLD: 0 K/UL — SIGNIFICANT CHANGE UP (ref 0–0)
PHOSPHATE SERPL-MCNC: 2.7 MG/DL — SIGNIFICANT CHANGE UP (ref 2.5–4.5)
PLATELET # BLD AUTO: 210 K/UL — SIGNIFICANT CHANGE UP (ref 150–400)
POTASSIUM SERPL-MCNC: 4.1 MMOL/L — SIGNIFICANT CHANGE UP (ref 3.5–5.3)
POTASSIUM SERPL-SCNC: 4.1 MMOL/L — SIGNIFICANT CHANGE UP (ref 3.5–5.3)
RBC # BLD: 3.69 M/UL — LOW (ref 4.2–5.8)
RBC # FLD: 14 % — SIGNIFICANT CHANGE UP (ref 10.3–14.5)
SODIUM SERPL-SCNC: 142 MMOL/L — SIGNIFICANT CHANGE UP (ref 135–145)
WBC # BLD: 5.35 K/UL — SIGNIFICANT CHANGE UP (ref 3.8–10.5)
WBC # FLD AUTO: 5.35 K/UL — SIGNIFICANT CHANGE UP (ref 3.8–10.5)

## 2022-12-17 PROCEDURE — 99233 SBSQ HOSP IP/OBS HIGH 50: CPT

## 2022-12-17 RX ADMIN — Medication 1 CAPSULE(S): at 18:01

## 2022-12-17 RX ADMIN — Medication 1 CAPSULE(S): at 12:39

## 2022-12-17 RX ADMIN — Medication 1 TABLET(S): at 12:39

## 2022-12-17 RX ADMIN — ATORVASTATIN CALCIUM 80 MILLIGRAM(S): 80 TABLET, FILM COATED ORAL at 21:57

## 2022-12-17 RX ADMIN — GABAPENTIN 300 MILLIGRAM(S): 400 CAPSULE ORAL at 12:38

## 2022-12-17 RX ADMIN — TAMSULOSIN HYDROCHLORIDE 0.4 MILLIGRAM(S): 0.4 CAPSULE ORAL at 21:57

## 2022-12-17 RX ADMIN — Medication 100 MILLIGRAM(S): at 12:39

## 2022-12-17 RX ADMIN — PANTOPRAZOLE SODIUM 40 MILLIGRAM(S): 20 TABLET, DELAYED RELEASE ORAL at 06:30

## 2022-12-17 RX ADMIN — SERTRALINE 25 MILLIGRAM(S): 25 TABLET, FILM COATED ORAL at 12:39

## 2022-12-17 NOTE — PROGRESS NOTE ADULT - SUBJECTIVE AND OBJECTIVE BOX
Dr. Federica Horton  Pager [phone]    PROGRESS NOTE:     Patient is a 87y old  Male who presents with a chief complaint of Generalized weakness (16 Dec 2022 17:08)      SUBJECTIVE / OVERNIGHT EVENTS: denies chest pain or sob  ADDITIONAL REVIEW OF SYSTEMS: afebrile, wants to go home soon    MEDICATIONS  (STANDING):  atorvastatin 80 milliGRAM(s) Oral at bedtime  enoxaparin Injectable 40 milliGRAM(s) SubCutaneous every 24 hours  gabapentin 300 milliGRAM(s) Oral daily  multivitamin 1 Tablet(s) Oral daily  pancrelipase  (CREON 24,000 Lipase Units) 1 Capsule(s) Oral three times a day with meals  pantoprazole    Tablet 40 milliGRAM(s) Oral before breakfast  sertraline 25 milliGRAM(s) Oral daily  tamsulosin 0.4 milliGRAM(s) Oral at bedtime  thiamine 100 milliGRAM(s) Oral daily    MEDICATIONS  (PRN):  acetaminophen     Tablet .. 650 milliGRAM(s) Oral every 6 hours PRN Mild Pain (1 - 3), Moderate Pain (4 - 6)      CAPILLARY BLOOD GLUCOSE        I&O's Summary      PHYSICAL EXAM:  Vital Signs Last 24 Hrs  T(C): 36.8 (17 Dec 2022 06:20), Max: 36.8 (16 Dec 2022 11:08)  T(F): 98.2 (17 Dec 2022 06:20), Max: 98.3 (16 Dec 2022 11:08)  HR: 64 (17 Dec 2022 06:20) (64 - 81)  BP: 106/59 (17 Dec 2022 06:20) (98/65 - 106/69)  BP(mean): --  RR: 17 (17 Dec 2022 06:20) (17 - 18)  SpO2: 99% (17 Dec 2022 06:20) (96% - 99%)    Parameters below as of 17 Dec 2022 06:20  Patient On (Oxygen Delivery Method): room air    General: NAD, awake and alert, thin frail man  Eyes: PERRLA, conjunctiva clear, nonicteric sclera  HENMT: MMM  Respiratory: No respiratory distress, CTABL, No rales, rhonchi, wheezing.  Cardiovascular: S1,S2; Regular rate and rhythm; No m/g/r. 2+ peripheral pulses  Gastrointestinal: Soft, Nontender, Nondistended; +BS.   Extremities: No c/c/e; warm to touch  Neurological: Strength: CN Ii-XII intact. 5/5 bilateral handgrip, biceps, quadriceps hamstrings, calf. Sensation to LT grossly in tact in all 4 extremities.  Skin: No rashes, No erythema   Psych: AAOx2, pleasant, in good spirits  LABS:                        12.2   5.35  )-----------( 210      ( 17 Dec 2022 06:29 )             37.1     12-17    142  |  108<H>  |  15  ----------------------------<  96  4.1   |  25  |  0.94    Ca    8.5      17 Dec 2022 06:29  Phos  2.7     12-17  Mg     2.20     12-17        RADIOLOGY & ADDITIONAL TESTS:  Results Reviewed:   Imaging Personally Reviewed:    Electrocardiogram Personally Reviewed:    COORDINATION OF CARE:  Care Discussed with Consultants/Other Providers [Y/N]: acp Bear, rehab , pmr consult, mri w/ contrast ordered but can be done as outpt  Prior or Outpatient Records Reviewed [Y/N]:   Dr. Federica Horton  Pager [phone]    PROGRESS NOTE:     Patient is a 87y old  Male who presents with a chief complaint of Generalized weakness (16 Dec 2022 17:08)      SUBJECTIVE / OVERNIGHT EVENTS: denies chest pain or sob  ADDITIONAL REVIEW OF SYSTEMS: afebrile, wants to go home soon    MEDICATIONS  (STANDING):  atorvastatin 80 milliGRAM(s) Oral at bedtime  enoxaparin Injectable 40 milliGRAM(s) SubCutaneous every 24 hours  gabapentin 300 milliGRAM(s) Oral daily  multivitamin 1 Tablet(s) Oral daily  pancrelipase  (CREON 24,000 Lipase Units) 1 Capsule(s) Oral three times a day with meals  pantoprazole    Tablet 40 milliGRAM(s) Oral before breakfast  sertraline 25 milliGRAM(s) Oral daily  tamsulosin 0.4 milliGRAM(s) Oral at bedtime  thiamine 100 milliGRAM(s) Oral daily    MEDICATIONS  (PRN):  acetaminophen     Tablet .. 650 milliGRAM(s) Oral every 6 hours PRN Mild Pain (1 - 3), Moderate Pain (4 - 6)      CAPILLARY BLOOD GLUCOSE        I&O's Summary      PHYSICAL EXAM:  Vital Signs Last 24 Hrs  T(C): 36.8 (17 Dec 2022 06:20), Max: 36.8 (16 Dec 2022 11:08)  T(F): 98.2 (17 Dec 2022 06:20), Max: 98.3 (16 Dec 2022 11:08)  HR: 64 (17 Dec 2022 06:20) (64 - 81)  BP: 106/59 (17 Dec 2022 06:20) (98/65 - 106/69)  BP(mean): --  RR: 17 (17 Dec 2022 06:20) (17 - 18)  SpO2: 99% (17 Dec 2022 06:20) (96% - 99%)    Parameters below as of 17 Dec 2022 06:20  Patient On (Oxygen Delivery Method): room air    General: NAD, awake and alert, thin frail man  Eyes: PERRLA, conjunctiva clear, nonicteric sclera  HENMT: MMM  Respiratory: No respiratory distress, CTABL, No rales, rhonchi, wheezing.  Cardiovascular: S1,S2; Regular rate and rhythm; No m/g/r. 2+ peripheral pulses  Gastrointestinal: Soft, Nontender, Nondistended; +BS.   Extremities: No c/c/e; warm to touch  Neurological: Strength: CN Ii-XII intact. 5/5 bilateral handgrip, biceps, quadriceps hamstrings, calf. Sensation to LT grossly in tact in all 4 extremities.  Skin: No rashes, No erythema   Psych: AAOx2, pleasant, in good spirits  LABS:                        12.2   5.35  )-----------( 210      ( 17 Dec 2022 06:29 )             37.1     12-17    142  |  108<H>  |  15  ----------------------------<  96  4.1   |  25  |  0.94    Ca    8.5      17 Dec 2022 06:29  Phos  2.7     12-17  Mg     2.20     12-17        RADIOLOGY & ADDITIONAL TESTS:  Results Reviewed:   Imaging Personally Reviewed:    Electrocardiogram Personally Reviewed:    COORDINATION OF CARE:  Care Discussed with Consultants/Other Providers [Y/N]: acp Bear, rehab , pmr consult, mri w/ contrast ordered but can be done as outpt  Prior or Outpatient Records Reviewed [Y/N]:   Dr. Federica Horton  Pager [phone]    PROGRESS NOTE:     Patient is a 87y old  Male who presents with a chief complaint of Generalized weakness (16 Dec 2022 17:08)      SUBJECTIVE / OVERNIGHT EVENTS: denies chest pain or sob  ADDITIONAL REVIEW OF SYSTEMS: afebrile, wants to go home soon    MEDICATIONS  (STANDING):  atorvastatin 80 milliGRAM(s) Oral at bedtime  enoxaparin Injectable 40 milliGRAM(s) SubCutaneous every 24 hours  gabapentin 300 milliGRAM(s) Oral daily  multivitamin 1 Tablet(s) Oral daily  pancrelipase  (CREON 24,000 Lipase Units) 1 Capsule(s) Oral three times a day with meals  pantoprazole    Tablet 40 milliGRAM(s) Oral before breakfast  sertraline 25 milliGRAM(s) Oral daily  tamsulosin 0.4 milliGRAM(s) Oral at bedtime  thiamine 100 milliGRAM(s) Oral daily    MEDICATIONS  (PRN):  acetaminophen     Tablet .. 650 milliGRAM(s) Oral every 6 hours PRN Mild Pain (1 - 3), Moderate Pain (4 - 6)      CAPILLARY BLOOD GLUCOSE        I&O's Summary      PHYSICAL EXAM:  Vital Signs Last 24 Hrs  T(C): 36.8 (17 Dec 2022 06:20), Max: 36.8 (16 Dec 2022 11:08)  T(F): 98.2 (17 Dec 2022 06:20), Max: 98.3 (16 Dec 2022 11:08)  HR: 64 (17 Dec 2022 06:20) (64 - 81)  BP: 106/59 (17 Dec 2022 06:20) (98/65 - 106/69)  BP(mean): --  RR: 17 (17 Dec 2022 06:20) (17 - 18)  SpO2: 99% (17 Dec 2022 06:20) (96% - 99%)    Parameters below as of 17 Dec 2022 06:20  Patient On (Oxygen Delivery Method): room air    General: NAD, awake and alert, thin frail man  Eyes: PERRLA, conjunctiva clear, nonicteric sclera  HENMT: MMM  Respiratory: No respiratory distress, CTABL, No rales, rhonchi, wheezing.  Cardiovascular: S1,S2; Regular rate and rhythm; No m/g/r. 2+ peripheral pulses  Gastrointestinal: Soft, Nontender, Nondistended; +BS.   Extremities: No c/c/e; warm to touch  Neurological: Strength: CN Ii-XII intact. 5/5 bilateral handgrip, biceps, quadriceps hamstrings, calf. Sensation to LT grossly in tact in all 4 extremities.  Skin: No rashes, No erythema   Psych: AAOx2, pleasant, in good spirits  LABS:                        12.2   5.35  )-----------( 210      ( 17 Dec 2022 06:29 )             37.1     12-17    142  |  108<H>  |  15  ----------------------------<  96  4.1   |  25  |  0.94    Ca    8.5      17 Dec 2022 06:29  Phos  2.7     12-17  Mg     2.20     12-17        RADIOLOGY & ADDITIONAL TESTS:  Results Reviewed:   Imaging Personally Reviewed:    Electrocardiogram Personally Reviewed:    COORDINATION OF CARE:  Care Discussed with Consultants/Other Providers [Y/N]: acp Bear, rehab , pmr consult, mri w/ contrast ordered but can be done as outpt  Prior or Outpatient Records Reviewed [Y/N]:

## 2022-12-17 NOTE — PROGRESS NOTE ADULT - PROBLEM SELECTOR PLAN 1
- presented with reports of L-sided weakness and is s/p Stroke Code in the ED  - patient reports dizziness, including at rest, frequent falls - associated with head trauma as well  - passed dysphagia screen, regular diet  - continue neuro checks, monitor tele   - FOUND TO HAVE ACUTE INFARCT  MRI c-spine (12/15) multilevel djd of c-spine and MRI brain favor the presence of hemorrhagic infarct in the right frontal parasagittal region with some scattered areas of diffusion hyperintensity also in the right high NAIMA territory more peripherally. However, contrast study is recommended to exclude less likely possibility of an underlying hemorrhagic mass lesion though again acute infarct with hemorrhage is favored. Left frontal dural based soft tissue lesion without significant mass effect or edema consistent with a meningioma is appreciated.  - neurosurgery/neuro recs appreciated, MRI brain +/- contrast ordered but can be done as outpt, can be discharged to rehab if bed available  - ASA dc'd per neuro, c/w statin  -Echo w/ bubble study (12/12) normal LV/RV function, LVEF 67%, 1+MR, no PFO  - PT recs MARIAELENA   -Updated pt's daughter on 12/15, she's agreeable with rehab  - DISPO: DC to rehab, F/u SW. PMR consult. Updated daughter on 12/16, pt with falls at home, now hemorrhagic stroke, NOT safe for DC home with home PT.

## 2022-12-17 NOTE — PROGRESS NOTE ADULT - PROBLEM SELECTOR PLAN 4
- [id number] score = 5  - not on AC PTA (due to repeated falls, including w/ head trauma)  - holding home BB - [id number] score = 5  - not on AC PTA (due to repeated falls, including w/ head trauma)  - holding home BB - [id number] score = 5  - not on AC PTA (due to repeated falls, including w/ head trauma)  - holding home BB

## 2022-12-18 LAB
ANION GAP SERPL CALC-SCNC: 9 MMOL/L — SIGNIFICANT CHANGE UP (ref 7–14)
BUN SERPL-MCNC: 16 MG/DL — SIGNIFICANT CHANGE UP (ref 7–23)
CALCIUM SERPL-MCNC: 8.5 MG/DL — SIGNIFICANT CHANGE UP (ref 8.4–10.5)
CHLORIDE SERPL-SCNC: 106 MMOL/L — SIGNIFICANT CHANGE UP (ref 98–107)
CO2 SERPL-SCNC: 25 MMOL/L — SIGNIFICANT CHANGE UP (ref 22–31)
CREAT SERPL-MCNC: 0.9 MG/DL — SIGNIFICANT CHANGE UP (ref 0.5–1.3)
EGFR: 83 ML/MIN/1.73M2 — SIGNIFICANT CHANGE UP
GLUCOSE SERPL-MCNC: 90 MG/DL — SIGNIFICANT CHANGE UP (ref 70–99)
HCT VFR BLD CALC: 37.7 % — LOW (ref 39–50)
HGB BLD-MCNC: 12.2 G/DL — LOW (ref 13–17)
MAGNESIUM SERPL-MCNC: 2.2 MG/DL — SIGNIFICANT CHANGE UP (ref 1.6–2.6)
MCHC RBC-ENTMCNC: 32.4 GM/DL — SIGNIFICANT CHANGE UP (ref 32–36)
MCHC RBC-ENTMCNC: 32.5 PG — SIGNIFICANT CHANGE UP (ref 27–34)
MCV RBC AUTO: 100.5 FL — HIGH (ref 80–100)
NRBC # BLD: 0 /100 WBCS — SIGNIFICANT CHANGE UP (ref 0–0)
NRBC # FLD: 0 K/UL — SIGNIFICANT CHANGE UP (ref 0–0)
PHOSPHATE SERPL-MCNC: 2.9 MG/DL — SIGNIFICANT CHANGE UP (ref 2.5–4.5)
PLATELET # BLD AUTO: 217 K/UL — SIGNIFICANT CHANGE UP (ref 150–400)
POTASSIUM SERPL-MCNC: 3.8 MMOL/L — SIGNIFICANT CHANGE UP (ref 3.5–5.3)
POTASSIUM SERPL-SCNC: 3.8 MMOL/L — SIGNIFICANT CHANGE UP (ref 3.5–5.3)
RBC # BLD: 3.75 M/UL — LOW (ref 4.2–5.8)
RBC # FLD: 13.7 % — SIGNIFICANT CHANGE UP (ref 10.3–14.5)
SODIUM SERPL-SCNC: 140 MMOL/L — SIGNIFICANT CHANGE UP (ref 135–145)
WBC # BLD: 6.29 K/UL — SIGNIFICANT CHANGE UP (ref 3.8–10.5)
WBC # FLD AUTO: 6.29 K/UL — SIGNIFICANT CHANGE UP (ref 3.8–10.5)

## 2022-12-18 PROCEDURE — 99232 SBSQ HOSP IP/OBS MODERATE 35: CPT

## 2022-12-18 RX ADMIN — Medication 100 MILLIGRAM(S): at 13:14

## 2022-12-18 RX ADMIN — Medication 1 CAPSULE(S): at 13:14

## 2022-12-18 RX ADMIN — PANTOPRAZOLE SODIUM 40 MILLIGRAM(S): 20 TABLET, DELAYED RELEASE ORAL at 06:31

## 2022-12-18 RX ADMIN — Medication 1 TABLET(S): at 13:14

## 2022-12-18 RX ADMIN — Medication 1 CAPSULE(S): at 18:10

## 2022-12-18 RX ADMIN — ATORVASTATIN CALCIUM 80 MILLIGRAM(S): 80 TABLET, FILM COATED ORAL at 21:11

## 2022-12-18 RX ADMIN — GABAPENTIN 300 MILLIGRAM(S): 400 CAPSULE ORAL at 13:14

## 2022-12-18 RX ADMIN — SERTRALINE 25 MILLIGRAM(S): 25 TABLET, FILM COATED ORAL at 13:14

## 2022-12-18 RX ADMIN — TAMSULOSIN HYDROCHLORIDE 0.4 MILLIGRAM(S): 0.4 CAPSULE ORAL at 21:11

## 2022-12-18 RX ADMIN — ENOXAPARIN SODIUM 40 MILLIGRAM(S): 100 INJECTION SUBCUTANEOUS at 23:54

## 2022-12-18 RX ADMIN — Medication 1 CAPSULE(S): at 09:06

## 2022-12-18 RX ADMIN — ENOXAPARIN SODIUM 40 MILLIGRAM(S): 100 INJECTION SUBCUTANEOUS at 00:08

## 2022-12-18 NOTE — PROGRESS NOTE ADULT - SUBJECTIVE AND OBJECTIVE BOX
Dr. Federica Horton  Pager [phone]    PROGRESS NOTE:     Patient is a 87y old  Male who presents with a chief complaint of Generalized weakness (17 Dec 2022 10:13)      SUBJECTIVE / OVERNIGHT EVENTS: denies chest pain or sob  ADDITIONAL REVIEW OF SYSTEMS: afebrile     MEDICATIONS  (STANDING):  atorvastatin 80 milliGRAM(s) Oral at bedtime  enoxaparin Injectable 40 milliGRAM(s) SubCutaneous every 24 hours  gabapentin 300 milliGRAM(s) Oral daily  multivitamin 1 Tablet(s) Oral daily  pancrelipase  (CREON 24,000 Lipase Units) 1 Capsule(s) Oral three times a day with meals  pantoprazole    Tablet 40 milliGRAM(s) Oral before breakfast  sertraline 25 milliGRAM(s) Oral daily  tamsulosin 0.4 milliGRAM(s) Oral at bedtime  thiamine 100 milliGRAM(s) Oral daily    MEDICATIONS  (PRN):  acetaminophen     Tablet .. 650 milliGRAM(s) Oral every 6 hours PRN Mild Pain (1 - 3), Moderate Pain (4 - 6)      CAPILLARY BLOOD GLUCOSE        I&O's Summary      PHYSICAL EXAM:  Vital Signs Last 24 Hrs  T(C): 36.4 (17 Dec 2022 21:29), Max: 36.6 (17 Dec 2022 11:42)  T(F): 97.5 (17 Dec 2022 21:29), Max: 97.8 (17 Dec 2022 11:42)  HR: 75 (18 Dec 2022 06:30) (67 - 75)  BP: 103/58 (18 Dec 2022 06:30) (103/58 - 115/76)  BP(mean): --  RR: 18 (18 Dec 2022 06:30) (17 - 18)  SpO2: 97% (18 Dec 2022 06:30) (95% - 97%)    Parameters below as of 18 Dec 2022 06:30  Patient On (Oxygen Delivery Method): room air        General: NAD, awake and alert, thin frail man  Eyes: PERRLA, conjunctiva clear, nonicteric sclera  HENMT: MMM  Respiratory: No respiratory distress, CTABL, No rales, rhonchi, wheezing.  Cardiovascular: S1,S2; Regular rate and rhythm; No m/g/r. 2+ peripheral pulses  Gastrointestinal: Soft, Nontender, Nondistended; +BS.   Extremities: No c/c/e; warm to touch  Neurological: Strength: CN Ii-XII intact. 5/5 bilateral handgrip, biceps, quadriceps hamstrings, calf. Sensation to LT grossly in tact in all 4 extremities.  Skin: No rashes, No erythema   Psych: AAOx2, pleasant, in good spirits    LABS:                        12.2   6.29  )-----------( 217      ( 18 Dec 2022 05:55 )             37.7     12-18    140  |  106  |  16  ----------------------------<  90  3.8   |  25  |  0.90    Ca    8.5      18 Dec 2022 05:55  Phos  2.9     12-18  Mg     2.20     12-18                  RADIOLOGY & ADDITIONAL TESTS:  Results Reviewed:   Imaging Personally Reviewed:  Electrocardiogram Personally Reviewed:    COORDINATION OF CARE:  Care Discussed with Consultants/Other Providers [Y/N]:  Prior or Outpatient Records Reviewed [Y/N]:   Dr. Federica Horton  Pager [phone]    PROGRESS NOTE:     Patient is a 87y old  Male who presents with a chief complaint of Generalized weakness (17 Dec 2022 10:13)      SUBJECTIVE / OVERNIGHT EVENTS: denies chest pain or sob  ADDITIONAL REVIEW OF SYSTEMS: afebrile     MEDICATIONS  (STANDING):  atorvastatin 80 milliGRAM(s) Oral at bedtime  enoxaparin Injectable 40 milliGRAM(s) SubCutaneous every 24 hours  gabapentin 300 milliGRAM(s) Oral daily  multivitamin 1 Tablet(s) Oral daily  pancrelipase  (CREON 24,000 Lipase Units) 1 Capsule(s) Oral three times a day with meals  pantoprazole    Tablet 40 milliGRAM(s) Oral before breakfast  sertraline 25 milliGRAM(s) Oral daily  tamsulosin 0.4 milliGRAM(s) Oral at bedtime  thiamine 100 milliGRAM(s) Oral daily    MEDICATIONS  (PRN):  acetaminophen     Tablet .. 650 milliGRAM(s) Oral every 6 hours PRN Mild Pain (1 - 3), Moderate Pain (4 - 6)      CAPILLARY BLOOD GLUCOSE        I&O's Summary      PHYSICAL EXAM:  Vital Signs Last 24 Hrs  T(C): 36.4 (17 Dec 2022 21:29), Max: 36.6 (17 Dec 2022 11:42)  T(F): 97.5 (17 Dec 2022 21:29), Max: 97.8 (17 Dec 2022 11:42)  HR: 75 (18 Dec 2022 06:30) (67 - 75)  BP: 103/58 (18 Dec 2022 06:30) (103/58 - 115/76)  BP(mean): --  RR: 18 (18 Dec 2022 06:30) (17 - 18)  SpO2: 97% (18 Dec 2022 06:30) (95% - 97%)    Parameters below as of 18 Dec 2022 06:30  Patient On (Oxygen Delivery Method): room air        General: NAD, awake and alert, thin frail man  Eyes: PERRLA, conjunctiva clear, nonicteric sclera  HENMT: MMM  Respiratory: No respiratory distress, CTABL, No rales, rhonchi, wheezing.  Cardiovascular: S1,S2; Regular rate and rhythm; No m/g/r. 2+ peripheral pulses  Gastrointestinal: Soft, Nontender, Nondistended; +BS.   Extremities: No c/c/e; warm to touch  Neurological: Strength: CN Ii-XII intact. 5/5 bilateral handgrip, biceps, quadriceps hamstrings, calf. Sensation to LT grossly in tact in all 4 extremities.  Skin: No rashes, No erythema   Psych: AAOx2, pleasant, in good spirits    LABS:                        12.2   6.29  )-----------( 217      ( 18 Dec 2022 05:55 )             37.7     12-18    140  |  106  |  16  ----------------------------<  90  3.8   |  25  |  0.90    Ca    8.5      18 Dec 2022 05:55  Phos  2.9     12-18  Mg     2.20     12-18                  RADIOLOGY & ADDITIONAL TESTS:  Results Reviewed:   Imaging Personally Reviewed:  Electrocardiogram Personally Reviewed:    COORDINATION OF CARE:  Care Discussed with Consultants/Other Providers [Y/N]:  Prior or Outpatient Records Reviewed [Y/N]:   Dr. Federica Horton  Pager [phone]    PROGRESS NOTE:     Patient is a 87y old  Male who presents with a chief complaint of Generalized weakness (17 Dec 2022 10:13)      SUBJECTIVE / OVERNIGHT EVENTS: denies chest pain or sob  ADDITIONAL REVIEW OF SYSTEMS: afebrile     MEDICATIONS  (STANDING):  atorvastatin 80 milliGRAM(s) Oral at bedtime  enoxaparin Injectable 40 milliGRAM(s) SubCutaneous every 24 hours  gabapentin 300 milliGRAM(s) Oral daily  multivitamin 1 Tablet(s) Oral daily  pancrelipase  (CREON 24,000 Lipase Units) 1 Capsule(s) Oral three times a day with meals  pantoprazole    Tablet 40 milliGRAM(s) Oral before breakfast  sertraline 25 milliGRAM(s) Oral daily  tamsulosin 0.4 milliGRAM(s) Oral at bedtime  thiamine 100 milliGRAM(s) Oral daily    MEDICATIONS  (PRN):  acetaminophen     Tablet .. 650 milliGRAM(s) Oral every 6 hours PRN Mild Pain (1 - 3), Moderate Pain (4 - 6)      CAPILLARY BLOOD GLUCOSE        I&O's Summary      PHYSICAL EXAM:  Vital Signs Last 24 Hrs  T(C): 36.4 (17 Dec 2022 21:29), Max: 36.6 (17 Dec 2022 11:42)  T(F): 97.5 (17 Dec 2022 21:29), Max: 97.8 (17 Dec 2022 11:42)  HR: 75 (18 Dec 2022 06:30) (67 - 75)  BP: 103/58 (18 Dec 2022 06:30) (103/58 - 115/76)  BP(mean): --  RR: 18 (18 Dec 2022 06:30) (17 - 18)  SpO2: 97% (18 Dec 2022 06:30) (95% - 97%)    Parameters below as of 18 Dec 2022 06:30  Patient On (Oxygen Delivery Method): room air        General: NAD, awake and alert, thin frail man  Eyes: PERRLA, conjunctiva clear, nonicteric sclera  HENMT: MMM  Respiratory: No respiratory distress, CTABL, No rales, rhonchi, wheezing.  Cardiovascular: S1,S2; Regular rate and rhythm; No m/g/r. 2+ peripheral pulses  Gastrointestinal: Soft, Nontender, Nondistended; +BS.   Extremities: No c/c/e; warm to touch  Neurological: Strength: CN Ii-XII intact. 5/5 bilateral handgrip, biceps, quadriceps hamstrings, calf. Sensation to LT grossly in tact in all 4 extremities.  Skin: No rashes, No erythema   Psych: AAOx2, pleasant, in good spirits    LABS:                        12.2   6.29  )-----------( 217      ( 18 Dec 2022 05:55 )             37.7     12-18    140  |  106  |  16  ----------------------------<  90  3.8   |  25  |  0.90    Ca    8.5      18 Dec 2022 05:55  Phos  2.9     12-18  Mg     2.20     12-18                  RADIOLOGY & ADDITIONAL TESTS:  Results Reviewed:   Imaging Personally Reviewed:  Electrocardiogram Personally Reviewed:    COORDINATION OF CARE:  Care Discussed with Consultants/Other Providers [Y/N]:  Prior or Outpatient Records Reviewed [Y/N]:

## 2022-12-18 NOTE — PROGRESS NOTE ADULT - PROBLEM SELECTOR PLAN 4
- [id number] score = 5  - not on AC PTA (due to repeated falls, including w/ head trauma)  - holding home BB - [id number] score = 5  - not on AC PTA (due to repeated falls, including w/ head trauma)  - holding home BB - [id number] score = 5  - not on AC PTA (due to repeated falls, including w/ head trauma)  - holding home BB

## 2022-12-18 NOTE — PROGRESS NOTE ADULT - PROBLEM SELECTOR PLAN 1
- presented with reports of L-sided weakness and is s/p Stroke Code in the ED  - patient reports dizziness, including at rest, frequent falls - associated with head trauma as well  - passed dysphagia screen, regular diet  - continue neuro checks, monitor tele   - FOUND TO HAVE ACUTE INFARCT on MRI  MRI c-spine (12/15) multilevel djd of c-spine and MRI brain favor the presence of hemorrhagic infarct in the right frontal parasagittal region with some scattered areas of diffusion hyperintensity also in the right high NAIMA territory more peripherally.  Left frontal dural based soft tissue lesion without significant mass effect or edema consistent with a meningioma is appreciated.  - neurosurgery/neuro recs appreciated, MRI brain +/- contrast ordered but can be done as outpt, no need to hold him for MRI if rehab bed is available  - ASA dc'd per neuro, c/w statin  -Echo w/ bubble study (12/12) normal LV/RV function, LVEF 67%, 1+MR, no PFO  - PT recs [facility]   -Updated pt's daughter on 12/16, she's agreeable with rehab  - DISPO: DC to rehab, F/u SW. PMR consult. Pt with dementia/cva and frequent falls at home, now hemorrhagic stroke, NOT safe for DC home with home PT.

## 2022-12-19 LAB
ANION GAP SERPL CALC-SCNC: 10 MMOL/L — SIGNIFICANT CHANGE UP (ref 7–14)
BUN SERPL-MCNC: 19 MG/DL — SIGNIFICANT CHANGE UP (ref 7–23)
CALCIUM SERPL-MCNC: 8.6 MG/DL — SIGNIFICANT CHANGE UP (ref 8.4–10.5)
CHLORIDE SERPL-SCNC: 103 MMOL/L — SIGNIFICANT CHANGE UP (ref 98–107)
CO2 SERPL-SCNC: 25 MMOL/L — SIGNIFICANT CHANGE UP (ref 22–31)
CREAT SERPL-MCNC: 0.99 MG/DL — SIGNIFICANT CHANGE UP (ref 0.5–1.3)
EGFR: 74 ML/MIN/1.73M2 — SIGNIFICANT CHANGE UP
GLUCOSE SERPL-MCNC: 86 MG/DL — SIGNIFICANT CHANGE UP (ref 70–99)
HCT VFR BLD CALC: 38.9 % — LOW (ref 39–50)
HGB BLD-MCNC: 12.6 G/DL — LOW (ref 13–17)
MAGNESIUM SERPL-MCNC: 2.2 MG/DL — SIGNIFICANT CHANGE UP (ref 1.6–2.6)
MCHC RBC-ENTMCNC: 32.4 GM/DL — SIGNIFICANT CHANGE UP (ref 32–36)
MCHC RBC-ENTMCNC: 32.7 PG — SIGNIFICANT CHANGE UP (ref 27–34)
MCV RBC AUTO: 101 FL — HIGH (ref 80–100)
NRBC # BLD: 0 /100 WBCS — SIGNIFICANT CHANGE UP (ref 0–0)
NRBC # FLD: 0 K/UL — SIGNIFICANT CHANGE UP (ref 0–0)
PHOSPHATE SERPL-MCNC: 2.9 MG/DL — SIGNIFICANT CHANGE UP (ref 2.5–4.5)
PLATELET # BLD AUTO: 249 K/UL — SIGNIFICANT CHANGE UP (ref 150–400)
POTASSIUM SERPL-MCNC: 3.7 MMOL/L — SIGNIFICANT CHANGE UP (ref 3.5–5.3)
POTASSIUM SERPL-SCNC: 3.7 MMOL/L — SIGNIFICANT CHANGE UP (ref 3.5–5.3)
RBC # BLD: 3.85 M/UL — LOW (ref 4.2–5.8)
RBC # FLD: 13.7 % — SIGNIFICANT CHANGE UP (ref 10.3–14.5)
SARS-COV-2 RNA SPEC QL NAA+PROBE: SIGNIFICANT CHANGE UP
SODIUM SERPL-SCNC: 138 MMOL/L — SIGNIFICANT CHANGE UP (ref 135–145)
WBC # BLD: 6.13 K/UL — SIGNIFICANT CHANGE UP (ref 3.8–10.5)
WBC # FLD AUTO: 6.13 K/UL — SIGNIFICANT CHANGE UP (ref 3.8–10.5)

## 2022-12-19 PROCEDURE — 99232 SBSQ HOSP IP/OBS MODERATE 35: CPT

## 2022-12-19 PROCEDURE — 99222 1ST HOSP IP/OBS MODERATE 55: CPT

## 2022-12-19 RX ORDER — SOD,AMMONIUM,POTASSIUM LACTATE
1 CREAM (GRAM) TOPICAL
Refills: 0 | Status: DISCONTINUED | OUTPATIENT
Start: 2022-12-19 | End: 2022-12-20

## 2022-12-19 RX ADMIN — Medication 1 CAPSULE(S): at 13:56

## 2022-12-19 RX ADMIN — Medication 100 MILLIGRAM(S): at 13:57

## 2022-12-19 RX ADMIN — Medication 1 CAPSULE(S): at 07:53

## 2022-12-19 RX ADMIN — ATORVASTATIN CALCIUM 80 MILLIGRAM(S): 80 TABLET, FILM COATED ORAL at 21:52

## 2022-12-19 RX ADMIN — Medication 1 CAPSULE(S): at 16:49

## 2022-12-19 RX ADMIN — SERTRALINE 25 MILLIGRAM(S): 25 TABLET, FILM COATED ORAL at 13:56

## 2022-12-19 RX ADMIN — Medication 1 TABLET(S): at 13:57

## 2022-12-19 RX ADMIN — GABAPENTIN 300 MILLIGRAM(S): 400 CAPSULE ORAL at 13:56

## 2022-12-19 RX ADMIN — ENOXAPARIN SODIUM 40 MILLIGRAM(S): 100 INJECTION SUBCUTANEOUS at 23:48

## 2022-12-19 RX ADMIN — TAMSULOSIN HYDROCHLORIDE 0.4 MILLIGRAM(S): 0.4 CAPSULE ORAL at 21:53

## 2022-12-19 RX ADMIN — PANTOPRAZOLE SODIUM 40 MILLIGRAM(S): 20 TABLET, DELAYED RELEASE ORAL at 05:15

## 2022-12-19 NOTE — PROGRESS NOTE ADULT - PROBLEM SELECTOR PLAN 7
[facility]: [facility]  Dispo: PT-> [facility]: [facility]  Dispo: PT-> Home PT, family prefers LTC at this time
[facility]: [facility]  Dispo: PT-> [facility]

## 2022-12-19 NOTE — PROGRESS NOTE ADULT - PROBLEM SELECTOR PLAN 1
- presented with reports of L-sided weakness and is s/p Stroke Code in the ED  - patient reports dizziness, including at rest, frequent falls - associated with head trauma as well  - passed dysphagia screen, regular diet  - continue neuro checks, monitor tele   - FOUND TO HAVE ACUTE INFARCT on MRI  MRI c-spine (12/15) multilevel djd of c-spine and MRI brain favor the presence of hemorrhagic infarct in the right frontal parasagittal region with some scattered areas of diffusion hyperintensity also in the right high NAIMA territory more peripherally.  Left frontal dural based soft tissue lesion without significant mass effect or edema consistent with a meningioma is appreciated.  - neurosurgery/neuro recs appreciated, MRI brain +/- contrast ordered but can be done as outpt, no need to hold him for MRI if rehab bed is available  - ASA dc'd per neuro, c/w statin  -Echo w/ bubble study (12/12) normal LV/RV function, LVEF 67%, 1+MR, no PFO  - PT recs [facility]   - Patient's family prefers rehab/long term care facility  - DISPO: DC to LTC, F/u SW. Pt with dementia/cva and frequent falls at home, now hemorrhagic stroke. Family with concerns with bringing patient home due to his frequent falls and dementia.

## 2022-12-19 NOTE — PROGRESS NOTE ADULT - SUBJECTIVE AND OBJECTIVE BOX
[facility] of Hospital Medicine  Guerda Braun MD  Available on MS Teams  Pager: [phone]    Patient is a 87y old  Male who presents with a chief complaint of Generalized weakness (19 Dec 2022 09:37)    OVERNIGHT EVENTS: No acute events overnight.    SUBJECTIVE: Pt seen and examined at bedside this morning. Patient's daughter also present at bedside. Patient with concerns regarding some callouses that he has on the lateral plantar aspects of both his feet. Reports that he has been having discomfort in both feet when ambulating. Otherwise denies any acute complaints. Denies any fevers, chills, nausea, vomiting, CP or SOB.    ADDITIONAL REVIEW OF SYSTEMS: as above.    MEDICATIONS  (STANDING):  atorvastatin 80 milliGRAM(s) Oral at bedtime  enoxaparin Injectable 40 milliGRAM(s) SubCutaneous every 24 hours  gabapentin 300 milliGRAM(s) Oral daily  multivitamin 1 Tablet(s) Oral daily  pancrelipase  (CREON 24,000 Lipase Units) 1 Capsule(s) Oral three times a day with meals  pantoprazole    Tablet 40 milliGRAM(s) Oral before breakfast  sertraline 25 milliGRAM(s) Oral daily  tamsulosin 0.4 milliGRAM(s) Oral at bedtime  thiamine 100 milliGRAM(s) Oral daily    MEDICATIONS  (PRN):  acetaminophen     Tablet .. 650 milliGRAM(s) Oral every 6 hours PRN Mild Pain (1 - 3), Moderate Pain (4 - 6)    CAPILLARY BLOOD GLUCOSE    I&O's Summary    PHYSICAL EXAM:  Vital Signs Last 24 Hrs  T(C): 36.9 (19 Dec 2022 11:56), Max: 36.9 (19 Dec 2022 11:56)  T(F): 98.4 (19 Dec 2022 11:56), Max: 98.4 (19 Dec 2022 11:56)  HR: 74 (19 Dec 2022 11:56) (65 - 81)  BP: 105/62 (19 Dec 2022 11:56) (100/55 - 110/65)  BP(mean): --  RR: 18 (19 Dec 2022 11:56) (18 - 19)  SpO2: 97% (19 Dec 2022 11:56) (95% - 97%)    Parameters below as of 19 Dec 2022 11:56  Patient On (Oxygen Delivery Method): room air    CONSTITUTIONAL: NAD, well-developed  HEAD: Normocephalic, atraumatic  EYES: EOMI, PERRL  ENT: no rhinorrhea, no pharyngeal erythema  RESPIRATORY: No increased work of breathing, CTAB, no wheezes or crackles appreciated  CARDIOVASCULAR: RRR, S1 and S2 present, no m/r/g  ABDOMEN: soft, NT, ND, bowel sounds present  EXTREMITIES: No LE edema, small callous noted on lateral aspect of planter surface on both feet. No bleeding or erythema, callouses appearing dry and hard.  MUSCULOSKELETAL: no joint swelling, no tenderness to palpation  NEURO: A&Ox2, moving all extremities    LABS:                        12.6   6.13  )-----------( 249      ( 19 Dec 2022 06:01 )             38.9     12-19    138  |  103  |  19  ----------------------------<  86  3.7   |  25  |  0.99    Ca    8.6      19 Dec 2022 06:01  Phos  2.9     12-19  Mg     2.20     12-19    RADIOLOGY & ADDITIONAL TESTS:    Results Reviewed:   Imaging Personally Reviewed:  Electrocardiogram Personally Reviewed:    COORDINATION OF CARE:  Care Discussed with Consultants/Other Providers [Y/N]:  Prior or Outpatient Records Reviewed [Y/N]:   [facility]  Guerda Braun MD  Available on MS Teams  Pager: [phone]    Patient is a 87y old  Male who presents with a chief complaint of Generalized weakness (19 Dec 2022 09:37)    OVERNIGHT EVENTS: No acute events overnight.    SUBJECTIVE: Pt seen and examined at bedside this morning. Patient's daughter also present at bedside. Patient with concerns regarding some callouses that he has on the lateral plantar aspects of both his feet. Reports that he has been having discomfort in both feet when ambulating. Otherwise denies any acute complaints. Denies any fevers, chills, nausea, vomiting, CP or SOB.    ADDITIONAL REVIEW OF SYSTEMS: as above.    MEDICATIONS  (STANDING):  atorvastatin 80 milliGRAM(s) Oral at bedtime  enoxaparin Injectable 40 milliGRAM(s) SubCutaneous every 24 hours  gabapentin 300 milliGRAM(s) Oral daily  multivitamin 1 Tablet(s) Oral daily  pancrelipase  (CREON 24,000 Lipase Units) 1 Capsule(s) Oral three times a day with meals  pantoprazole    Tablet 40 milliGRAM(s) Oral before breakfast  sertraline 25 milliGRAM(s) Oral daily  tamsulosin 0.4 milliGRAM(s) Oral at bedtime  thiamine 100 milliGRAM(s) Oral daily    MEDICATIONS  (PRN):  acetaminophen     Tablet .. 650 milliGRAM(s) Oral every 6 hours PRN Mild Pain (1 - 3), Moderate Pain (4 - 6)    CAPILLARY BLOOD GLUCOSE    I&O's Summary    PHYSICAL EXAM:  Vital Signs Last 24 Hrs  T(C): 36.9 (19 Dec 2022 11:56), Max: 36.9 (19 Dec 2022 11:56)  T(F): 98.4 (19 Dec 2022 11:56), Max: 98.4 (19 Dec 2022 11:56)  HR: 74 (19 Dec 2022 11:56) (65 - 81)  BP: 105/62 (19 Dec 2022 11:56) (100/55 - 110/65)  BP(mean): --  RR: 18 (19 Dec 2022 11:56) (18 - 19)  SpO2: 97% (19 Dec 2022 11:56) (95% - 97%)    Parameters below as of 19 Dec 2022 11:56  Patient On (Oxygen Delivery Method): room air    CONSTITUTIONAL: NAD, well-developed  HEAD: Normocephalic, atraumatic  EYES: EOMI, PERRL  ENT: no rhinorrhea, no pharyngeal erythema  RESPIRATORY: No increased work of breathing, CTAB, no wheezes or crackles appreciated  CARDIOVASCULAR: RRR, S1 and S2 present, no m/r/g  ABDOMEN: soft, NT, ND, bowel sounds present  EXTREMITIES: No LE edema, small callous noted on lateral aspect of planter surface on both feet. No bleeding or erythema, callouses appearing dry and hard.  MUSCULOSKELETAL: no joint swelling, no tenderness to palpation  NEURO: A&Ox2, moving all extremities    LABS:                        12.6   6.13  )-----------( 249      ( 19 Dec 2022 06:01 )             38.9     12-19    138  |  103  |  19  ----------------------------<  86  3.7   |  25  |  0.99    Ca    8.6      19 Dec 2022 06:01  Phos  2.9     12-19  Mg     2.20     12-19    RADIOLOGY & ADDITIONAL TESTS:    Results Reviewed:   Imaging Personally Reviewed:  Electrocardiogram Personally Reviewed:    COORDINATION OF CARE:  Care Discussed with Consultants/Other Providers [Y/N]:  Prior or Outpatient Records Reviewed [Y/N]:   [facility]  Guerda Braun MD  Available on MS Teams  Pager: [phone]    Patient is a 87y old  Male who presents with a chief complaint of Generalized weakness (19 Dec 2022 09:37)    OVERNIGHT EVENTS: No acute events overnight.    SUBJECTIVE: Pt seen and examined at bedside this morning. Patient's daughter also present at bedside. Patient with concerns regarding some callouses that he has on the lateral plantar aspects of both his feet. Reports that he has been having discomfort in both feet when ambulating. Otherwise denies any acute complaints. Denies any fevers, chills, nausea, vomiting, CP or SOB.    ADDITIONAL REVIEW OF SYSTEMS: as above.    MEDICATIONS  (STANDING):  atorvastatin 80 milliGRAM(s) Oral at bedtime  enoxaparin Injectable 40 milliGRAM(s) SubCutaneous every 24 hours  gabapentin 300 milliGRAM(s) Oral daily  multivitamin 1 Tablet(s) Oral daily  pancrelipase  (CREON 24,000 Lipase Units) 1 Capsule(s) Oral three times a day with meals  pantoprazole    Tablet 40 milliGRAM(s) Oral before breakfast  sertraline 25 milliGRAM(s) Oral daily  tamsulosin 0.4 milliGRAM(s) Oral at bedtime  thiamine 100 milliGRAM(s) Oral daily    MEDICATIONS  (PRN):  acetaminophen     Tablet .. 650 milliGRAM(s) Oral every 6 hours PRN Mild Pain (1 - 3), Moderate Pain (4 - 6)    CAPILLARY BLOOD GLUCOSE    I&O's Summary    PHYSICAL EXAM:  Vital Signs Last 24 Hrs  T(C): 36.9 (19 Dec 2022 11:56), Max: 36.9 (19 Dec 2022 11:56)  T(F): 98.4 (19 Dec 2022 11:56), Max: 98.4 (19 Dec 2022 11:56)  HR: 74 (19 Dec 2022 11:56) (65 - 81)  BP: 105/62 (19 Dec 2022 11:56) (100/55 - 110/65)  BP(mean): --  RR: 18 (19 Dec 2022 11:56) (18 - 19)  SpO2: 97% (19 Dec 2022 11:56) (95% - 97%)    Parameters below as of 19 Dec 2022 11:56  Patient On (Oxygen Delivery Method): room air    CONSTITUTIONAL: NAD, well-developed  HEAD: Normocephalic, atraumatic  EYES: EOMI, PERRL  ENT: no rhinorrhea, no pharyngeal erythema  RESPIRATORY: No increased work of breathing, CTAB, no wheezes or crackles appreciated  CARDIOVASCULAR: RRR, S1 and S2 present, no m/r/g  ABDOMEN: soft, NT, ND, bowel sounds present  EXTREMITIES: No LE edema, small callous noted on lateral aspect of planter surface on both feet. No bleeding or erythema, callouses appearing dry and hard.  MUSCULOSKELETAL: no joint swelling, no tenderness to palpation  NEURO: A&Ox2, moving all extremities    LABS:                        12.6   6.13  )-----------( 249      ( 19 Dec 2022 06:01 )             38.9     12-19    138  |  103  |  19  ----------------------------<  86  3.7   |  25  |  0.99    Ca    8.6      19 Dec 2022 06:01  Phos  2.9     12-19  Mg     2.20     12-19    RADIOLOGY & ADDITIONAL TESTS:    Results Reviewed:   Imaging Personally Reviewed:  Electrocardiogram Personally Reviewed:    COORDINATION OF CARE:  Care Discussed with Consultants/Other Providers [Y/N]:  Prior or Outpatient Records Reviewed [Y/N]:

## 2022-12-19 NOTE — PROGRESS NOTE ADULT - PROBLEM SELECTOR PROBLEM 1
Suspected cerebrovascular accident (CVA)

## 2022-12-19 NOTE — PROGRESS NOTE ADULT - PROBLEM SELECTOR PLAN 5
- CT demonstrates "Age indeterminate likely subacute to chronic T12 anterior compression deformity without significant osseous retropulsion or definite surrounding soft tissue edema...."  - no reports of pain presently; patient has very high pain threshold, per daughter, so may not acknowledge the presence of pain
bilateral breast implants/yes(specify)
- CT demonstrates "Age indeterminate likely subacute to chronic T12 anterior compression deformity without significant osseous retropulsion or definite surrounding soft tissue edema...."  - no reports of pain presently; patient has very high pain threshold, per daughter, so may not acknowledge the presence of pain

## 2022-12-19 NOTE — PROGRESS NOTE ADULT - PROBLEM SELECTOR PLAN 2
- heart rate in 50s, but mostly when sleeping  - on metoprolol 25 mg at home  - initial TSH mildly elevated at 6.79, repeat TSH and FT4 normal    - continue to hold BB  - tele monitor   - echo with normal LV function as above
- heart rate in 50s, but mostly when sleeping  - on metoprolol 25 mg at home  - initial TSH mildly elevated at 6.79, repeat TSH and FT4 normal    - continue to hold BB  - continues on Telemetry  - f/u TTE w/ Bubble Study
- heart rate in 50s, but mostly when sleeping  - on metoprolol 25 mg at home  [ ]TSH mildly elevated at 6.79, repeat TSH with FT4 ordered   - continue to hold BB  - continues on Telemetry  - f/u TTE w/ Bubble Study (ordered)
- heart rate in 50s, but mostly when sleeping  - on metoprolol 25 mg at home  - initial TSH mildly elevated at 6.79, repeat TSH and FT4 normal    - continue to hold BB  - continues on Telemetry  - f/u TTE w/ Bubble Study (ordered)
- heart rate in 50s, but mostly when sleeping  - on metoprolol 25 mg at home  - initial TSH mildly elevated at 6.79, repeat TSH and FT4 normal    - continue to hold BB  - tele monitor   - echo with normal LV function as above
- heart rate in 50s, but mostly when sleeping  - on metoprolol 25 mg at home  - initial TSH mildly elevated at 6.79, repeat TSH and FT4 normal    - continue to hold BB  - tele monitor   - echo with normal LV function as above

## 2022-12-19 NOTE — PROGRESS NOTE ADULT - REASON FOR ADMISSION
Generalized weakness

## 2022-12-19 NOTE — PROGRESS NOTE ADULT - PROBLEM SELECTOR PLAN 4
- [id number] score = 5  - not on AC PTA (due to repeated falls, including w/ head trauma)  - holding home BB - [id number] score = 5  - not on AC PTA (due to repeated falls, including w/ head trauma)  - holding home BB - [id number] score = 5  - not on AC PTA (due to repeated falls, including w/ head trauma)  - holding home BB

## 2022-12-19 NOTE — PROGRESS NOTE ADULT - ASSESSMENT
87 year old male, with past history significant for HTN, CVA, Meningioma and A-fib, presented to the ED secondary to generalized weakness, and frequent falls. Code Stroke was called for left sided weakness. CT head, CTA H&N, showed patient's known meningioma with no other acute intracranial abnormality and no LVO. Patient currently pending placement to long term care facility.

## 2022-12-19 NOTE — CONSULT NOTE ADULT - SUBJECTIVE AND OBJECTIVE BOX
Patient is a 87y old  Male who presents with a chief complaint of Generalized weakness (18 Dec 2022 10:34)      HPI:  87 year old male, with past history significant for HTN, CVA, Meningioma and A-fib, presented to the ED secondary to generalized weakness, and frequent falls.       REVIEW OF SYSTEMS     PAST MEDICAL & SURGICAL HISTORY  HTN (hypertension)    Atrial fibrillation    Cerebrovascular accident (CVA)    Meningioma    Gastric cancer    Cancer, colon    Osteoporosis    Shingles    Back pain    Stomach cancer    No significant past surgical history    H/O abdominal surgery    Other chemotherapy        SOCIAL HISTORY  Smoking - Denied  EtOH - Denied   Drugs - Denied    FUNCTIONAL HISTORY  Lives   Independent    CURRENT FUNCTIONAL STATUS  12/15     Bed Mobility  Bed Mobility Training Treatment not Performed: not assessed, received standing and left seated on EOB.    Sit-Stand Transfer Training  Sit-to-Stand Transfer Training Rehab Potential: good, to achieve stated therapy goals  Sit-to-Stand Transfer Training Symptoms Noted During/After Treatment: none  Transfer Training Sit-to-Stand Transfer: supervsion;  full weight-bearing  Transfer Training Stand-to-Sit Transfer: supervsion;  full weight-bearing    Gait Training  Gait Training Rehab Potential: good, to achieve stated therapy goals  Gait Training Symptoms Noted During/After Treatment: none  Gait Training: supervsion;  verbal cues;  full weight-bearing   no assistive device;  75 feet  Gait Analysis: swing-through gait   decreased rayshawn;  decreased strength;  impulsive at times; verbal cues to slow down ;  75 feet    Stair Training  Physical Assist/Nonphysical Assist: supervision  Weight-Bearing Restrictions: full weight-bearing  Assistive Device: right rail up;  right rail down  Number of Stairs: 8           FAMILY HISTORY   Family history non-contributory        RECENT LABS/IMAGING  CBC Full  -  ( 19 Dec 2022 06:01 )  WBC Count : 6.13 K/uL  RBC Count : 3.85 M/uL  Hemoglobin : 12.6 g/dL  Hematocrit : 38.9 %  Platelet Count - Automated : 249 K/uL  Mean Cell Volume : 101.0 fL  Mean Cell Hemoglobin : 32.7 pg  Mean Cell Hemoglobin Concentration : 32.4 gm/dL  Auto Neutrophil # : x  Auto Lymphocyte # : x  Auto Monocyte # : x  Auto Eosinophil # : x  Auto Basophil # : x  Auto Neutrophil % : x  Auto Lymphocyte % : x  Auto Monocyte % : x  Auto Eosinophil % : x  Auto Basophil % : x    12-19    138  |  103  |  19  ----------------------------<  86  3.7   |  25  |  0.99    Ca    8.6      19 Dec 2022 06:01  Phos  2.9     12-19  Mg     2.20     12-19          VITALS  T(C): 36.8 (12-19-22 @ 05:10), Max: 36.8 (12-18-22 @ 21:00)  HR: 81 (12-19-22 @ 05:10) (64 - 81)  BP: 100/55 (12-19-22 @ 05:10) (100/55 - 110/65)  RR: 19 (12-19-22 @ 05:10) (18 - 19)  SpO2: 95% (12-19-22 @ 05:10) (95% - 95%)  Wt(kg): --    ALLERGIES  No Known Allergies      MEDICATIONS   acetaminophen     Tablet .. 650 milliGRAM(s) Oral every 6 hours PRN  atorvastatin 80 milliGRAM(s) Oral at bedtime  enoxaparin Injectable 40 milliGRAM(s) SubCutaneous every 24 hours  gabapentin 300 milliGRAM(s) Oral daily  multivitamin 1 Tablet(s) Oral daily  pancrelipase  (CREON 24,000 Lipase Units) 1 Capsule(s) Oral three times a day with meals  pantoprazole    Tablet 40 milliGRAM(s) Oral before breakfast  sertraline 25 milliGRAM(s) Oral daily  tamsulosin 0.4 milliGRAM(s) Oral at bedtime  thiamine 100 milliGRAM(s) Oral daily      ----------------------------------------------------------------------------------------  PHYSICAL EXAM  Constitutional - NAD, Comfortable  HEENT - NCAT, EOMI  Neck - Supple, No limited ROM  Chest - CTA bilaterally, No wheeze, No rhonchi, No crackles  Cardiovascular - RRR, S1S2, No murmurs  Abdomen - BS+, Soft, NTND  Extremities - No C/C/E, No calf tenderness   Neurologic Exam -                    Cognitive - Awake, Alert, AAO to self, place, date, year, situation     Communication - Fluent, No dysarthria     Cranial Nerves - CN 2-12 intact     Motor - No focal deficits                    LEFT    UE - ShAB 5/5, EF 5/5, EE 5/5, WE 5/5,  5/5                    RIGHT UE - ShAB 5/5, EF 5/5, EE 5/5, WE 5/5,  5/5                    LEFT    LE - HF 5/5, KE 5/5, DF 5/5, PF 5/5                    RIGHT LE - HF 5/5, KE 5/5, DF 5/5, PF 5/5        Sensory - Intact to LT     Reflexes - DTR Intact, No primitive reflexive     Coordination - FTN intact     OculoVestibular - No saccades, No nystagmus, VOR         Balance - WNL Static  Psychiatric - Mood stable, Affect WNL  ----------------------------------------------------------------------------------------  ASSESSMENT/PLAN      Pain -  DVT PPX -   Rehab -      incomplete note, consult in progress Patient is a 87y old  Male who presents with a chief complaint of Generalized weakness (18 Dec 2022 10:34)      HPI:  87 year old male, with past history significant for HTN, CVA, Meningioma and A-fib, presented to the ED secondary to generalized weakness, and frequent falls.     daughter at bedside reports patient lives with his wife but has been more forgetful, history of depression, and difficulty with hearing. She is concerned his wife is not able to care for him as dementia worsens.       REVIEW OF SYSTEMS   confusion at times    PAST MEDICAL & SURGICAL HISTORY  HTN (hypertension)    Atrial fibrillation    Cerebrovascular accident (CVA)    Meningioma    Gastric cancer    Cancer, colon    Osteoporosis    Shingles    Back pain    Stomach cancer    No significant past surgical history    H/O abdominal surgery    Other chemotherapy        SOCIAL HISTORY  Smoking - former use  EtOH - former use  Drugs - Denied    FUNCTIONAL HISTORY  Lives with wife in 4th floor, with elevator  Independent at baseline    CURRENT FUNCTIONAL STATUS  12/15     Bed Mobility  Bed Mobility Training Treatment not Performed: not assessed, received standing and left seated on EOB.    Sit-Stand Transfer Training  Sit-to-Stand Transfer Training Rehab Potential: good, to achieve stated therapy goals  Sit-to-Stand Transfer Training Symptoms Noted During/After Treatment: none  Transfer Training Sit-to-Stand Transfer: supervsion;  full weight-bearing  Transfer Training Stand-to-Sit Transfer: supervsion;  full weight-bearing    Gait Training  Gait Training Rehab Potential: good, to achieve stated therapy goals  Gait Training Symptoms Noted During/After Treatment: none  Gait Training: supervsion;  verbal cues;  full weight-bearing   no assistive device;  75 feet  Gait Analysis: swing-through gait   decreased rayshawn;  decreased strength;  impulsive at times; verbal cues to slow down ;  75 feet    Stair Training  Physical Assist/Nonphysical Assist: supervision  Weight-Bearing Restrictions: full weight-bearing  Assistive Device: right rail up;  right rail down  Number of Stairs: 8           FAMILY HISTORY   Family history non-contributory        RECENT LABS/IMAGING  CBC Full  -  ( 19 Dec 2022 06:01 )  WBC Count : 6.13 K/uL  RBC Count : 3.85 M/uL  Hemoglobin : 12.6 g/dL  Hematocrit : 38.9 %  Platelet Count - Automated : 249 K/uL  Mean Cell Volume : 101.0 fL  Mean Cell Hemoglobin : 32.7 pg  Mean Cell Hemoglobin Concentration : 32.4 gm/dL  Auto Neutrophil # : x  Auto Lymphocyte # : x  Auto Monocyte # : x  Auto Eosinophil # : x  Auto Basophil # : x  Auto Neutrophil % : x  Auto Lymphocyte % : x  Auto Monocyte % : x  Auto Eosinophil % : x  Auto Basophil % : x    12-19    138  |  103  |  19  ----------------------------<  86  3.7   |  25  |  0.99    Ca    8.6      19 Dec 2022 06:01  Phos  2.9     12-19  Mg     2.20     12-19          VITALS  T(C): 36.8 (12-19-22 @ 05:10), Max: 36.8 (12-18-22 @ 21:00)  HR: 81 (12-19-22 @ 05:10) (64 - 81)  BP: 100/55 (12-19-22 @ 05:10) (100/55 - 110/65)  RR: 19 (12-19-22 @ 05:10) (18 - 19)  SpO2: 95% (12-19-22 @ 05:10) (95% - 95%)  Wt(kg): --    ALLERGIES  No Known Allergies      MEDICATIONS   acetaminophen     Tablet .. 650 milliGRAM(s) Oral every 6 hours PRN  atorvastatin 80 milliGRAM(s) Oral at bedtime  enoxaparin Injectable 40 milliGRAM(s) SubCutaneous every 24 hours  gabapentin 300 milliGRAM(s) Oral daily  multivitamin 1 Tablet(s) Oral daily  pancrelipase  (CREON 24,000 Lipase Units) 1 Capsule(s) Oral three times a day with meals  pantoprazole    Tablet 40 milliGRAM(s) Oral before breakfast  sertraline 25 milliGRAM(s) Oral daily  tamsulosin 0.4 milliGRAM(s) Oral at bedtime  thiamine 100 milliGRAM(s) Oral daily      ----------------------------------------------------------------------------------------  PHYSICAL EXAM  Constitutional - NAD, Comfortable  HEENT - NCAT, EOMI  Neck - Supple, No limited ROM  Chest - no respiratory distress  Cardiovascular - RRR, S1S2   Abdomen -  Soft, NTND  Extremities - No C/C/E, No calf tenderness + b/l foot pain (reports history of corns)  Neurologic Exam -                    Cognitive - Awake, Alert, AAO to self, place, date, year, situation     Communication - Fluent, No dysarthria     Cranial Nerves - CN 2-12 intact     Motor - No focal deficits                    LEFT    UE - ShAB 5/5, EF 5/5, EE 5/5, WE 5/5,  5/5                    RIGHT UE - ShAB 5/5, EF 5/5, EE 5/5, WE 5/5,  5/5                    LEFT    LE - HF 5/5, KE 5/5, DF 5/5, PF 5/5                    RIGHT LE - HF 5/5, KE 5/5, DF 5/5, PF 5/5        Sensory - Intact to LT      Balance - WNL Static  Psychiatric - Mood stable, Affect WNL  ----------------------------------------------------------------------------------------  ASSESSMENT/PLAN   87 year old male, with past history significant for HTN, CVA, Meningioma and A-fib, presented to the ED secondary to generalized weakness, and frequent falls.  found to have hemorrhagic infarct  Pain -gabapentin  DVT PPX -  lovenox  diet: regular  mri head w/wo iv  Rehab -  patient ambulating and climbed stairs with supervision.  Recommend home with 24 hour supervision or long term care per discussion with family

## 2022-12-19 NOTE — PROGRESS NOTE ADULT - PROBLEM SELECTOR PROBLEM 3
Hypophosphatemia

## 2022-12-19 NOTE — PROGRESS NOTE ADULT - PROBLEM SELECTOR PROBLEM 5
Thoracic compression fracture

## 2022-12-19 NOTE — PROGRESS NOTE ADULT - PROBLEM SELECTOR PROBLEM 2
Sinus bradycardia

## 2022-12-19 NOTE — PROGRESS NOTE ADULT - NUTRITIONAL ASSESSMENT
This patient has been assessed with a concern for Malnutrition and has been determined to have a diagnosis/diagnoses of Severe protein-calorie malnutrition and Underweight (BMI < 19).    This patient is being managed with:   Diet Regular-  Supplement Feeding Modality:  Oral  Ensure Enlive Cans or Servings Per Day:  1       Frequency:  Two Times a day  Entered: Dec 18 2022 10:38AM    
This patient has been assessed with a concern for Malnutrition and has been determined to have a diagnosis/diagnoses of Severe protein-calorie malnutrition and Underweight (BMI < 19).    This patient is being managed with:   Diet Regular-  Entered: Dec 12 2022  5:39PM    

## 2022-12-19 NOTE — PROGRESS NOTE ADULT - PROVIDER SPECIALTY LIST ADULT
Hospitalist
Neurology
Hospitalist

## 2022-12-20 ENCOUNTER — TRANSCRIPTION ENCOUNTER (OUTPATIENT)
Age: 87
End: 2022-12-20

## 2022-12-20 VITALS
HEART RATE: 65 BPM | TEMPERATURE: 98 F | OXYGEN SATURATION: 97 % | DIASTOLIC BLOOD PRESSURE: 57 MMHG | RESPIRATION RATE: 18 BRPM | SYSTOLIC BLOOD PRESSURE: 102 MMHG

## 2022-12-20 LAB
ANION GAP SERPL CALC-SCNC: 6 MMOL/L — LOW (ref 7–14)
BUN SERPL-MCNC: 17 MG/DL — SIGNIFICANT CHANGE UP (ref 7–23)
CALCIUM SERPL-MCNC: 8.9 MG/DL — SIGNIFICANT CHANGE UP (ref 8.4–10.5)
CHLORIDE SERPL-SCNC: 103 MMOL/L — SIGNIFICANT CHANGE UP (ref 98–107)
CO2 SERPL-SCNC: 27 MMOL/L — SIGNIFICANT CHANGE UP (ref 22–31)
CREAT SERPL-MCNC: 0.86 MG/DL — SIGNIFICANT CHANGE UP (ref 0.5–1.3)
EGFR: 84 ML/MIN/1.73M2 — SIGNIFICANT CHANGE UP
GLUCOSE SERPL-MCNC: 99 MG/DL — SIGNIFICANT CHANGE UP (ref 70–99)
HCT VFR BLD CALC: 37.3 % — LOW (ref 39–50)
HGB BLD-MCNC: 12.3 G/DL — LOW (ref 13–17)
MAGNESIUM SERPL-MCNC: 2.1 MG/DL — SIGNIFICANT CHANGE UP (ref 1.6–2.6)
MCHC RBC-ENTMCNC: 32.9 PG — SIGNIFICANT CHANGE UP (ref 27–34)
MCHC RBC-ENTMCNC: 33 GM/DL — SIGNIFICANT CHANGE UP (ref 32–36)
MCV RBC AUTO: 99.7 FL — SIGNIFICANT CHANGE UP (ref 80–100)
NRBC # BLD: 0 /100 WBCS — SIGNIFICANT CHANGE UP (ref 0–0)
NRBC # FLD: 0 K/UL — SIGNIFICANT CHANGE UP (ref 0–0)
PHOSPHATE SERPL-MCNC: 3.4 MG/DL — SIGNIFICANT CHANGE UP (ref 2.5–4.5)
PLATELET # BLD AUTO: 218 K/UL — SIGNIFICANT CHANGE UP (ref 150–400)
POTASSIUM SERPL-MCNC: 3.6 MMOL/L — SIGNIFICANT CHANGE UP (ref 3.5–5.3)
POTASSIUM SERPL-SCNC: 3.6 MMOL/L — SIGNIFICANT CHANGE UP (ref 3.5–5.3)
RBC # BLD: 3.74 M/UL — LOW (ref 4.2–5.8)
RBC # FLD: 13.5 % — SIGNIFICANT CHANGE UP (ref 10.3–14.5)
SODIUM SERPL-SCNC: 136 MMOL/L — SIGNIFICANT CHANGE UP (ref 135–145)
WBC # BLD: 6.38 K/UL — SIGNIFICANT CHANGE UP (ref 3.8–10.5)
WBC # FLD AUTO: 6.38 K/UL — SIGNIFICANT CHANGE UP (ref 3.8–10.5)

## 2022-12-20 PROCEDURE — 99239 HOSP IP/OBS DSCHRG MGMT >30: CPT

## 2022-12-20 RX ORDER — ATORVASTATIN CALCIUM 80 MG/1
1 TABLET, FILM COATED ORAL
Qty: 30 | Refills: 0
Start: 2022-12-20 | End: 2023-01-18

## 2022-12-20 RX ORDER — THIAMINE MONONITRATE (VIT B1) 100 MG
1 TABLET ORAL
Qty: 0 | Refills: 0 | DISCHARGE
Start: 2022-12-20

## 2022-12-20 RX ADMIN — Medication 1 CAPSULE(S): at 17:53

## 2022-12-20 RX ADMIN — PANTOPRAZOLE SODIUM 40 MILLIGRAM(S): 20 TABLET, DELAYED RELEASE ORAL at 04:49

## 2022-12-20 RX ADMIN — Medication 1 CAPSULE(S): at 12:39

## 2022-12-20 RX ADMIN — Medication 1 TABLET(S): at 12:40

## 2022-12-20 RX ADMIN — Medication 1 APPLICATION(S): at 17:53

## 2022-12-20 RX ADMIN — Medication 1 APPLICATION(S): at 06:14

## 2022-12-20 RX ADMIN — SERTRALINE 25 MILLIGRAM(S): 25 TABLET, FILM COATED ORAL at 12:39

## 2022-12-20 RX ADMIN — Medication 100 MILLIGRAM(S): at 12:40

## 2022-12-20 RX ADMIN — GABAPENTIN 300 MILLIGRAM(S): 400 CAPSULE ORAL at 12:39

## 2022-12-20 RX ADMIN — Medication 1 CAPSULE(S): at 04:49

## 2022-12-20 NOTE — DISCHARGE NOTE PROVIDER - NSDCMRMEDTOKEN_GEN_ALL_CORE_FT
Creon 24,000 units oral delayed release capsule: 1 cap(s) orally 3 times a day  gabapentin 300 mg oral tablet: 1 tab(s) orally once a day  Icosapent Ethyl 1 g oral capsule: 2 cap(s) orally once a day  lidocaine 4% patch: 1 patch transdermal once a day - apply to abdomen  LORazepam 1 mg oral tablet: 1 tab(s) orally once a day (at bedtime), As Needed  Lubricant Eye Drops:   metoprolol succinate 25 mg oral tablet, extended release: 1 tab(s) orally once a day  omeprazole 20 mg oral delayed release capsule: 1 cap(s) orally once a day  Prolia: subcutaneous once a month - last taken 11/21/2022  sertraline 25 mg oral tablet: 1 tab(s) orally once a day  tamsulosin 0.4 mg oral capsule: 1 cap(s) orally once a day  Tums 500 oral tablet, chewable (obsolete): 1 tab(s) orally 2 times a day   Creon 24,000 units oral delayed release capsule: 1 cap(s) orally 3 times a day  gabapentin 300 mg oral tablet: 1 tab(s) orally once a day  lidocaine 4% patch: 1 patch transdermal once a day - apply to abdomen  LORazepam 1 mg oral tablet: 1 tab(s) orally once a day (at bedtime), As Needed  Lubricant Eye Drops:   omeprazole 20 mg oral delayed release capsule: 1 cap(s) orally once a day  sertraline 25 mg oral tablet: 1 tab(s) orally once a day  tamsulosin 0.4 mg oral capsule: 1 cap(s) orally once a day   atorvastatin 80 mg oral tablet: 1 tab(s) orally once a day (at bedtime)   Creon 24,000 units oral delayed release capsule: 1 cap(s) orally 3 times a day  gabapentin 300 mg oral tablet: 1 tab(s) orally once a day  lidocaine 4% patch: 1 patch transdermal once a day - apply to abdomen  LORazepam 1 mg oral tablet: 1 tab(s) orally once a day (at bedtime), As Needed  Lubricant Eye Drops:   Multiple Vitamins oral tablet: 1 tab(s) orally once a day  omeprazole 20 mg oral delayed release capsule: 1 cap(s) orally once a day  sertraline 25 mg oral tablet: 1 tab(s) orally once a day  tamsulosin 0.4 mg oral capsule: 1 cap(s) orally once a day  thiamine 100 mg oral tablet: 1 tab(s) orally once a day

## 2022-12-20 NOTE — DISCHARGE NOTE PROVIDER - NSDCFUADDAPPT_GEN_ALL_CORE_FT
Patient can follow up with general neurology at [address] [place] [address] 1-2 weeks after discharge. Please instruct the patient to call [phone] to schedule this appointment.    Patient can follow up with general neurology at [address] [place] [address] 1-2 weeks after discharge. Please instruct the patient to call [phone] to schedule this appointment.    Patient can follow up with general neurology at [address] [place] [address] 1-2 weeks after discharge. Please instruct the patient to call [phone] to schedule this appointment.    It is recommended that you get an MRI with and without contrast. You can follow up with general neurology at [address] [place] [address] 1-2 weeks after discharge. Please to call [phone] to schedule this appointment.     It is recommended that you get an MRI with and without contrast. You can follow up with general neurology at [address] [place] [address] 1-2 weeks after discharge. Please to call [phone] to schedule this appointment.     It is recommended that you get an MRI with and without contrast. You can follow up with general neurology at [address] [place] [address] 1-2 weeks after discharge. Please to call [phone] to schedule this appointment.

## 2022-12-20 NOTE — DISCHARGE NOTE PROVIDER - ATTENDING DISCHARGE PHYSICAL EXAMINATION:
Patient seen and examined on day of discharge (12/20/22). Patient appearing comfortable. Tolerating diet and ambulating around unit with walker. Patient denies any acute complaints. Overall patient admitted for falls and weakness. MRI showing left sided meningioma. NSG and neurology recs noted, patient to follow up outpatient with serial imaging to assess for stability. Patient otherwise ambulating well and optimized for discharge home with his family.    PHYSICAL EXAM:  GENERAL: NAD, sitting up eating lunch  HEENT: NC/AT, EOMI  NECK: Supple, No JVD  CHEST/LUNG: CTAB, no increased WOB  HEART: RRR, no m/r/g  ABDOMEN: soft, NT, ND, BS+  EXTREMITIES:  2+ peripheral pulses, no clubbing, no edema  NERVOUS SYSTEM:  A&Ox1-2, no focal deficits  MSK: FROM all 4 extremities, full and equal strength  SKIN: No rashes or lesions

## 2022-12-20 NOTE — DISCHARGE NOTE PROVIDER - HOSPITAL COURSE
87 year old male, with past history significant for HTN, CVA, Meningioma and A-fib, presented to the ED secondary to generalized weakness, and frequent falls. Code Stroke was called for left sided weakness. CT head, CTA H&N, showed patient's known meningioma with no other acute intracranial abnormality and no LVO. Patient currently pending placement to long term care facility.    Hospital Course    Suspected cerebrovascular accident (CVA).   Presented with reports of L-sided weakness and is s/p Stroke Code in the ED. Patient reports dizziness, including at rest, frequent falls - associated with head trauma as well. Passed dysphagia screen, regular diet. Found to have acute infarct on MRI. MRI c-spine (12/15) multilevel djd of c-spine and MRI brain favor the presence of hemorrhagic infarct in the right frontal parasagittal region with some scattered areas of diffusion hyperintensity also in the right high NAIMA territory more peripherally.  Left frontal dural based soft tissue lesion without significant mass effect or edema consistent with a meningioma is appreciated. Neurosurgery/neuro recs appreciated, MRI brain +/- contrast ordered but can be done as outpt, no need to hold him for MRI. ASA dc'd per neuro, c/w statin. Echo w/ bubble study (12/12) normal LV/RV function, LVEF 67%, 1+MR, no PFO. PT recs MARIAELENA. Patient's family prefers rehab/long term care facility. As per insurance pt does not have NH coverage and dispo changed to home with home care.     Sinus bradycardia.   Heart rate in 50s, but mostly when sleeping. On metoprolol 25 mg at home. Initial TSH mildly elevated at 6.79, repeat TSH and FT4 normal. Continue to hold BB. Echo with normal LV function as above.    Hypophosphatemia.   Likely contributing toward fatigue/weakness. Supplementation prescribed. Encourage po intake.    Paroxysmal atrial fibrillation.   [id number] score = 5. Not on AC PTA (due to repeated falls, including w/ head trauma), Holding home BB.    Thoracic compression fracture.   CT demonstrates "Age indeterminate likely subacute to chronic T12 anterior compression deformity without significant osseous retropulsion or definite surrounding soft tissue edema...." No reports of pain presently; patient has very high pain threshold, per daughter, so may not acknowledge the presence of pain.    Severe protein-calorie malnutrition.   Gastric cancer s/p resection. Family reports poor PO intake since gastric surgery several years ago.Has been losing weight since then, BMI 18. Started ensure BID, nutrition eval  po thiamine, mvi. B12 996, folate 18.7, tsh 6.79, T4 6.83, A1c 5.7%.    On 12/20/2022 this case was reviewed with Dr. Braun, the patient is medically stable and optimized for discharge. All medications were reviewed and prescriptions were sent to mutually agreed upon pharmacy.        87 year old male, with past history significant for HTN, CVA, Meningioma and A-fib, presented to the ED secondary to generalized weakness, and frequent falls. Code Stroke was called for left sided weakness. CT head, CTA H&N, showed patient's known meningioma with no other acute intracranial abnormality and no LVO. Patient currently pending placement to long term care facility.    Hospital Course    Suspected cerebrovascular accident (CVA).   Presented with reports of L-sided weakness and is s/p Stroke Code in the ED. Patient reports dizziness, including at rest, frequent falls - associated with head trauma as well. Passed dysphagia screen, regular diet. Found to have acute infarct on MRI. MRI c-spine (12/15) multilevel djd of c-spine and MRI brain favor the presence of hemorrhagic infarct in the right frontal parasagittal region with some scattered areas of diffusion hyperintensity also in the right high NAIMA territory more peripherally.  Left frontal dural based soft tissue lesion without significant mass effect or edema consistent with a meningioma is appreciated. Neurosurgery/neuro recs appreciated, MRI brain +/- contrast ordered but can be done as outpt, no need to hold him for MRI. ASA dc'd per neuro, c/w statin. Echo w/ bubble study (12/12) normal LV/RV function, LVEF 67%, 1+MR, no PFO. PT recs MARIAELENA. Patient's family prefers rehab/long term care facility. As per insurance pt does not have NH coverage and dispo changed to home with home care.     Sinus bradycardia.   Heart rate in 50s, but mostly when sleeping. On metoprolol 25 mg at home. Initial TSH mildly elevated at 6.79, repeat TSH and FT4 normal. Continue to hold BB. Echo with normal LV function as above.    Hypophosphatemia.   Likely contributing toward fatigue/weakness. Supplementation prescribed. Encourage po intake.    Paroxysmal atrial fibrillation.   [id number] score = 5. Not on AC PTA (due to repeated falls, including w/ head trauma), Holding home BB.    Thoracic compression fracture.   CT demonstrates "Age indeterminate likely subacute to chronic T12 anterior compression deformity without significant osseous retropulsion or definite surrounding soft tissue edema...." No reports of pain presently; patient has very high pain threshold, per daughter, so may not acknowledge the presence of pain.    Severe protein-calorie malnutrition.   Gastric cancer s/p resection. Family reports poor PO intake since gastric surgery several years ago.Has been losing weight since then, BMI 18. Started ensure BID, nutrition eval  po thiamine, mvi. B12 996, folate 18.7, tsh 6.79, T4 6.83, A1c 5.7%.    On 12/20/2022 this case was reviewed with Dr. Braun, the patient is medically stable and optimized for discharge. All medications were reviewed and prescriptions were sent to mutually agreed upon pharmacy.        87 year old male, with past history significant for HTN, CVA, Meningioma and A-fib, presented to the ED secondary to generalized weakness, and frequent falls. Code Stroke was called for left sided weakness. CT head, CTA H&N, showed patient's known meningioma with no other acute intracranial abnormality and no LVO. Patient currently pending placement to long term care facility.    Hospital Course    Suspected cerebrovascular accident (CVA).   Presented with reports of L-sided weakness and is s/p Stroke Code in the ED. Patient reports dizziness, including at rest, frequent falls - associated with head trauma as well. Passed dysphagia screen, regular diet. Found to have acute infarct on MRI. MRI c-spine (12/15) multilevel djd of c-spine and MRI brain favor the presence of hemorrhagic infarct in the right frontal parasagittal region with some scattered areas of diffusion hyperintensity also in the right high NAIMA territory more peripherally.  Left frontal dural based soft tissue lesion without significant mass effect or edema consistent with a meningioma is appreciated. Neurosurgery/neuro recs appreciated, MRI brain +/- contrast ordered but can be done as outpt, no need to hold him for MRI. ASA dc'd per neuro, c/w statin. Echo w/ bubble study (12/12) normal LV/RV function, LVEF 67%, 1+MR, no PFO. PT recs MARIAELENA. Patient's family prefers rehab/long term care facility. As per insurance pt does not have NH coverage and dispo changed to home with home care.     Sinus bradycardia.   Heart rate in 50s, but mostly when sleeping. On metoprolol 25 mg at home. Initial TSH mildly elevated at 6.79, repeat TSH and FT4 normal. Continue to hold BB. Echo with normal LV function as above.    Hypophosphatemia.   Likely contributing toward fatigue/weakness. Supplementation prescribed. Encourage po intake.    Paroxysmal atrial fibrillation.   [id number] score = 5. Not on AC PTA (due to repeated falls, including w/ head trauma), Holding home BB.    Thoracic compression fracture.   CT demonstrates "Age indeterminate likely subacute to chronic T12 anterior compression deformity without significant osseous retropulsion or definite surrounding soft tissue edema...." No reports of pain presently; patient has very high pain threshold, per daughter, so may not acknowledge the presence of pain.    Severe protein-calorie malnutrition.   Gastric cancer s/p resection. Family reports poor PO intake since gastric surgery several years ago.Has been losing weight since then, BMI 18. Started ensure BID, nutrition eval  po thiamine, mvi. B12 996, folate 18.7, tsh 6.79, T4 6.83, A1c 5.7%.    On 12/20/2022 this case was reviewed with Dr. Braun, the patient is medically stable and optimized for discharge. All medications were reviewed and prescriptions were sent to mutually agreed upon pharmacy.

## 2022-12-20 NOTE — CHART NOTE - NSCHARTNOTEFT_GEN_A_CORE
Source: Patient [x]     other [x] nurse, medical chart   Diet rx: Regular: Supplement Feeding Modality:  Oral  Ensure Enlive Cans or Servings Per Day:  1       Frequency:  Two Times a day (12-18-22 @ 10:38) [Active]    Pt 88 yo male with PMHx of HTN, CVA, Meningioma, Gastric cancer s/p resection, and A-fib (not on AC 2/2 falls w/ head trauma) - per chart review.     Pt awake, alert. At time of visit, Pt eating lunch with good appetite/PO intake; without any chewing or swallowing difficulty. No report of vomiting or diarrhea @ this time. +BM (12/19) per flow sheet. Of note, Pt passed Swallow Bedside Assessment Adult, SLP rec: Regular solids with thin liquids (12/12). No other food related concerns voiced @ present. Case discussed with nurse, MD. RDN remains available, nurse made aware.    Pt's height: 170 cm (12/10 - 67")       IBW: 148#+/-10%      Pt's weight: 52.3 kg (12/10)   Pertinent Medications: Lovenox, Multivitamin, Thiamin, Lipitor, Creon, Protonix,   Pertinent Labs: (12/20) H/H 12.3/37.3 L;       (12/10) HbA1c 5.7% H;       (12/9) Albumin 3.5   Skin: per flow sheet, +excoriation/ecchymosis     Estimated Needs: [x] no change since previous assessment  Previous Nutrition Diagnosis: [x] Malnutrition, Severe      Nutrition Interventions/Recommendations:   1. Continue PO diet as ordered; Monitor PO diet tolerance;   2. Honor food preferences;  3. Monitor labs, weekly weights, hydration status; Source: Patient [x]     other [x] nurse, medical chart   Diet rx: Regular: Supplement Feeding Modality:  Oral  Ensure Enlive Cans or Servings Per Day:  1       Frequency:  Two Times a day (12-18-22 @ 10:38) [Active]    Pt 86 yo male with PMHx of HTN, CVA, Meningioma, Gastric cancer s/p resection, and A-fib (not on AC 2/2 falls w/ head trauma) - per chart review.     Pt awake, alert. At time of visit, Pt eating lunch with good appetite/PO intake; without any chewing or swallowing difficulty. No report of vomiting or diarrhea @ this time. +BM (12/19) per flow sheet. Of note, Pt passed Swallow Bedside Assessment Adult, SLP rec: Regular solids with thin liquids (12/12). No other food related concerns voiced @ present. Case discussed with nurse, MD. RDN remains available, nurse made aware.    Pt's height: 170 cm (12/10 - 67")       IBW: 148#+/-10%      Pt's weight: 52.3 kg (12/10)   Pertinent Medications: Lovenox, Multivitamin, Thiamin, Lipitor, Creon, Protonix,   Pertinent Labs: (12/20) H/H 12.3/37.3 L;       (12/10) HbA1c 5.7% H;       (12/9) Albumin 3.5   Skin: per flow sheet, +excoriation/ecchymosis     Estimated Needs: [x] no change since previous assessment  Previous Nutrition Diagnosis: [x] Malnutrition, Severe      Nutrition Interventions/Recommendations:   1. Continue PO diet as ordered; Monitor PO diet tolerance;   2. Honor food preferences;  3. Monitor labs, weekly weights, hydration status;

## 2022-12-20 NOTE — DISCHARGE NOTE PROVIDER - CARE PROVIDER_API CALL
Austin Valencia  HEMATOLOGY  [address] [place] [address], [address] [place] [address]  Phone: [phone]  Fax: [phone]  Follow Up Time:    Austin Valencia  HEMATOLOGY  [address] [place] [address], [address] [place] [address]  Phone: [phone]  Fax: [phone]  Follow Up Time:    Austin Valencia  HEMATOLOGY  [address] [place] [address], [address] [place] [address]  Phone: [phone]  Fax: [phone]  Follow Up Time:

## 2022-12-20 NOTE — DISCHARGE NOTE PROVIDER - DETAILS OF MALNUTRITION DIAGNOSIS/DIAGNOSES
This patient has been assessed with a concern for Malnutrition and was treated during this hospitalization for the following Nutrition diagnosis/diagnoses:     -  12/12/2022: Severe protein-calorie malnutrition   -  12/12/2022: Underweight (BMI < 19)

## 2022-12-20 NOTE — DISCHARGE NOTE PROVIDER - NSDCCPCAREPLAN_GEN_ALL_CORE_FT
PRINCIPAL DISCHARGE DIAGNOSIS  Diagnosis: Suspected cerebrovascular accident (CVA)  Assessment and Plan of Treatment: Follow up with your neurologist in 1-2 weeks for further medical management. You were seen and evaluated by neurosurgery and it is recommended that you have an MRI with and without contrast done as an outpatient. Patient can follow up with general neurology at [address] [place] [address] 1-2 weeks after discharge. Please call [phone] to schedule this appointment. Continue to take your medications as prescribed, low salt, low fat, and diabetic diet.      SECONDARY DISCHARGE DIAGNOSES  Diagnosis: Sinus bradycardia  Assessment and Plan of Treatment: Your heart rate was low while in the hospital and your metoprolol medication has been stopped. Please follow up with your cardiologist or primary care provider regarding resuming this medication.    Diagnosis: Paroxysmal atrial fibrillation  Assessment and Plan of Treatment: Please continue your medications as directed and follow-up with your primary provider/cardiologist to further manage your care. Monitor for signs/symptoms of uncontrolled atrial fibrillation, such as, increased heart rate, palpitations, chest pain, dizziness, or shortness of breath - Return to emergency room if these signs/symptoms are present.     PRINCIPAL DISCHARGE DIAGNOSIS  Diagnosis: Suspected cerebrovascular accident (CVA)  Assessment and Plan of Treatment: Follow up with your neurologist in 1-2 weeks for further medical management. You were seen and evaluated by neurosurgery and it is recommended that you have an MRI with and without contrast done as an outpatient. Patient can follow up with general neurology at [address] [place] [address] 1-2 weeks after discharge. Please call [phone] to schedule this appointment. Continue to take your medications as prescribed, low salt, low fat, and diabetic diet.      SECONDARY DISCHARGE DIAGNOSES  Diagnosis: Sinus bradycardia  Assessment and Plan of Treatment: Your heart rate was low while in the hospital and your metoprolol medication has been stopped. Please follow up with your cardiologist or primary care provider regarding resuming this medication.    Diagnosis: Paroxysmal atrial fibrillation  Assessment and Plan of Treatment: Please continue your medications as directed and follow-up with your primary provider/cardiologist to further manage your care. Monitor for signs/symptoms of uncontrolled atrial fibrillation, such as, increased heart rate, palpitations, chest pain, dizziness, or shortness of breath - Return to emergency room if these signs/symptoms are present.     PRINCIPAL DISCHARGE DIAGNOSIS  Diagnosis: Suspected cerebrovascular accident (CVA)  Assessment and Plan of Treatment: Follow up with your neurologist in 1-2 weeks for further medical management. You were seen and evaluated by neurosurgery and it is recommended that you have an MRI with and without contrast done as an outpatient. Patient can follow up with general neurology at [address] [place] [address] 1-2 weeks after discharge. Please call [phone] to schedule this appointment. Continue to take your medications as prescribed, low salt, low fat, and diabetic diet.      SECONDARY DISCHARGE DIAGNOSES  Diagnosis: Sinus bradycardia  Assessment and Plan of Treatment: Your heart rate was low while in the hospital and your metoprolol medication has been stopped. Please follow up with your cardiologist or primary care provider regarding resuming this medication.    Diagnosis: Paroxysmal atrial fibrillation  Assessment and Plan of Treatment: Please continue your medications as directed and follow-up with your primary provider/cardiologist to further manage your care. Monitor for signs/symptoms of uncontrolled atrial fibrillation, such as, increased heart rate, palpitations, chest pain, dizziness, or shortness of breath - Return to emergency room if these signs/symptoms are present.

## 2023-06-08 NOTE — H&P ADULT - PROBLEM/PLAN-6
Follow up in about 4 weeks (around 7/6/2023), or if symptoms worsen or fail to improve, for Med refills.     Dear patient,   As a result of recent federal legislation (The Federal Cures Act), you may receive lab or pathology results from your visit in your [facility] account before your physician is able to contact you. Your physician or their representative will relay the results to you with their recommendations at their soonest availability.     If no improvement in symptoms or symptoms worsen, please be advised to call MD, follow-up at clinic and/or go to ER if becomes severe.    Abdiel Robles M.D.        We Offer TELEHEALTH & Same Day Appointments!   Book your Telehealth appointment with me through my nurse or   Clinic appointments on [facility]!    [address] [place] [address]    Office: [phone]   FAX: [phone]    Check out my Facebook Page and Follow Me at: [email or web address]    Check out my website at [employer] by clicking on: [email or web address]    To Schedule appointments online, go to [facility]: [email or web address]    
DISPLAY PLAN FREE TEXT

## 2023-12-08 RX ORDER — ICOSAPENT ETHYL 500 MG/1
2 CAPSULE, LIQUID FILLED ORAL
Qty: 0 | Refills: 0 | DISCHARGE

## 2023-12-08 RX ORDER — CALCIUM CARBONATE 500(1250)
1 TABLET ORAL
Qty: 0 | Refills: 0 | DISCHARGE

## 2023-12-08 RX ORDER — GABAPENTIN 400 MG/1
1 CAPSULE ORAL
Qty: 0 | Refills: 0 | DISCHARGE

## 2023-12-08 RX ORDER — METOPROLOL TARTRATE 50 MG
1 TABLET ORAL
Qty: 0 | Refills: 0 | DISCHARGE

## 2023-12-08 RX ORDER — LIPASE/PROTEASE/AMYLASE 16-48-48K
1 CAPSULE,DELAYED RELEASE (ENTERIC COATED) ORAL
Qty: 0 | Refills: 0 | DISCHARGE

## 2023-12-08 RX ORDER — TAMSULOSIN HYDROCHLORIDE 0.4 MG/1
1 CAPSULE ORAL
Qty: 0 | Refills: 0 | DISCHARGE

## 2023-12-08 RX ORDER — OMEPRAZOLE 10 MG/1
1 CAPSULE, DELAYED RELEASE ORAL
Qty: 0 | Refills: 0 | DISCHARGE

## 2023-12-08 RX ORDER — DENOSUMAB 60 MG/ML
0 INJECTION SUBCUTANEOUS
Qty: 0 | Refills: 0 | DISCHARGE

## 2023-12-08 RX ORDER — LIDOCAINE 4 G/100G
1 CREAM TOPICAL
Qty: 0 | Refills: 0 | DISCHARGE

## 2023-12-08 RX ORDER — SERTRALINE 25 MG/1
1 TABLET, FILM COATED ORAL
Qty: 0 | Refills: 0 | DISCHARGE

## 2024-05-30 NOTE — ED ADULT TRIAGE NOTE - PAIN RATING/NUMBER SCALE (0-10): REST
Spoke with pt. Reminded of open stool study order. Pt stated she had forgotten.    ALESHIA Christie placed order for C.Diff stool test 4/2024.    meri   0

## 2025-02-16 ENCOUNTER — EMERGENCY (EMERGENCY)
Facility: HOSPITAL | Age: 89
LOS: 1 days | Discharge: HOME | End: 2025-02-16
Payer: MEDICARE

## 2025-02-16 VITALS
RESPIRATION RATE: 15 BRPM | OXYGEN SATURATION: 98 % | HEART RATE: 57 BPM | DIASTOLIC BLOOD PRESSURE: 81 MMHG | TEMPERATURE: 99 F | WEIGHT: 100.09 LBS | SYSTOLIC BLOOD PRESSURE: 123 MMHG

## 2025-02-16 VITALS
RESPIRATION RATE: 15 BRPM | SYSTOLIC BLOOD PRESSURE: 120 MMHG | DIASTOLIC BLOOD PRESSURE: 72 MMHG | OXYGEN SATURATION: 96 % | HEART RATE: 60 BPM | TEMPERATURE: 98 F

## 2025-02-16 DIAGNOSIS — Z51.11 ENCOUNTER FOR ANTINEOPLASTIC CHEMOTHERAPY: Chronic | ICD-10-CM

## 2025-02-16 DIAGNOSIS — Z98.890 OTHER SPECIFIED POSTPROCEDURAL STATES: Chronic | ICD-10-CM

## 2025-02-16 PROBLEM — D32.9 BENIGN NEOPLASM OF MENINGES, UNSPECIFIED: Chronic | Status: ACTIVE | Noted: 2022-12-10

## 2025-02-16 PROBLEM — C18.9 MALIGNANT NEOPLASM OF COLON, UNSPECIFIED: Chronic | Status: ACTIVE | Noted: 2022-12-10

## 2025-02-16 PROBLEM — B02.9 ZOSTER WITHOUT COMPLICATIONS: Chronic | Status: ACTIVE | Noted: 2022-12-10

## 2025-02-16 PROBLEM — M81.0 AGE-RELATED OSTEOPOROSIS WITHOUT CURRENT PATHOLOGICAL FRACTURE: Chronic | Status: ACTIVE | Noted: 2022-12-10

## 2025-02-16 PROBLEM — I63.9 CEREBRAL INFARCTION, UNSPECIFIED: Chronic | Status: ACTIVE | Noted: 2022-12-10

## 2025-02-16 PROBLEM — I10 ESSENTIAL (PRIMARY) HYPERTENSION: Chronic | Status: ACTIVE | Noted: 2022-12-09

## 2025-02-16 PROBLEM — M54.9 DORSALGIA, UNSPECIFIED: Chronic | Status: ACTIVE | Noted: 2022-12-10

## 2025-02-16 PROBLEM — I48.91 UNSPECIFIED ATRIAL FIBRILLATION: Chronic | Status: ACTIVE | Noted: 2022-12-10

## 2025-02-16 PROBLEM — C16.9 MALIGNANT NEOPLASM OF STOMACH, UNSPECIFIED: Chronic | Status: ACTIVE | Noted: 2022-12-10

## 2025-02-16 PROCEDURE — 70450 CT HEAD/BRAIN W/O DYE: CPT | Mod: 26

## 2025-02-16 PROCEDURE — 99284 EMERGENCY DEPT VISIT MOD MDM: CPT | Mod: 25

## 2025-02-16 PROCEDURE — 70486 CT MAXILLOFACIAL W/O DYE: CPT | Mod: MC

## 2025-02-16 PROCEDURE — 99285 EMERGENCY DEPT VISIT HI MDM: CPT

## 2025-02-16 PROCEDURE — 76377 3D RENDER W/INTRP POSTPROCES: CPT

## 2025-02-16 PROCEDURE — 70450 CT HEAD/BRAIN W/O DYE: CPT | Mod: MC

## 2025-02-16 PROCEDURE — 76377 3D RENDER W/INTRP POSTPROCES: CPT | Mod: 26

## 2025-02-16 PROCEDURE — 12011 RPR F/E/E/N/L/M 2.5 CM/<: CPT

## 2025-02-16 PROCEDURE — 70486 CT MAXILLOFACIAL W/O DYE: CPT | Mod: 26

## 2025-02-16 RX ORDER — IBUPROFEN 600 MG/1
1 TABLET, FILM COATED ORAL
Qty: 20 | Refills: 0
Start: 2025-02-16

## 2025-02-16 NOTE — ED PROVIDER NOTE - OBJECTIVE STATEMENT
88yo Frisian speaking male with PMHx of HLD, CVA (no residual), A/FIB (no AC or ASA), Gastric Cancer s/p Partial Gastrectomy (12years ago) presents to ED with dental injury and lip laceration s/p mechanical fall today. Reports he lost his balance when he stood up from chair in his [employer] and fell on his face. Denies LOC. Denies other injuries. Denies headache, dizziness, visual changes or N/V. Denies neck or back pain. Denies sensory changes or weakness to extremities. Denies CP/SOB/ABD pain or hip pain. Denies fever, chills, or recent sickness. TD utded. 89yo German speaking male with PMHx of HLD, CVA (no residual), A/FIB (no AC or ASA), Gastric Cancer s/p Partial Gastrectomy (12years ago) presents to ED with dental injury and lip laceration s/p mechanical fall today. Reports he lost his balance when he stood up from chair in his [employer] and fell on his face. Denies LOC. Denies other injuries. Denies headache, dizziness, visual changes or N/V. Denies neck or back pain. Denies sensory changes or weakness to extremities. Denies CP/SOB/ABD pain or hip pain. Denies fever, chills, or recent sickness. TD utded. Declined pain med at this time. 89yo Greenlandic speaking male with PMHx of HLD, CVA (no residual), A/FIB (no AC or ASA), Meningoma, Gastric Cancer s/p Partial Gastrectomy (12years ago) presents to ED with dental injury and lip laceration s/p mechanical fall today. Reports he lost his balance when he stood up from chair in his [employer] and fell on his face. Denies LOC. Denies other injuries. Denies headache, dizziness, visual changes or N/V. Denies neck or back pain. Denies sensory changes or weakness to extremities. Denies CP/SOB/ABD pain or hip pain. Denies fever, chills, or recent sickness. TD utded. Declined pain med at this time.

## 2025-02-16 NOTE — ED PROVIDER NOTE - CLINICAL SUMMARY MEDICAL DECISION MAKING FREE TEXT BOX
Medical Decision Making / Differential Diagnosis:  HPI most consistent with mucosal upper lip laceration and associated near complete avulsion of right upper canine tooth.  Given age and facial trauma patient will need CT head and max face.  Plan: Imaging as above, wound care to the upper lip, dental consult.

## 2025-02-16 NOTE — PROGRESS NOTE ADULT - SUBJECTIVE AND OBJECTIVE BOX
Patient is a 89y old  Male who presents with a chief complaint of dental trauma from a fall.    HPI: [place]. was in [employer] and lost his balance and fell. Dental consulted to evaluate for potential dental trauma.       PAST MEDICAL & SURGICAL HISTORY:  HTN (hypertension)      Atrial fibrillation      Cerebrovascular accident (CVA)      Meningioma      Gastric cancer      Cancer, colon      Osteoporosis      Shingles      Back pain      Stomach cancer      H/O abdominal surgery      Other chemotherapy        MEDICATIONS  (STANDING):    MEDICATIONS  (PRN):      Allergies    No Known Allergies    Intolerances        FAMILY HISTORY:  Family history non-contributory      *Last Dental Visit: Recently.      EOE:  TMJ unremarkable.             Mandible FROM.             Facial bones and MOM - grossly intact.             ( - ) trismus             ( - ) lymphadenopathy             ( + ) swelling - Lower lip swelling.             ( - ) asymmetry             ( - ) palpation             ( - ) dyspnea             ( - ) dysphagia             ( - ) loss of consciousness    IOE:  Permanent dentition. Multiple missing teeth. Tooth #6 crown fractured off due to fall. Remaining dentition intact.            tongue/FOM - 5 mm diameter pedunculated vascular lesion on the right dorsal side of the tongue. Lesion has been present for approximately 2 years according to the family member. Site is doughy and (-) to palpation. Family member reports no change in size or color.            labial/buccal mucosa- Moderate laceration on the left buccal mucosa. Superficial laceration on the labial mucosa with hematoma and swelling present.           ( + ) percussion - Minor sensitivity on tooth #12 with grade II mobility.           ( - ) palpation           ( - ) swelling            ( - ) abscess           ( - ) sinus tract           ( + ) mobility - Tooth #12 (grade II), #22 (grade I), #23 (grade II), #24 (grade I), #25 (grade I).       *DENTAL RADIOGRAPHS: Panoramic + select PAs of teeth #6, 12/13, 24/25 and upper lip.  Interpretation: #6 crown fracture with root intact within socket, no foreign objects detected radiographically in upper lip. No signs of alveolar fracture. No signs of displacement of teeth.     RADIOLOGY & ADDITIONAL STUDIES: CT of brain and maxilla  Pertinent results: "No acute fracture or traumatic subluxation. Visualized soft tissues unremarkable."    *ASSESSMENT: No alveolar fracture or tooth displacement noted. #6 root tip should be evaluated for potential extraction or root canal treatment. No foreign debris in lip noted.       PROCEDURE:   Verbal consent received. Limited clinical exam completed. No signs of fracture detected clinically and based on imaging. Small lacerations of the upper and lower lips noted. Tooth #6 crown fractured at the gingival margin. Tooth should be evaluated by outside dentist regarding treatment. Teeth #12/22/23/24/25 were mobile, however it is unclear whether tooth mobility is related to trauma or chronic periodontal disease. 1.7 mL of 4% articaine and 1:100,000 epinephrine administered as a local infiltration to the upper and lower lip. Adequate anesthesia achieved. Sites were irrigated with saline and 2 simple interrupted chromic sutures were placed on the buccal laceration and 1 simple interrupted chromic suture was placed on the labial laceration. Discussed option of splint with family member. Family member preferred if split was placed. Informed family member that teeth may not be mobile to recent trauma. Family member understood. Teeth isolated,  and flexible wire splint placed with flowable composite from teeth 22-27.     RECOMMENDATIONS:  1) Chlorhexidine gluconate rinse - use 2x a day and discontinue after 1 week. Salt water rinse as needed. Pain meds as per med team.  2) Soft food diet. Continue oral hygiene routine including brushing 2x a day.   2) Dental F/U with outpatient dentist for splint removal (2-4 weeks) and continuation of comprehensive dental care.  3) If any difficulty swallowing/breathing, fever occur, return to ER.     Sheridan Shrestha, pager #[phone] Patient is a 89y old  Male who presents with a chief complaint of dental trauma from a fall.    HPI: Pt. was in [employer] and lost his balance and fell. Dental consulted to evaluate for potential dental trauma.       PAST MEDICAL & SURGICAL HISTORY:  HTN (hypertension)      Atrial fibrillation      Cerebrovascular accident (CVA)      Meningioma      Gastric cancer      Cancer, colon      Osteoporosis      Shingles      Back pain      Stomach cancer      H/O abdominal surgery      Other chemotherapy        MEDICATIONS  (STANDING):  Thiamine 100 mg oral tablet: 1 tab(s) orally once a day  · 	Multiple Vitamins oral tablet: 1 tab(s) orally once a day  · 	atorvastatin 80 mg oral tablet: 1 tab(s) orally once a day (at bedtime)   · 	LORazepam 1 mg oral tablet: 1 tab(s) orally once a day (at bedtime), As Needed  · 	omeprazole 20 mg oral delayed release capsule: 1 cap(s) orally once a day  · 	lidocaine 4% patch: 1 patch transdermal once a day - apply to abdomen  · 	sertraline 25 mg oral tablet: 1 tab(s) orally once a day  · 	Creon 24,000 units oral delayed release capsule: 1 cap(s) orally 3 times a day  · 	gabapentin 300 mg oral tablet: 1 tab(s) orally once a day  · 	Lubricant Eye Drops:   · 	tamsulosin 0.4 mg oral capsule: 1 cap(s) orally once a day        Allergies    No Known Allergies    Intolerances        FAMILY HISTORY:  Family history non-contributory      *Last Dental Visit: Recently.      EOE:  TMJ unremarkable.             Mandible FROM.             Facial bones and MOM - grossly intact.             ( - ) trismus             ( - ) lymphadenopathy             ( + ) swelling - Lower lip swelling.             ( - ) asymmetry             ( - ) palpation             ( - ) dyspnea             ( - ) dysphagia             ( - ) loss of consciousness    IOE:  Permanent dentition. Multiple missing teeth. Tooth #6 crown fractured off due to fall. Remaining dentition intact.            tongue/FOM - 5 mm diameter pedunculated vascular lesion on the right dorsal side of the tongue. Lesion has been present for approximately 2 years according to the family member. Site is doughy and (-) to palpation. Family member reports no change in size or color.            labial/buccal mucosa- Moderate laceration on the left buccal mucosa. Superficial laceration on the labial mucosa with hematoma and swelling present.           ( + ) percussion - Minor sensitivity on tooth #12 with grade II mobility.           ( - ) palpation           ( - ) swelling            ( - ) abscess           ( - ) sinus tract           ( + ) mobility - Tooth #12 (grade II), #22 (grade I), #23 (grade II), #24 (grade I), #25 (grade I).       *DENTAL RADIOGRAPHS: Panoramic + select PAs of teeth #6, 12/13, 24/25 and upper lip.  Interpretation: #6 crown fracture with root intact within socket, no foreign objects detected radiographically in upper lip. No signs of alveolar fracture. No signs of displacement of teeth. Possible PARL on tooth #21.    RADIOLOGY & ADDITIONAL STUDIES: CT of brain and maxilla  Pertinent results: "No acute fracture or traumatic subluxation. Visualized soft tissues unremarkable."    *ASSESSMENT: No alveolar fracture or tooth displacement noted. #6 root tip should be evaluated for potential extraction or root canal treatment. No foreign debris in lip noted.       PROCEDURE:   Verbal consent received. Limited clinical exam completed. No signs of fracture detected clinically and based on imaging. Small lacerations of the upper and lower lips noted. Tooth #6 crown fractured at the gingival margin. Tooth should be evaluated by outside dentist regarding treatment. Teeth #12/22/23/24/25 were mobile, however it is unclear whether tooth mobility is related to trauma or chronic periodontal disease. 1.7 mL of 4% articaine and 1:100,000 epinephrine administered as a local infiltration to the upper and lower lip. Adequate anesthesia achieved. Sites were irrigated with saline and 2 simple interrupted chromic sutures were placed on the buccal laceration and 1 simple interrupted chromic suture was placed on the labial laceration. Discussed option of splint with family member. Family member preferred if split was placed. Informed family member that teeth may not be mobile to recent trauma. Family member understood. Teeth isolated,  and flexible wire splint placed with flowable composite from teeth 22-27.     RECOMMENDATIONS:  1) Chlorhexidine gluconate rinse - use 2x a day and discontinue after 1 week. Salt water rinse as needed. Pain meds as per med team.  2) Soft food diet while splint is present. Continue oral hygiene routine including brushing 2x a day.   2) Dental F/U with outpatient dentist for splint removal (2-4 weeks) and continuation of comprehensive dental care.  3) If any difficulty swallowing/breathing, fever occur, return to ER.     Sheridan Shrestha, pager #[phone]

## 2025-02-16 NOTE — ED PROVIDER NOTE - NSFOLLOWUPINSTRUCTIONS_ED_ALL_ED_FT
[id number]  *More detailed information regarding your visit and discharge can be found by reviewing this packet.*  -----------------------------    Your diagnosis this visit was: dental avulsion.     You were seen by dental and had a splint placed. You also had stitches placed to the cuts on your mouth. Dental recommends that you rinse your mouth with chlorhexidene which is available over the counter. They also recommend that you follow up with your dentist as soon as possible.     Please return to the Emergency Department if you experience any of the following symptoms:  - Shortness of breath or trouble breathing  - Pressure, pain, or tightness in the chest  - Face drooping, arm weakness or speech difficulty,  - Persistent or severe vomiting  - Head injury or loss of consciousness  - Nonstop bleeding   - fevers, facial redness or swelling

## 2025-02-16 NOTE — ED PROVIDER NOTE - ATTENDING APP SHARED VISIT CONTRIBUTION OF CARE
Emergency Medicine Attending MD Esteves:   patient seen and evaluated with the NP.  I was present for key portions of the History and Physical, and I agree with the Impression and Plan.      Patient is a 89-year-old male complaining of right facial pain, upper lip laceration status post mechanical fall while at [employer].  Patient has had mechanical falls before in the past and has had to go to rehab.  Patient and family have no desire to be admitted to the hospital.  They are only here for evaluation of the lip laceration and loose tooth.  Patient was getting up from a sitting position and fell forward, striking his face on the ground.      No LOC, no neck pain.  No blurry vision, no double vision elderly male    VS: wnl  Gen: Well appearing XXX in NAD  Head: Small right upper lip laceration, no vermilion border involvement.  Mouth: Tooth #6 is nearly avulsed.  Neck: trachea midline  Resp:  No distress  CV: RRR, no RMG  Abd: nondistended  Ext: no deformities  Neuro:  A&Ox4 appears non focal  Skin:  Warm and dry as visualized  Psych: appropriate    Medical Decision Making / Differential Diagnosis:  HPI most consistent with mucosal upper lip laceration and associated near complete avulsion of right upper canine tooth.  Given age and facial trauma patient will need CT head and max face.  Plan: Imaging as above, wound care to the upper lip, dental consult. Emergency Medicine Attending MD Esteves:   patient seen and evaluated with the NP.  I was present for key portions of the History and Physical, and I agree with the Impression and Plan.      Patient is a 89-year-old male complaining of right facial pain, upper lip laceration status post mechanical fall while at [employer].  Patient has had mechanical falls before in the past and has had to go to rehab.  Patient and family have no desire to be admitted to the hospital.  They are only here for evaluation of the lip laceration and loose tooth.  Patient was getting up from a sitting position and fell forward, striking his face on the ground.      No LOC, no neck pain.  No blurry vision, no double vision elderly male    VS: wnl  Gen: Well appearing adult M in NAD  Head: Small right upper lip laceration, no vermilion border involvement.  Mouth: Tooth #6 is nearly avulsed.  Neck: trachea midline  Resp:  No distress  CV: RRR, no RMG  Abd: nondistended  Ext: no deformities  Neuro:  A&Ox4 appears non focal  Skin:  Warm and dry as visualized  Psych: appropriate    Medical Decision Making / Differential Diagnosis:  HPI most consistent with mucosal upper lip laceration and associated near complete avulsion of right upper canine tooth.  Given age and facial trauma patient will need CT head and max face.  Plan: Imaging as above, wound care to the upper lip, dental consult.

## 2025-02-16 NOTE — ED PROVIDER NOTE - PROGRESS NOTE DETAILS
NAD. Neuro- intact. Pending CT and dental consult. Pt went to CT. The injured tooth came out and pt was brought to dental room by dental resident. Pt was informed of CT results with well understanding. Neuro- intact. NAD. Now pending dental consult. Dr. Goode:  Pt received in s/o pending dental eval. Seen by dental who placed a splint to the lowers and repaired lip lacerations. Recommended chlorhexidene rinse and dental FU OP.

## 2025-02-16 NOTE — ED ADULT NURSE NOTE - NSFALLRISKASMTTYPE_ED_ALL_ED
[employer] Pregnancy And Lactation Text: This medication is Pregnancy Category X and is absolutely contraindicated during pregnancy. It is unknown if it is excreted in breast milk. Initial (On Arrival)

## 2025-02-16 NOTE — ED ADULT NURSE NOTE - OBJECTIVE STATEMENT
88 y/o male came to the ED s/p fall landing on his face , hitting his mouth. One tooth loose on the top right of his mouth. Inside of his lip cut, bleeding controlled. No other injuries noted. No LOC, no AC use. Denies headache, dizziness, N, V, CP, SOB.

## 2025-02-16 NOTE — ED ADULT NURSE NOTE - NSFALLHARMRISKINTERV_ED_ALL_ED

## 2025-02-16 NOTE — ED ADULT NURSE NOTE - CHIEF COMPLAINT
Diet, Consistent Carbohydrate w/Evening Snack (06-13-24 @ 13:10)   The patient is a 89y Male complaining of fall.

## 2025-02-16 NOTE — ED PROVIDER NOTE - PHYSICAL EXAMINATION
NAD. VSS. Afebrile. +PERRL, EOMI. +Dental #6 fx with shaking. Right upper inner lip 1cm laceration without vermilion border involvement, ecchymosis and abrasion on lower inner lip. Neck supple. Lungs clear, No spinal tender. No chest wall, rib, or cva tender. ABD soft, non tender. No hip tender. No focal neuro deficit.

## 2025-02-16 NOTE — ED PROVIDER NOTE - PATIENT PORTAL LINK FT
You can access the FollowMyHealth Patient Portal offered by [facility] by registering at the following website: [email or web address]. By joining [facility]’s FollowMyHealth portal, you will also be able to view your health information using other applications (apps) compatible with our system.